# Patient Record
Sex: FEMALE | Race: BLACK OR AFRICAN AMERICAN | NOT HISPANIC OR LATINO | Employment: OTHER | URBAN - METROPOLITAN AREA
[De-identification: names, ages, dates, MRNs, and addresses within clinical notes are randomized per-mention and may not be internally consistent; named-entity substitution may affect disease eponyms.]

---

## 2017-04-10 ENCOUNTER — OFFICE VISIT (OUTPATIENT)
Dept: FAMILY MEDICINE | Facility: CLINIC | Age: 77
End: 2017-04-10
Payer: MEDICARE

## 2017-04-10 VITALS
OXYGEN SATURATION: 99 % | SYSTOLIC BLOOD PRESSURE: 124 MMHG | HEIGHT: 63 IN | DIASTOLIC BLOOD PRESSURE: 78 MMHG | BODY MASS INDEX: 25.6 KG/M2 | WEIGHT: 144.5 LBS | HEART RATE: 62 BPM | TEMPERATURE: 99 F

## 2017-04-10 DIAGNOSIS — E78.5 HYPERLIPIDEMIA LDL GOAL <100: ICD-10-CM

## 2017-04-10 DIAGNOSIS — M85.80 OSTEOPENIA, UNSPECIFIED LOCATION: ICD-10-CM

## 2017-04-10 DIAGNOSIS — I12.9 BENIGN HYPERTENSION WITH CHRONIC KIDNEY DISEASE, STAGE III: ICD-10-CM

## 2017-04-10 DIAGNOSIS — N25.81 SECONDARY HYPERPARATHYROIDISM OF RENAL ORIGIN: ICD-10-CM

## 2017-04-10 DIAGNOSIS — H61.23 IMPACTED CERUMEN, BILATERAL: ICD-10-CM

## 2017-04-10 DIAGNOSIS — E66.3 OVERWEIGHT (BMI 25.0-29.9): ICD-10-CM

## 2017-04-10 DIAGNOSIS — E11.9 CONTROLLED TYPE 2 DIABETES MELLITUS WITHOUT COMPLICATION, WITHOUT LONG-TERM CURRENT USE OF INSULIN: ICD-10-CM

## 2017-04-10 DIAGNOSIS — D56.3 ALPHA THALASSEMIA TRAIT: ICD-10-CM

## 2017-04-10 DIAGNOSIS — E55.9 VITAMIN D DEFICIENCY DISEASE: ICD-10-CM

## 2017-04-10 DIAGNOSIS — Z00.00 ROUTINE MEDICAL EXAM: Primary | ICD-10-CM

## 2017-04-10 DIAGNOSIS — I70.0 ATHEROSCLEROSIS OF ABDOMINAL AORTA: ICD-10-CM

## 2017-04-10 DIAGNOSIS — Z12.31 ENCOUNTER FOR SCREENING MAMMOGRAM FOR BREAST CANCER: ICD-10-CM

## 2017-04-10 DIAGNOSIS — N18.30 BENIGN HYPERTENSION WITH CHRONIC KIDNEY DISEASE, STAGE III: ICD-10-CM

## 2017-04-10 PROCEDURE — 99213 OFFICE O/P EST LOW 20 MIN: CPT | Mod: PBBFAC,PO | Performed by: INTERNAL MEDICINE

## 2017-04-10 PROCEDURE — 99397 PER PM REEVAL EST PAT 65+ YR: CPT | Mod: S$PBB,,, | Performed by: INTERNAL MEDICINE

## 2017-04-10 PROCEDURE — 99999 PR PBB SHADOW E&M-EST. PATIENT-LVL III: CPT | Mod: PBBFAC,,, | Performed by: INTERNAL MEDICINE

## 2017-04-10 RX ORDER — METFORMIN HYDROCHLORIDE 500 MG/1
500 TABLET ORAL 2 TIMES DAILY WITH MEALS
Qty: 180 TABLET | Refills: 1 | Status: SHIPPED | OUTPATIENT
Start: 2017-04-10 | End: 2017-10-06 | Stop reason: SDUPTHER

## 2017-04-10 RX ORDER — MONTELUKAST SODIUM 4 MG/1
2 TABLET, CHEWABLE ORAL 2 TIMES DAILY
Qty: 360 TABLET | Refills: 1 | Status: SHIPPED | OUTPATIENT
Start: 2017-04-10 | End: 2017-10-06 | Stop reason: SDUPTHER

## 2017-04-10 RX ORDER — LISINOPRIL 40 MG/1
40 TABLET ORAL DAILY
Qty: 90 TABLET | Refills: 1 | Status: SHIPPED | OUTPATIENT
Start: 2017-04-10 | End: 2017-10-06 | Stop reason: SDUPTHER

## 2017-04-10 RX ORDER — HYDROCHLOROTHIAZIDE 25 MG/1
25 TABLET ORAL DAILY
Qty: 90 TABLET | Refills: 1 | Status: SHIPPED | OUTPATIENT
Start: 2017-04-10 | End: 2017-10-06 | Stop reason: SDUPTHER

## 2017-04-10 NOTE — MR AVS SNAPSHOT
[facility]  [address] [place] [address]  Phone: [phone]  Fax: [phone]                  Lizbeth CORDOVA Foster   4/10/2017 1:40 PM   Office Visit    Description:  Female : 1940   Provider:  Nusrat Cruz MD   Department:  [facility]           Reason for Visit     Hyperlipidemia     Diabetes     Follow-up           Diagnoses this Visit        Comments    Routine medical exam    -  Primary     Controlled type 2 diabetes mellitus without complication, without long-term current use of insulin         Benign hypertension with chronic kidney disease, stage III         Hyperlipidemia LDL goal <100         Osteopenia, unspecified location         Vitamin D deficiency disease         Alpha thalassemia trait         Atherosclerosis of abdominal aorta         Secondary hyperparathyroidism of renal origin         Overweight (BMI 25.0-29.9)         Encounter for screening mammogram for breast cancer                To Do List           Future Appointments        Provider Department Dept Phone    2017 8:30 AM LAB, [place] [facility] [phone]    2017 3:00 PM [facility] MAMMO1 [facility] [phone]      Goals (5 Years of Data)              10/10/16    3/3/16    9/30/15    HEMOGLOBIN A1C < 7.0   6.1  6.0  6.1    Related Problems    Controlled type 2 diabetes mellitus without complication      Follow-Up and Disposition     Return in about 6 months (around 10/10/2017), or if symptoms worsen or fail to improve, for follow up chronic medical conditions..       These Medications        Disp Refills Start End    colestipol (COLESTID) 1 gram Tab 360 tablet 1 4/10/2017     Take 2 tablets (2 g total) by mouth 2 (two) times daily. - Oral    Pharmacy: [facility] - [place]  [address] Ph #: [phone]       hydrochlorothiazide (HYDRODIURIL) 25 MG tablet 90 tablet 1 4/10/2017     Take 1  tablet (25 mg total) by mouth once daily. - Oral    Pharmacy: [facility] # [facility] [address] [place] [id number] Ph #: [phone]       lisinopril (PRINIVIL,ZESTRIL) 40 MG tablet 90 tablet 1 4/10/2017     Take 1 tablet (40 mg total) by mouth once daily. - Oral    Pharmacy: [facility] # [facility] [address] [place] [id number] [address] Ph #: [phone]       metformin (GLUCOPHAGE) 500 MG tablet 180 tablet 1 4/10/2017     Take 1 tablet (500 mg total) by mouth 2 (two) times daily with meals. - Oral    Pharmacy: [facility] # [facility] [address] [place] [address] Ph #: [phone]         [facility]     [facility] Nurse Care Line - 24/7 Assistance  Unless otherwise directed by your provider, please contact [facility], our nurse care line that is available for 24/7 assistance.     Registered nurses in the [facility] provide: appointment scheduling, clinical advisement, health education, and other advisory services.  Call: [phone] (toll free)               Medications           Message regarding Medications     Verify the changes and/or additions to your medication regime listed below are the same as discussed with your clinician today.  If any of these changes or additions are incorrect, please notify your healthcare provider.             Verify that the below list of medications is an accurate representation of the medications you are currently taking.  If none reported, the list may be blank. If incorrect, please contact your healthcare provider. Carry this list with you in case of emergency.           Current Medications     aspirin (ECOTRIN) 81 MG EC tablet Take 1 tablet (81 mg total) by mouth once daily.    colestipol (COLESTID) 1 gram Tab Take 2 tablets (2 g total) by mouth 2 (two) times daily.    cyanocobalamin (VITAMIN B-12) 1000 MCG tablet Take 100 mcg by mouth once daily.      fluticasone (VERAMYST) 27.5 mcg/actuation nasal spray 2 sprays  "by Nasal route once daily.      garlic 1 mg Cap Take by mouth. 1 Capsule Oral Every day    hydrochlorothiazide (HYDRODIURIL) 25 MG tablet Take 1 tablet (25 mg total) by mouth once daily.    lisinopril (PRINIVIL,ZESTRIL) 40 MG tablet Take 1 tablet (40 mg total) by mouth once daily.    metformin (GLUCOPHAGE) 500 MG tablet Take 1 tablet (500 mg total) by mouth 2 (two) times daily with meals.    omega-3 fatty acids 1,000 mg Cap Take by mouth. 1 Capsule Oral Every day    vitamin D 185 MG Tab Take 185 mg by mouth once daily.             Clinical Reference Information           Your Vitals Were     BP Pulse Temp Height Weight SpO2    124/78 62 98.5 °F (36.9 °C) (Oral) 5' 3" (1.6 m) 65.5 kg (144 lb 8.2 oz) 99%    BMI                25.6 kg/m2          Blood Pressure          Most Recent Value    BP  124/78      Allergies as of 4/10/2017     Cholesterol Analogues    Clindamycin    Statins-hmg-coa Reductase Inhibitors    Welchol [Colesevelam]    Codeine    Penicillins      Immunizations Administered on Date of Encounter - 4/10/2017     None      Orders Placed During Today's Visit     Future Labs/Procedures Expected by Expires    CBC auto differential  4/10/2017 4/10/2018    Comprehensive metabolic panel  4/10/2017 4/10/2018    Hemoglobin A1c  4/10/2017 4/10/2018    Lipid panel  4/10/2017 4/10/2018    Mammo Digital Screening Bilat with CAD  4/10/2017 6/10/2018      Language Assistance Services     ATTENTION: Language assistance services are available, free of charge. Please call [phone].      ATENCIÓN: Si habla español, tiene a tovar disposición servicios gratuitos de asistencia lingüística. Llame al [phone].     [facility] Ý: N?u b?n nói Ti?ng Vi?t, có các d?ch v? h? tr? ngôn ng? mi?n phí dành cho b?n. G?i s? [phone].         [facility] complies with applicable Federal civil rights laws and does not discriminate on the basis of race, color, national origin, age, disability, or sex.        "

## 2017-04-10 NOTE — PROGRESS NOTES
HISTORY OF PRESENT ILLNESS:  Lizbeth Hutchison is a 77 y.o. female who presents to the clinic today for a routine medical physical exam. Her last physical exam was approximately 1 years(s) ago.    Contraception: no method      PAST MEDICAL HISTORY:  Past Medical History:   Diagnosis Date    Alpha thalassemia trait     Anxiety     Atherosclerosis of abdominal aorta     noted on CT scan 2016    DDD (degenerative disc disease), lumbar     chronic low back pain    Diverticulosis     History of colonic polyps     last colonoscopy  - normal    Hyperlipidemia LDL goal <100     unable to tolerate statins or Welchol    Hypertension     Osteopenia     no need for medication at this time - last BMD 2010    Overweight     Type II or unspecified type diabetes mellitus without mention of complication, not stated as uncontrolled     Vitamin D deficiency disease     resolved with supplementation       PAST SURGICAL HISTORY:  Past Surgical History:   Procedure Laterality Date    1994    bilateral    HEMORRHOID SURGERY      KELOID EXCISION  ,     abdominal wall    TOTAL ABDOMINAL HYSTERECTOMY      TRIGGER FINGER RELEASE      left hand       SOCIAL HISTORY:  Social History     Social History    Marital status:      Spouse name: N/A    Number of children: 2    Years of education: N/A     Occupational History          Social History Main Topics    Smoking status: Never Smoker    Smokeless tobacco: Never Used    Alcohol use No    Drug use: Not on file    Sexual activity: Not on file     Other Topics Concern    Not on file     Social History Narrative       FAMILY HISTORY:  Family History   Problem Relation Age of Onset    Adopted: Yes    Heart failure Mother     Alzheimer's disease Mother     Breast cancer Maternal Grandmother     Other Sister       from too much anesthesia    Congenital heart disease Brother     Arthritis  Sister      back problems    Hypertension Sister     Transient ischemic attack Sister     Stroke Brother     Coronary artery disease Brother      s/p stenting    Stroke Brother     Colon cancer Other     Diabetes Neg Hx        ALLERGIES AND MEDICATIONS: updated and reviewed.  Review of patient's allergies indicates:   Allergen Reactions    Cholesterol analogues Other (See Comments)     Muscle spasam    Clindamycin Hives    Statins-hmg-coa reductase inhibitors Other (See Comments)    Welchol [colesevelam] Other (See Comments)    Codeine Rash    Penicillins Rash     Medication List with Changes/Refills   Current Medications    ASPIRIN (ECOTRIN) 81 MG EC TABLET    Take 1 tablet (81 mg total) by mouth once daily.    CYANOCOBALAMIN (VITAMIN B-12) 1000 MCG TABLET    Take 100 mcg by mouth once daily.      FLUTICASONE (VERAMYST) 27.5 MCG/ACTUATION NASAL SPRAY    2 sprays by Nasal route once daily.      GARLIC 1 MG CAP    Take by mouth. 1 Capsule Oral Every day    OMEGA-3 FATTY ACIDS 1,000 MG CAP    Take by mouth. 1 Capsule Oral Every day    VITAMIN D 185 MG TAB    Take 185 mg by mouth once daily.     Changed and/or Refilled Medications    Modified Medication Previous Medication    COLESTIPOL (COLESTID) 1 GRAM TAB colestipol (COLESTID) 1 gram Tab       Take 2 tablets (2 g total) by mouth 2 (two) times daily.    Take 2 tablets (2 g total) by mouth 2 (two) times daily.    HYDROCHLOROTHIAZIDE (HYDRODIURIL) 25 MG TABLET hydrochlorothiazide (HYDRODIURIL) 25 MG tablet       Take 1 tablet (25 mg total) by mouth once daily.    Take 1 tablet (25 mg total) by mouth once daily.    LISINOPRIL (PRINIVIL,ZESTRIL) 40 MG TABLET lisinopril (PRINIVIL,ZESTRIL) 40 MG tablet       Take 1 tablet (40 mg total) by mouth once daily.    Take 1 tablet (40 mg total) by mouth once daily.    METFORMIN (GLUCOPHAGE) 500 MG TABLET metformin (GLUCOPHAGE) 500 MG tablet       Take 1 tablet (500 mg total) by mouth 2 (two) times daily with meals.     "Take 1 tablet (500 mg total) by mouth 2 (two) times daily with meals.             SCREENING HISTORY:  Health Maintenance       Date Due Completion Date    TETANUS VACCINE 4/3/1958 ---    Lipid Panel 3/3/2017 3/3/2016    Mammogram 3/24/2017 3/24/2016    Hemoglobin A1c 4/10/2017 10/10/2016    Foot Exam 10/10/2017 10/10/2016    Eye Exam 10/31/2017 10/31/2016    Override on 3/18/2015: Done    Override on 3/4/2014: Done    Override on 1/28/2013: Done    DEXA SCAN 3/24/2018 3/24/2016    Override on 10/11/2011: Done (osteopenia)            REVIEW OF SYSTEMS:   The patient reports fair dietary habits.  The patient does not exercise regularly.  General ROS: negative for - chills, fever or malaise; she has gained a little weight  Psychological ROS: negative for - anxiety, depression, sleep disturbances or suicidal ideation  Ophthalmic ROS: negative for - blurry vision or eye pain  ENT ROS: negative for - epistaxis, headaches, nasal congestion, oral lesions or sore throat  Allergy and Immunology ROS: negative for - hives  Hematological and Lymphatic ROS: negative for - swollen lymph nodes  Endocrine ROS: negative for - polydipsia/polyuria or temperature intolerance  Respiratory ROS: no cough, shortness of breath, or wheezing  Cardiovascular ROS: no chest pain or dyspnea on exertion  Gastrointestinal ROS: no abdominal pain, change in bowel habits, or black or bloody stools  Genito-Urinary ROS: no dysuria, trouble voiding, or hematuria  Breast ROS: negative for breast lumps  Musculoskeletal ROS: negative for - gait disturbance, joint swelling, muscle pain or muscular weakness  Neurological ROS: no TIA or stroke symptoms  Dermatological ROS: negative for mole changes and rash    Physical Examination:   Vitals:    04/10/17 1351   BP: 124/78   Pulse: 62   Temp: 98.5 °F (36.9 °C)     Weight: 65.5 kg (144 lb 8.2 oz)   Height: 5' 3" (160 cm)   Body mass index is 25.6 kg/(m^2).    General appearance - alert, well appearing, and in no " distress and overweight  Mental status - alert, oriented to person, place, and time, normal mood, behavior, speech, dress, motor activity, and thought processes  Eyes - pupils equal and reactive, extraocular eye movements intact, sclera anicteric  Ears - bilateral cerumen impaction noted  Mouth - mucous membranes moist, pharynx normal without lesions  Neck - supple, no significant adenopathy, carotids upstroke normal bilaterally, no bruits, thyroid exam: thyroid is normal in size without nodules or tenderness  Lymphatics - no palpable lymphadenopathy  Chest - clear to auscultation, no wheezes, rales or rhonchi, symmetric air entry  Heart - normal rate and regular rhythm, no gallops noted  Abdomen - soft, nontender, nondistended, no masses or organomegaly  Breasts - breasts appear normal, no suspicious masses, no skin or nipple changes or axillary nodes  Back exam - full range of motion, no tenderness, palpable spasm or pain on motion  Neurological - alert, oriented, normal speech, no focal findings or movement disorder noted, cranial nerves II through XII intact  Musculoskeletal - no joint tenderness, deformity or swelling  Extremities - peripheral pulses normal, no pedal edema, no clubbing or cyanosis  Skin - normal coloration and turgor, no rashes, no suspicious skin lesions noted      ASSESSMENT AND PLAN:  1. Routine medical exam  Counseled on age appropriate medical preventative services including age appropriate cancer screenings, age appropriate eye and dental exams, over all nutritional health, need for a consistent exercise regimen, and an over all push towards maintaining a vigorous and active lifestyle.  Counseled on age appropriate vaccines and discussed upcoming health care needs based on age/gender. Discussed good sleep hygiene and stress management.     2. Controlled type 2 diabetes mellitus without complication, without long-term current use of insulin  Diabetes currently is controlled. We discussed  diabetic diet and regular exercise. We discussed home blood sugar monitoring, if appropriate. The current medical regimen is effective;  continue present plan and medications.    - Hemoglobin A1c; Future  - metformin (GLUCOPHAGE) 500 MG tablet; Take 1 tablet (500 mg total) by mouth 2 (two) times daily with meals.  Dispense: 180 tablet; Refill: 1    3. Benign hypertension with chronic kidney disease, stage III  Discussed sodium restriction, maintaining ideal body weight and regular exercise program as physiologic means to achieve blood pressure control. The patient will strive towards this. The current medical regimen is effective;  continue present plan and medications. Recommended patient to check home readings to monitor and see me for followup as scheduled or sooner as needed. Patient was educated that both decongestant and anti-inflammatory medication may raise blood pressure. Stable kidney function. Observe. Patient counseled to avoid/minimize the use of anti-inflammatory  medication.   - hydrochlorothiazide (HYDRODIURIL) 25 MG tablet; Take 1 tablet (25 mg total) by mouth once daily.  Dispense: 90 tablet; Refill: 1  - lisinopril (PRINIVIL,ZESTRIL) 40 MG tablet; Take 1 tablet (40 mg total) by mouth once daily.  Dispense: 90 tablet; Refill: 1  - CBC auto differential; Future    4. Hyperlipidemia LDL goal <100  We discussed low fat diet and regular exercise.The current medical regimen is effective;  continue present plan and medications.    - Lipid panel; Future  - colestipol (COLESTID) 1 gram Tab; Take 2 tablets (2 g total) by mouth 2 (two) times daily.  Dispense: 360 tablet; Refill: 1  - Comprehensive metabolic panel; Future    5. Osteopenia, unspecified location/6. Vitamin D deficiency disease  We discussed adequate calcium and vitamin D supplementation. She is up to date on her BMD.     7. Alpha thalassemia trait  Stable. Asymptomatic. Observe.     8. Atherosclerosis of abdominal aorta  Patient with  Atherosclerosis of the Aorta.  Stable/asymptomatic. Currently stable on lipid lowering medication and b/p monitoring.     9. Secondary hyperparathyroidism of renal origin  Stable. Asymptomatic. Observe.     10. Overweight (BMI 25.0-29.9)  The patient is asked to make an attempt to improve diet and exercise patterns to aid in medical management of this problem.     11. Encounter for screening mammogram for breast cancer    - Mammo Digital Screening Bilat with CAD; Future  - Mammo Digital Screening Bilat with CAD    12. Impacted cerumen, bilateral  Successfully cleaned in the office.          Return in about 6 months (around 10/10/2017), or if symptoms worsen or fail to improve, for follow up chronic medical conditions.. or sooner as needed.

## 2017-04-11 ENCOUNTER — LAB VISIT (OUTPATIENT)
Dept: LAB | Facility: HOSPITAL | Age: 77
End: 2017-04-11
Attending: INTERNAL MEDICINE
Payer: MEDICARE

## 2017-04-11 DIAGNOSIS — I12.9 BENIGN HYPERTENSION WITH CHRONIC KIDNEY DISEASE, STAGE III: ICD-10-CM

## 2017-04-11 DIAGNOSIS — E78.5 HYPERLIPIDEMIA LDL GOAL <100: ICD-10-CM

## 2017-04-11 DIAGNOSIS — E11.9 CONTROLLED TYPE 2 DIABETES MELLITUS WITHOUT COMPLICATION, WITHOUT LONG-TERM CURRENT USE OF INSULIN: ICD-10-CM

## 2017-04-11 DIAGNOSIS — N18.30 BENIGN HYPERTENSION WITH CHRONIC KIDNEY DISEASE, STAGE III: ICD-10-CM

## 2017-04-11 LAB
ALBUMIN SERPL BCP-MCNC: 3.9 G/DL
ALP SERPL-CCNC: 79 U/L
ALT SERPL W/O P-5'-P-CCNC: 10 U/L
ANION GAP SERPL CALC-SCNC: 11 MMOL/L
AST SERPL-CCNC: 13 U/L
BASOPHILS # BLD AUTO: 0.03 K/UL
BASOPHILS NFR BLD: 0.5 %
BILIRUB SERPL-MCNC: 0.5 MG/DL
BUN SERPL-MCNC: 20 MG/DL
CALCIUM SERPL-MCNC: 9.5 MG/DL
CHLORIDE SERPL-SCNC: 105 MMOL/L
CHOLEST/HDLC SERPL: 5.2 {RATIO}
CO2 SERPL-SCNC: 25 MMOL/L
CREAT SERPL-MCNC: 1.3 MG/DL
DIFFERENTIAL METHOD: ABNORMAL
EOSINOPHIL # BLD AUTO: 0.1 K/UL
EOSINOPHIL NFR BLD: 2.3 %
ERYTHROCYTE [DISTWIDTH] IN BLOOD BY AUTOMATED COUNT: 15.4 %
EST. GFR  (AFRICAN AMERICAN): 45.7 ML/MIN/1.73 M^2
EST. GFR  (NON AFRICAN AMERICAN): 39.7 ML/MIN/1.73 M^2
GLUCOSE SERPL-MCNC: 89 MG/DL
HCT VFR BLD AUTO: 41.2 %
HDL/CHOLESTEROL RATIO: 19.4 %
HDLC SERPL-MCNC: 232 MG/DL
HDLC SERPL-MCNC: 45 MG/DL
HGB BLD-MCNC: 12.6 G/DL
LDLC SERPL CALC-MCNC: 153 MG/DL
LYMPHOCYTES # BLD AUTO: 1.9 K/UL
LYMPHOCYTES NFR BLD: 31.9 %
MCH RBC QN AUTO: 22.6 PG
MCHC RBC AUTO-ENTMCNC: 30.6 %
MCV RBC AUTO: 74 FL
MONOCYTES # BLD AUTO: 0.4 K/UL
MONOCYTES NFR BLD: 6.9 %
NEUTROPHILS # BLD AUTO: 3.5 K/UL
NEUTROPHILS NFR BLD: 58.4 %
NONHDLC SERPL-MCNC: 187 MG/DL
PLATELET # BLD AUTO: 227 K/UL
PMV BLD AUTO: 11.9 FL
POTASSIUM SERPL-SCNC: 4.1 MMOL/L
PROT SERPL-MCNC: 7.6 G/DL
RBC # BLD AUTO: 5.57 M/UL
SODIUM SERPL-SCNC: 141 MMOL/L
TRIGL SERPL-MCNC: 170 MG/DL
WBC # BLD AUTO: 6.05 K/UL

## 2017-04-11 PROCEDURE — 83036 HEMOGLOBIN GLYCOSYLATED A1C: CPT

## 2017-04-11 PROCEDURE — 85025 COMPLETE CBC W/AUTO DIFF WBC: CPT

## 2017-04-11 PROCEDURE — 36415 COLL VENOUS BLD VENIPUNCTURE: CPT | Mod: PO

## 2017-04-11 PROCEDURE — 80053 COMPREHEN METABOLIC PANEL: CPT

## 2017-04-11 PROCEDURE — 80061 LIPID PANEL: CPT

## 2017-04-12 LAB
ESTIMATED AVG GLUCOSE: 137 MG/DL
HBA1C MFR BLD HPLC: 6.4 %

## 2017-04-13 ENCOUNTER — HOSPITAL ENCOUNTER (OUTPATIENT)
Dept: RADIOLOGY | Facility: HOSPITAL | Age: 77
Discharge: HOME | End: 2017-04-13
Attending: INTERNAL MEDICINE
Payer: MEDICARE

## 2017-04-13 DIAGNOSIS — Z12.31 ENCOUNTER FOR SCREENING MAMMOGRAM FOR BREAST CANCER: ICD-10-CM

## 2017-04-13 PROCEDURE — 77063 BREAST TOMOSYNTHESIS BI: CPT | Mod: 26,,, | Performed by: RADIOLOGY

## 2017-04-13 PROCEDURE — 77067 SCR MAMMO BI INCL CAD: CPT | Mod: 26,,, | Performed by: RADIOLOGY

## 2017-04-13 PROCEDURE — 77067 SCR MAMMO BI INCL CAD: CPT | Mod: TC

## 2017-10-06 ENCOUNTER — OFFICE VISIT (OUTPATIENT)
Dept: FAMILY MEDICINE | Facility: CLINIC | Age: 77
End: 2017-10-06
Payer: MEDICARE

## 2017-10-06 VITALS
TEMPERATURE: 98 F | DIASTOLIC BLOOD PRESSURE: 56 MMHG | OXYGEN SATURATION: 97 % | SYSTOLIC BLOOD PRESSURE: 114 MMHG | HEART RATE: 56 BPM | BODY MASS INDEX: 24.61 KG/M2 | HEIGHT: 63 IN | WEIGHT: 138.88 LBS

## 2017-10-06 DIAGNOSIS — N18.30 BENIGN HYPERTENSION WITH CHRONIC KIDNEY DISEASE, STAGE III: ICD-10-CM

## 2017-10-06 DIAGNOSIS — I70.0 ATHEROSCLEROSIS OF ABDOMINAL AORTA: ICD-10-CM

## 2017-10-06 DIAGNOSIS — I12.9 BENIGN HYPERTENSION WITH CHRONIC KIDNEY DISEASE, STAGE III: ICD-10-CM

## 2017-10-06 DIAGNOSIS — N18.30 CHRONIC KIDNEY DISEASE, STAGE 3: ICD-10-CM

## 2017-10-06 DIAGNOSIS — E78.5 HYPERLIPIDEMIA LDL GOAL <100: ICD-10-CM

## 2017-10-06 DIAGNOSIS — E66.3 OVERWEIGHT (BMI 25.0-29.9): ICD-10-CM

## 2017-10-06 DIAGNOSIS — D56.3 ALPHA THALASSEMIA TRAIT: Primary | ICD-10-CM

## 2017-10-06 DIAGNOSIS — E55.9 VITAMIN D DEFICIENCY DISEASE: ICD-10-CM

## 2017-10-06 DIAGNOSIS — E11.9 CONTROLLED TYPE 2 DIABETES MELLITUS WITHOUT COMPLICATION, WITHOUT LONG-TERM CURRENT USE OF INSULIN: ICD-10-CM

## 2017-10-06 DIAGNOSIS — M85.80 OSTEOPENIA, UNSPECIFIED LOCATION: ICD-10-CM

## 2017-10-06 DIAGNOSIS — N25.81 SECONDARY HYPERPARATHYROIDISM OF RENAL ORIGIN: ICD-10-CM

## 2017-10-06 DIAGNOSIS — Z23 NEED FOR IMMUNIZATION AGAINST INFLUENZA: ICD-10-CM

## 2017-10-06 PROCEDURE — 99214 OFFICE O/P EST MOD 30 MIN: CPT | Mod: S$PBB,,, | Performed by: NURSE PRACTITIONER

## 2017-10-06 PROCEDURE — 99214 OFFICE O/P EST MOD 30 MIN: CPT | Mod: PBBFAC,PO | Performed by: NURSE PRACTITIONER

## 2017-10-06 PROCEDURE — 99999 PR PBB SHADOW E&M-EST. PATIENT-LVL IV: CPT | Mod: PBBFAC,,, | Performed by: NURSE PRACTITIONER

## 2017-10-06 PROCEDURE — G0008 ADMIN INFLUENZA VIRUS VAC: HCPCS | Mod: PBBFAC,PO

## 2017-10-06 RX ORDER — METFORMIN HYDROCHLORIDE 500 MG/1
500 TABLET ORAL 2 TIMES DAILY WITH MEALS
Qty: 180 TABLET | Refills: 1 | Status: SHIPPED | OUTPATIENT
Start: 2017-10-06 | End: 2017-10-11 | Stop reason: ALTCHOICE

## 2017-10-06 RX ORDER — HYDROCHLOROTHIAZIDE 25 MG/1
25 TABLET ORAL DAILY
Qty: 90 TABLET | Refills: 1 | Status: SHIPPED | OUTPATIENT
Start: 2017-10-06 | End: 2018-06-26 | Stop reason: SDUPTHER

## 2017-10-06 RX ORDER — LISINOPRIL 40 MG/1
40 TABLET ORAL DAILY
Qty: 90 TABLET | Refills: 1 | Status: SHIPPED | OUTPATIENT
Start: 2017-10-06 | End: 2018-06-26 | Stop reason: SDUPTHER

## 2017-10-06 RX ORDER — MONTELUKAST SODIUM 4 MG/1
2 TABLET, CHEWABLE ORAL 2 TIMES DAILY
Qty: 360 TABLET | Refills: 1 | Status: SHIPPED | OUTPATIENT
Start: 2017-10-06 | End: 2019-03-22 | Stop reason: SDUPTHER

## 2017-10-06 NOTE — PROGRESS NOTES
Subjective:       Patient ID: Lizbeth Hutchison is a 77 y.o. female.    Chief Complaint: Hypertension (F/U); Hyperlipidemia (F/U); and Diabetes (F/U)    77-year-old female presents to the clinic today for follow-up on hypertension, hyperlipidemia, and diabetes.  She does not check her blood sugars at home.  Her home blood pressures run in the 120s over 70-80.  She has good dietary habits.  She dances twice a week.  She is compliant with all of her medications.  She denies any cardiac chest pain, heart palpitations, shortness of breath, or swelling to lower extremities.  She denies any headaches, dizziness, or blurred vision.  She states that she had her eye exam in March or April of this year per .  I will attempt to get a copy of that results.  She would like a need to examine her breasts.  Her mammogram is up-to-date.       Hypertension   Pertinent negatives include no chest pain, headaches, neck pain or palpitations.   Hyperlipidemia   Pertinent negatives include no chest pain.   Diabetes   Pertinent negatives for hypoglycemia include no confusion or headaches. Pertinent negatives for diabetes include no chest pain, no polydipsia, no polyuria and no weakness.     Past Medical History:   Diagnosis Date    Alpha thalassemia trait     Anxiety     Atherosclerosis of abdominal aorta     noted on CT scan 4/6/2016    DDD (degenerative disc disease), lumbar     chronic low back pain    Diverticulosis     History of colonic polyps     last colonoscopy 2011 - normal    Hyperlipidemia LDL goal <100     unable to tolerate statins or Welchol    Hypertension     Osteopenia     no need for medication at this time - last BMD October 2010    Overweight     Type II or unspecified type diabetes mellitus without mention of complication, not stated as uncontrolled     Vitamin D deficiency disease     resolved with supplementation     Past Surgical History:   Procedure Laterality Date    samuel  1994     bilateral    HEMORRHOID SURGERY  1970    KELOID EXCISION  1974, 1976    abdominal wall    TOTAL ABDOMINAL HYSTERECTOMY  1972    TRIGGER FINGER RELEASE  2003    left hand      reports that she has never smoked. She has never used smokeless tobacco. She reports that she does not drink alcohol.  Review of Systems   Constitutional: Negative for activity change and unexpected weight change.   HENT: Negative for hearing loss, rhinorrhea and trouble swallowing.    Eyes: Negative for discharge and visual disturbance.   Respiratory: Negative for cough, chest tightness and wheezing.    Cardiovascular: Negative for chest pain and palpitations.   Gastrointestinal: Negative for blood in stool, constipation, diarrhea and vomiting.   Endocrine: Negative for polydipsia and polyuria.   Genitourinary: Negative for difficulty urinating, dysuria, hematuria and menstrual problem.   Musculoskeletal: Negative for arthralgias, joint swelling and neck pain.   Neurological: Negative for weakness and headaches.   Psychiatric/Behavioral: Negative for confusion and dysphoric mood.       Objective:      Physical Exam   Constitutional: She is oriented to person, place, and time. She appears well-developed and well-nourished. No distress.   Eyes: Conjunctivae and EOM are normal. Pupils are equal, round, and reactive to light. Right eye exhibits no discharge. Left eye exhibits no discharge. No scleral icterus.   Neck: Normal range of motion. Neck supple. No JVD present.   Cardiovascular: Normal rate, regular rhythm and normal heart sounds.  Exam reveals no gallop and no friction rub.    No murmur heard.  Pulmonary/Chest: Effort normal. No respiratory distress. She has no wheezes. She has no rales.   Abdominal: Soft. Bowel sounds are normal. There is no tenderness.   Genitourinary:   Genitourinary Comments: No palpable masses, tenderness or nipple discharge noted to either breast no axilla adenopathy    Musculoskeletal: Normal range of motion.  She exhibits no edema or deformity.   Protective Sensation (w/ 10 gram monofilament):  Right: Intact  Left: Intact    Visual Inspection:  Normal -  Bilateral    Pedal Pulses:   Right: Present  Left: Present    Posterior tibialis:   Right:Present  Left: Present     Neurological: She is alert and oriented to person, place, and time.   Skin: Skin is warm and dry. She is not diaphoretic.   Psychiatric: She has a normal mood and affect.       Assessment:       1. Alpha thalassemia trait    2. Controlled type 2 diabetes mellitus without complication, without long-term current use of insulin    3. Benign hypertension with chronic kidney disease, stage III    4. Hyperlipidemia LDL goal <100    5. Atherosclerosis of abdominal aorta    6. Chronic kidney disease, stage 3    7. Osteopenia, unspecified location    8. Overweight (BMI 25.0-29.9)    9. Secondary hyperparathyroidism of renal origin    10. Vitamin D deficiency disease    11. Need for immunization against influenza        Plan:         Alpha thalassemia trait  - stable observe asymptomatic     Controlled type 2 diabetes mellitus without complication, without long-term current use of insulin  -     metformin (GLUCOPHAGE) 500 MG tablet; Take 1 tablet (500 mg total) by mouth 2 (two) times daily with meals.  Dispense: 180 tablet; Refill: 1  -     Comprehensive metabolic panel; Future; Expected date: 10/06/2017  -     Hemoglobin A1c; Future; Expected date: 10/06/2017  - await lab results pending any medication adjustments    Benign hypertension with chronic kidney disease, stage III  -     lisinopril (PRINIVIL,ZESTRIL) 40 MG tablet; Take 1 tablet (40 mg total) by mouth once daily.  Dispense: 90 tablet; Refill: 1  -     hydrochlorothiazide (HYDRODIURIL) 25 MG tablet; Take 1 tablet (25 mg total) by mouth once daily.  Dispense: 90 tablet; Refill: 1  -     CBC auto differential; Future; Expected date: 10/06/2017  - The current medical regimen is effective;  continue present  plan and medications.    Hyperlipidemia LDL goal <100  -     colestipol (COLESTID) 1 gram Tab; Take 2 tablets (2 g total) by mouth 2 (two) times daily.  Dispense: 360 tablet; Refill: 1  -     Comprehensive metabolic panel; Future; Expected date: 10/06/2017  -     Lipid panel; Future; Expected date: 10/06/2017  - The current medical regimen is effective;  continue present plan and medications.    Atherosclerosis of abdominal aorta  - Stable / Asymptomatic is on blood pressure and cholesterol lowering medications    Chronic kidney disease, stage 3  - avoid all anti-inflammatories     Osteopenia, unspecified location  - start Calcium 600 mg po daily   - continue Vitamin D    Overweight (BMI 25.0-29.9)  - The patient is asked to make an attempt to improve diet and exercise patterns to aid in medical management of this problem.    Secondary hyperparathyroidism of renal origin  - stable observe asymptomatic     Vitamin D deficiency disease  - The current medical regimen is effective;  continue present plan and medications.    Need for immunization against influenza  -     Influenza - High Dose (65+) (PF) (IM)

## 2017-10-07 ENCOUNTER — LAB VISIT (OUTPATIENT)
Dept: LAB | Facility: HOSPITAL | Age: 77
End: 2017-10-07
Payer: MEDICARE

## 2017-10-07 DIAGNOSIS — I12.9 BENIGN HYPERTENSION WITH CHRONIC KIDNEY DISEASE, STAGE III: ICD-10-CM

## 2017-10-07 DIAGNOSIS — N18.30 BENIGN HYPERTENSION WITH CHRONIC KIDNEY DISEASE, STAGE III: ICD-10-CM

## 2017-10-07 DIAGNOSIS — E11.9 CONTROLLED TYPE 2 DIABETES MELLITUS WITHOUT COMPLICATION, WITHOUT LONG-TERM CURRENT USE OF INSULIN: ICD-10-CM

## 2017-10-07 DIAGNOSIS — E78.5 HYPERLIPIDEMIA LDL GOAL <100: ICD-10-CM

## 2017-10-07 LAB
ALBUMIN SERPL BCP-MCNC: 3.9 G/DL
ALP SERPL-CCNC: 87 U/L
ALT SERPL W/O P-5'-P-CCNC: 9 U/L
ANION GAP SERPL CALC-SCNC: 8 MMOL/L
AST SERPL-CCNC: 11 U/L
BASOPHILS # BLD AUTO: 0.03 K/UL
BASOPHILS NFR BLD: 0.5 %
BILIRUB SERPL-MCNC: 0.6 MG/DL
BUN SERPL-MCNC: 20 MG/DL
CALCIUM SERPL-MCNC: 9.7 MG/DL
CHLORIDE SERPL-SCNC: 104 MMOL/L
CHOLEST SERPL-MCNC: 237 MG/DL
CHOLEST/HDLC SERPL: 5.2 {RATIO}
CO2 SERPL-SCNC: 30 MMOL/L
CREAT SERPL-MCNC: 1.6 MG/DL
DIFFERENTIAL METHOD: ABNORMAL
EOSINOPHIL # BLD AUTO: 0.1 K/UL
EOSINOPHIL NFR BLD: 2.3 %
ERYTHROCYTE [DISTWIDTH] IN BLOOD BY AUTOMATED COUNT: 15.5 %
EST. GFR  (AFRICAN AMERICAN): 35.6 ML/MIN/1.73 M^2
EST. GFR  (NON AFRICAN AMERICAN): 30.9 ML/MIN/1.73 M^2
ESTIMATED AVG GLUCOSE: 123 MG/DL
GLUCOSE SERPL-MCNC: 95 MG/DL
HBA1C MFR BLD HPLC: 5.9 %
HCT VFR BLD AUTO: 42.7 %
HDLC SERPL-MCNC: 46 MG/DL
HDLC SERPL: 19.4 %
HGB BLD-MCNC: 12.7 G/DL
LDLC SERPL CALC-MCNC: 151.8 MG/DL
LYMPHOCYTES # BLD AUTO: 1.6 K/UL
LYMPHOCYTES NFR BLD: 26.5 %
MCH RBC QN AUTO: 22.4 PG
MCHC RBC AUTO-ENTMCNC: 29.7 G/DL
MCV RBC AUTO: 75 FL
MONOCYTES # BLD AUTO: 0.4 K/UL
MONOCYTES NFR BLD: 6.5 %
NEUTROPHILS # BLD AUTO: 3.8 K/UL
NEUTROPHILS NFR BLD: 64.2 %
NONHDLC SERPL-MCNC: 191 MG/DL
PLATELET # BLD AUTO: 239 K/UL
PMV BLD AUTO: ABNORMAL FL
POTASSIUM SERPL-SCNC: 4.2 MMOL/L
PROT SERPL-MCNC: 7.6 G/DL
RBC # BLD AUTO: 5.68 M/UL
SODIUM SERPL-SCNC: 142 MMOL/L
TRIGL SERPL-MCNC: 196 MG/DL
WBC # BLD AUTO: 5.99 K/UL

## 2017-10-07 PROCEDURE — 80053 COMPREHEN METABOLIC PANEL: CPT

## 2017-10-07 PROCEDURE — 36415 COLL VENOUS BLD VENIPUNCTURE: CPT | Mod: PO

## 2017-10-07 PROCEDURE — 85025 COMPLETE CBC W/AUTO DIFF WBC: CPT

## 2017-10-07 PROCEDURE — 83036 HEMOGLOBIN GLYCOSYLATED A1C: CPT

## 2017-10-07 PROCEDURE — 80061 LIPID PANEL: CPT

## 2017-10-10 ENCOUNTER — TELEPHONE (OUTPATIENT)
Dept: FAMILY MEDICINE | Facility: CLINIC | Age: 77
End: 2017-10-10

## 2017-10-10 NOTE — TELEPHONE ENCOUNTER
I spoke with the patient and explained that her kidney function has slightly worsened and she needed to stay well hydrated and avoid all anti-inflammatories. She does not take any of these. I explained that her triglycerides were slightly elevated so she needed to watch the fats closely in her diet. The rest of her labs were acceptable. I told that we needed to recheck her kidney function in 3 months. She will call back and schedule a appointment. Patient verbalized understanding of above.

## 2017-10-11 ENCOUNTER — TELEPHONE (OUTPATIENT)
Dept: FAMILY MEDICINE | Facility: CLINIC | Age: 77
End: 2017-10-11

## 2017-10-11 NOTE — TELEPHONE ENCOUNTER
I spoke with the patient and told her that I wanted her to stop her Metformin due to her CKD worsening and will recheck labs in 3 months. Patient will call and schedule a appointment. Patient verbalized understanding of above.

## 2017-10-14 ENCOUNTER — OFFICE VISIT (OUTPATIENT)
Dept: URGENT CARE | Facility: CLINIC | Age: 77
End: 2017-10-14
Payer: MEDICARE

## 2017-10-14 VITALS
TEMPERATURE: 98 F | HEART RATE: 59 BPM | SYSTOLIC BLOOD PRESSURE: 126 MMHG | WEIGHT: 138.88 LBS | DIASTOLIC BLOOD PRESSURE: 64 MMHG | OXYGEN SATURATION: 100 % | BODY MASS INDEX: 24.61 KG/M2 | HEIGHT: 63 IN

## 2017-10-14 DIAGNOSIS — M10.9 ACUTE GOUT OF RIGHT FOOT, UNSPECIFIED CAUSE: Primary | ICD-10-CM

## 2017-10-14 PROCEDURE — 99214 OFFICE O/P EST MOD 30 MIN: CPT | Mod: 25,S$GLB,, | Performed by: EMERGENCY MEDICINE

## 2017-10-14 PROCEDURE — 96372 THER/PROPH/DIAG INJ SC/IM: CPT | Mod: S$GLB,,, | Performed by: EMERGENCY MEDICINE

## 2017-10-14 RX ORDER — DEXAMETHASONE SODIUM PHOSPHATE 100 MG/10ML
4 INJECTION INTRAMUSCULAR; INTRAVENOUS
Status: COMPLETED | OUTPATIENT
Start: 2017-10-14 | End: 2017-10-14

## 2017-10-14 RX ORDER — TRAMADOL HYDROCHLORIDE 50 MG/1
50 TABLET ORAL
Qty: 3 TABLET | Refills: 0 | Status: SHIPPED | OUTPATIENT
Start: 2017-10-14 | End: 2017-10-17

## 2017-10-14 RX ADMIN — DEXAMETHASONE SODIUM PHOSPHATE 4 MG: 100 INJECTION INTRAMUSCULAR; INTRAVENOUS at 05:10

## 2017-10-14 NOTE — PATIENT INSTRUCTIONS
Gout    Gout is an inflammation of a joint due to a build-up of gout crystals in the joint fluid. This occurs when there is an excess of uric acid (a normal waste product) in the body. Uric acid builds up in the body when the kidneys are unable to filter enough of it from the blood. This may occur with age. It is also associated with kidney disease. Gout occurs more often in people with obesity, diabetes, high blood pressure, or high levels of fats in the blood. It may run in families. Gout tends to come and go. A flare up of gout is called an attack. Drinking alcohol or eating certain foods (such as shellfish or foods with additives such as high-fructose corn syrup) may increase uric acid levels in the blood and cause a gout attack.  During a gout attack, the affected joint may become a hot, red, swollen and painful. If you have had one attack of gout, you are likely to have another. An attack of gout can be treated with medicine. If these attacks become frequent, a daily medicine may be prescribed to help the kidneys remove uric acid from the body.  Home care  During a gout attack:  · Rest painful joints. If gout affects the joints of your foot or leg, you may want to use crutches for the first few days to keep from bearing weight on the affected joint.  · When sitting or lying down, raise the painful joint to a level higher than your heart.  · Apply an ice pack (ice cubes in a plastic bag wrapped in a thin towel) over the injured area for 20 minutes every 1 to 2 hours the first day for pain relief. Continue this 3 to 4 times a day for swelling and pain.  · Avoid alcohol and foods listed below (see Preventing attacks) during a gout attack. Drink extra fluid to help flush the uric acid through your kidneys.  · If you were prescribed a medicine to treat gout, take it as your healthcare provider has instructed. Don't skip doses.  · Take anti-inflammatory medicine as directed.   · If pain medicines have been  prescribed, take them exactly as directed.    Preventing attacks  · Minimize or avoid alcohol use. Excess alcohol intake can cause a gout attack.  · Limit these foods and beverages:  ¨ Organ meats, such as kidneys and liver  ¨ Certain seafoods (anchovies, sardines, shrimp, scallops, herring, mackerel)  ¨ Wild game, meat extracts and meat gravies  ¨ Foods and beverages sweetened with high-fructose corn syrup, such as sodas  · Eat a healthy diet including low-fat and nonfat dairy, whole grains, and vegetables.  · If you are overweight, talk to your healthcare provider about a weight reduction plan. Avoid fasting or extreme low calorie diets (less than 900 calories per day). This will increase uric acid levels in the body.  · If you have diabetes or high blood pressure, work with your doctor to manage these conditions.  · Protect the joint from injury. Trauma can trigger a gout attack.  Follow-up care  Follow up with your healthcare provider, or as advised.   When to seek medical advice  Call your healthcare provider if you have any of the following:  · Fever over 100.4°F (38.ºC) with worsening joint pain  · Increasing redness around the joint  · Pain developing in another joint  · Repeated vomiting, abdominal pain, or blood in the vomit or stool (black or red color)  Date Last Reviewed: 3/1/2017  © 6029-6900 [employer]. [address] [place] [id number]. All rights reserved. This information is not intended as a substitute for professional medical care. Always follow your healthcare professional's instructions.        Gout Diet  Gout is a painful condition caused by an excess of uric acid, a waste product made by the body. Uric acid forms crystals that collect in the joints. The immune response to these crystals brings on symptoms of joint pain and swelling. This is called a gout attack. Often, medications and diet changes are combined to manage gout. Below are some guidelines for changing your  diet to help you manage gout and prevent attacks. Your health care provider will help you determine the best eating plan for you.     Eating to manage gout  Weight loss for those who are overweight may help reduce gout attacks.  Eat less of these foods  Eating too many foods containing purines may raise the levels of uric acid in your body. This raises your risk for a gout attack. Try to limit these foods and drinks:  · Alcohol, such as beer and red wine. You may be told to avoid alcohol completely.  · Soft drinks that contain sugar or high fructose corn syrup  · Certain fish, including anchovies, sardines, fish eggs, and herring  · Shellfish  · Certain meats, such as red meat, hot dogs, luncheon meats, and turkey  · Organ meats, such as liver, kidneys, and sweetbreads  · Legumes, such as dried beans and peas  · Other high fat foods such as gravy, whole milk, and high fat cheeses  · Vegetables such as asparagus, cauliflower, spinach, and mushrooms used to be thought to contribute to an increased risk for a gout attack, but recent studies show that high purine vegetables don't increase the risk for a gout attack.  Eat more of these foods  Other foods may be helpful for people with gout. Add some of these foods to your diet:  · Cherries contain chemicals that may lower uric acid.  · Omega fatty acids. These are found in some fatty fish such as salmon, certain oils (flax, olive, or nut), and nuts themselves. Omega fatty acids may help prevent inflammation due to gout.  · Dairy products that are low-fat or fat-free, such as cheese and yogurt  · Complex carbohydrate foods, including whole grains, brown rice, oats, and beans  · Coffee, in moderation  · Water, approximately 64 ounces per day  Follow-up care  Follow up with your healthcare provider as advised.  When to seek medical advice  Call your healthcare provider right away if any of these occur:  · Return of gout symptoms, usually at night:  · Severe pain, swelling,  and heat in a joint, especially the base of the big toe  · Affected joint is hard to move  · Skin of the affected joint is purple or red  · Fever of 100.4°F (38°C) or higher  · Pain that doesn't get better even with prescribed medicine   Date Last Reviewed: 1/12/2016  © 5245-4447 [employer]. [address] [place] [address]. All rights reserved. This information is not intended as a substitute for professional medical care. Always follow your healthcare professional's instructions.      Gary Ayoub MD  Go to the Emergency Department for any problems

## 2017-10-14 NOTE — PROGRESS NOTES
"Subjective:       Patient ID: Lizbeth Hutchison is a 77 y.o. female.    Vitals:  height is 5' 3" (1.6 m) and weight is 63 kg (138 lb 14.2 oz). Her oral temperature is 98 °F (36.7 °C). Her blood pressure is 126/64 and her pulse is 59 (abnormal). Her oxygen saturation is 100%.     Chief Complaint: Gout    Pt can not take any antiinflammatories due to her kidneys. C/o waking up yesterday with pain/redness/swelling to right big toe, no recall of trauma, hx of gout to same area about 6 years ago, NOC.      Toe Pain    There was no injury mechanism. The pain is present in the right toes. The quality of the pain is described as burning. The pain is at a severity of 6/10. The pain is moderate. The pain has been constant since onset.     Review of Systems   Constitution: Negative for chills and fever.   HENT: Negative for sore throat.    Eyes: Negative for blurred vision.   Cardiovascular: Negative for chest pain.   Respiratory: Negative for shortness of breath.    Skin: Negative for rash.   Musculoskeletal: Positive for gout and joint pain. Negative for back pain.   Gastrointestinal: Negative for abdominal pain, diarrhea, nausea and vomiting.   Neurological: Negative for headaches.   Psychiatric/Behavioral: The patient is not nervous/anxious.        Objective:      Physical Exam   Constitutional: She appears well-developed.   HENT:   Head: Normocephalic and atraumatic.   Cardiovascular: Normal rate, regular rhythm, normal heart sounds and intact distal pulses.    Pulmonary/Chest: Breath sounds normal.   Musculoskeletal:        Feet:    Right 1st MTP joint area erythema/edema/warmth/tenderness, no drainage/lesions seen, decrease ROM, remainder right foot non tender   Skin: Skin is warm and dry.   Psychiatric: She has a normal mood and affect. Her behavior is normal.       Assessment:       1. Acute gout of right foot, unspecified cause        Plan:         Acute gout of right foot, unspecified cause  -     dexamethasone " injection 4 mg; Inject 0.4 mLs (4 mg total) into the muscle one time.  -     tramadol (ULTRAM) 50 mg tablet; Take 1 tablet (50 mg total) by mouth every 24 hours as needed for Pain.  Dispense: 3 tablet; Refill: 0      Gary Ayoub MD  Go to the Emergency Department for any problems

## 2018-03-27 ENCOUNTER — OFFICE VISIT (OUTPATIENT)
Dept: FAMILY MEDICINE | Facility: CLINIC | Age: 78
End: 2018-03-27
Payer: MEDICARE

## 2018-03-27 VITALS
WEIGHT: 137.81 LBS | DIASTOLIC BLOOD PRESSURE: 48 MMHG | HEIGHT: 63 IN | SYSTOLIC BLOOD PRESSURE: 96 MMHG | TEMPERATURE: 98 F | HEART RATE: 56 BPM | BODY MASS INDEX: 24.42 KG/M2 | OXYGEN SATURATION: 97 %

## 2018-03-27 DIAGNOSIS — M85.80 OSTEOPENIA, UNSPECIFIED LOCATION: ICD-10-CM

## 2018-03-27 DIAGNOSIS — N18.30 BENIGN HYPERTENSION WITH CHRONIC KIDNEY DISEASE, STAGE III: ICD-10-CM

## 2018-03-27 DIAGNOSIS — M89.9 DISORDER OF BONE AND CARTILAGE: ICD-10-CM

## 2018-03-27 DIAGNOSIS — I70.0 ATHEROSCLEROSIS OF ABDOMINAL AORTA: ICD-10-CM

## 2018-03-27 DIAGNOSIS — E55.9 VITAMIN D DEFICIENCY DISEASE: ICD-10-CM

## 2018-03-27 DIAGNOSIS — J06.9 VIRAL URI WITH COUGH: ICD-10-CM

## 2018-03-27 DIAGNOSIS — N25.81 SECONDARY HYPERPARATHYROIDISM OF RENAL ORIGIN: ICD-10-CM

## 2018-03-27 DIAGNOSIS — M94.9 DISORDER OF BONE AND CARTILAGE: ICD-10-CM

## 2018-03-27 DIAGNOSIS — D56.3 ALPHA THALASSEMIA TRAIT: Primary | ICD-10-CM

## 2018-03-27 DIAGNOSIS — I12.9 BENIGN HYPERTENSION WITH CHRONIC KIDNEY DISEASE, STAGE III: ICD-10-CM

## 2018-03-27 DIAGNOSIS — E78.5 HYPERLIPIDEMIA LDL GOAL <100: ICD-10-CM

## 2018-03-27 DIAGNOSIS — F32.1 CURRENT MODERATE EPISODE OF MAJOR DEPRESSIVE DISORDER WITHOUT PRIOR EPISODE: ICD-10-CM

## 2018-03-27 DIAGNOSIS — Z12.39 SCREENING FOR BREAST CANCER: ICD-10-CM

## 2018-03-27 DIAGNOSIS — E11.9 CONTROLLED TYPE 2 DIABETES MELLITUS WITHOUT COMPLICATION, WITHOUT LONG-TERM CURRENT USE OF INSULIN: ICD-10-CM

## 2018-03-27 DIAGNOSIS — H61.23 BILATERAL IMPACTED CERUMEN: ICD-10-CM

## 2018-03-27 DIAGNOSIS — E66.3 OVERWEIGHT (BMI 25.0-29.9): ICD-10-CM

## 2018-03-27 PROCEDURE — 99215 OFFICE O/P EST HI 40 MIN: CPT | Mod: PBBFAC,PO | Performed by: NURSE PRACTITIONER

## 2018-03-27 PROCEDURE — 99999 PR PBB SHADOW E&M-EST. PATIENT-LVL V: CPT | Mod: PBBFAC,,, | Performed by: NURSE PRACTITIONER

## 2018-03-27 PROCEDURE — 99214 OFFICE O/P EST MOD 30 MIN: CPT | Mod: S$PBB,,, | Performed by: NURSE PRACTITIONER

## 2018-03-27 RX ORDER — CITALOPRAM 10 MG/1
10 TABLET ORAL DAILY
Qty: 90 TABLET | Refills: 1 | Status: SHIPPED | OUTPATIENT
Start: 2018-03-27 | End: 2019-04-17

## 2018-03-27 NOTE — PROGRESS NOTES
Subjective:       Patient ID: Lizbeth Hutchison is a 77 y.o. female.    Chief Complaint: Annual Exam    77-year-old female presents to the clinic today for an annual exam.  She states she lost her son November 21, 2017 after he had bilateral lung transplants from sepsis.  She states presently she is taking care of her  at home with hospice due to him having dementia.  She does have a sitter 7 days a week during the day and 3 nights a week.  She's having a lot of trouble with depression.  She denies any anxiety, suicidal ideations, homicidal ideations, or hallucinations.  She says she has good dietary habits.  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  She is compliant with all of her medications.  She takes vitamin D 2000 IUs daily.  She is scheduled to see her eye doctor Dr. Barksdale in April 2018.  She denies any headaches, dizziness, or blurred vision. She denies any cardiac chest pain, heart palpitations, shortness breath, or swelling to lower extremities.  She is due for DEXA scan and also a mammogram next month.  She complains of her both of her ears being stopped up.  She also has a mild cough.      Past Medical History:   Diagnosis Date    Alpha thalassemia trait     Anxiety     Atherosclerosis of abdominal aorta     noted on CT scan 4/6/2016    DDD (degenerative disc disease), lumbar     chronic low back pain    Diverticulosis     History of colonic polyps     last colonoscopy 2011 - normal    Hyperlipidemia LDL goal <100     unable to tolerate statins or Welchol    Hypertension     Osteopenia     no need for medication at this time - last BMD October 2010    Overweight     Type II or unspecified type diabetes mellitus without mention of complication, not stated as uncontrolled     Vitamin D deficiency disease     resolved with supplementation     Past Surgical History:   Procedure Laterality Date    samuel  1994    bilateral    HEMORRHOID SURGERY  1970    KELOID  EXCISION  1974, 1976    abdominal wall    TOTAL ABDOMINAL HYSTERECTOMY  1972    TRIGGER FINGER RELEASE  2003    left hand      reports that she has never smoked. She has never used smokeless tobacco. She reports that she does not drink alcohol.  Review of Systems   Constitutional: Negative for chills and fever.   HENT: Negative for congestion, ear discharge, ear pain, postnasal drip, rhinorrhea, sinus pressure, sneezing and sore throat.         Ears stopped up    Eyes: Negative for pain, discharge and itching.   Respiratory: Positive for cough. Negative for shortness of breath and wheezing.    Cardiovascular: Negative for chest pain, palpitations and leg swelling.   Gastrointestinal: Negative for abdominal pain, diarrhea, nausea and vomiting.   Musculoskeletal: Negative for back pain, neck pain and neck stiffness.   Skin: Negative for rash.   Neurological: Negative for dizziness, light-headedness and headaches.   Hematological: Negative for adenopathy.   Psychiatric/Behavioral: Negative for agitation, behavioral problems, confusion, hallucinations, self-injury, sleep disturbance and suicidal ideas. The patient is not nervous/anxious.         Depression        Objective:      Physical Exam   Constitutional: She is oriented to person, place, and time. She appears well-developed and well-nourished. No distress.   HENT:   Head: Normocephalic and atraumatic.   Nose: Nose normal.   Mouth/Throat: Oropharynx is clear and moist.   Both ear canals large amount of wax    Eyes: Conjunctivae and EOM are normal. Pupils are equal, round, and reactive to light. Right eye exhibits no discharge. Left eye exhibits no discharge. No scleral icterus.   Neck: Normal range of motion. Neck supple. No JVD present. No thyromegaly present.   Cardiovascular: Normal rate and regular rhythm.  Exam reveals no friction rub.    No murmur heard.  Pulmonary/Chest: Effort normal and breath sounds normal. No respiratory distress. She has no wheezes.  She has no rales.   Abdominal: Soft. Bowel sounds are normal. There is no tenderness. There is no rebound and no guarding.   Musculoskeletal: Normal range of motion. She exhibits no edema.   Lymphadenopathy:     She has no cervical adenopathy.   Neurological: She is alert and oriented to person, place, and time. She displays normal reflexes.   Skin: Skin is warm and dry. No rash noted. She is not diaphoretic.   Psychiatric: She has a normal mood and affect. Her behavior is normal. Judgment and thought content normal.       Assessment:       1. Alpha thalassemia trait    2. Atherosclerosis of abdominal aorta    3. Benign hypertension with chronic kidney disease, stage III    4. Controlled type 2 diabetes mellitus without complication, without long-term current use of insulin    5. Hyperlipidemia LDL goal <100    6. Osteopenia, unspecified location    7. Overweight (BMI 25.0-29.9)    8. Secondary hyperparathyroidism of renal origin    9. Vitamin D deficiency disease    10. Screening for breast cancer    11. Disorder of bone and cartilage    12. Bilateral impacted cerumen    13. Current moderate episode of major depressive disorder without prior episode    14. Viral URI with cough        Plan:         Alpha thalassemia trait  - stable observe asymptomatic     Atherosclerosis of abdominal aorta  - Stable / Asymptomatic is on blood pressure and cholesterol lowering medications    Benign hypertension with chronic kidney disease, stage III  -     CBC auto differential; Future; Expected date: 03/27/2018  -     Comprehensive metabolic panel; Future; Expected date: 03/27/2018  - The current medical regimen is effective;  continue present plan and medications.  - avoid all anti-inflammatories  - stay well hydrated    Controlled type 2 diabetes mellitus without complication, without long-term current use of insulin  -     Comprehensive metabolic panel; Future; Expected date: 03/27/2018  -     Lipid panel; Future; Expected date:  03/27/2018  -     Hemoglobin A1c; Future; Expected date: 03/27/2018  - diet controlled    Hyperlipidemia LDL goal <100  - The current medical regimen is effective;  continue present plan and medications.    Osteopenia, unspecified location  - continue calcium and vitamin D    Overweight (BMI 25.0-29.9)  - The patient is asked to make an attempt to improve diet and exercise patterns to aid in medical management of this problem.    Secondary hyperparathyroidism of renal origin  - stable observe asymptomatic     Vitamin D deficiency disease  -     Vitamin D; Future; Expected date: 03/27/2018  - continue Vitamin D 2,000 IU daily    Screening for breast cancer  -     Mammo Digital Screening Bilat with CAD; Future; Expected date: 03/27/2018    Disorder of bone and cartilage  -     DXA Bone Density Spine And Hip; Future; Expected date: 03/27/2018    Bilateral impacted cerumen  - ear wash  - after ear wash able to visualize both TM without any difficulty   - states feels much better now     Current moderate episode of major depressive disorder without prior episode  -     citalopram (CELEXA) 10 MG tablet; Take 1 tablet (10 mg total) by mouth once daily.  Dispense: 90 tablet; Refill: 1  - Start Celexa 10 mg po daily    Viral URI with cough  - Mucinex DM as directed

## 2018-03-27 NOTE — PATIENT INSTRUCTIONS
Continue all current medications   Watch diet closely  Continue exercising   Dexa and mammogram scheduled  Fasting labs scheduled   Eye exam due with Dr. Barksdale 4/2018  Mucinex DM as directed for cough

## 2018-03-28 ENCOUNTER — LAB VISIT (OUTPATIENT)
Dept: LAB | Facility: HOSPITAL | Age: 78
End: 2018-03-28
Attending: NURSE PRACTITIONER
Payer: MEDICARE

## 2018-03-28 DIAGNOSIS — E11.9 CONTROLLED TYPE 2 DIABETES MELLITUS WITHOUT COMPLICATION, WITHOUT LONG-TERM CURRENT USE OF INSULIN: ICD-10-CM

## 2018-03-28 DIAGNOSIS — N18.30 BENIGN HYPERTENSION WITH CHRONIC KIDNEY DISEASE, STAGE III: ICD-10-CM

## 2018-03-28 DIAGNOSIS — E55.9 VITAMIN D DEFICIENCY DISEASE: ICD-10-CM

## 2018-03-28 DIAGNOSIS — I12.9 BENIGN HYPERTENSION WITH CHRONIC KIDNEY DISEASE, STAGE III: ICD-10-CM

## 2018-03-28 LAB
25(OH)D3+25(OH)D2 SERPL-MCNC: 44 NG/ML
ALBUMIN SERPL BCP-MCNC: 4.2 G/DL
ALP SERPL-CCNC: 77 U/L
ALT SERPL W/O P-5'-P-CCNC: 13 U/L
ANION GAP SERPL CALC-SCNC: 11 MMOL/L
AST SERPL-CCNC: 14 U/L
BASOPHILS # BLD AUTO: 0.04 K/UL
BASOPHILS NFR BLD: 0.7 %
BILIRUB SERPL-MCNC: 0.6 MG/DL
BUN SERPL-MCNC: 25 MG/DL
CALCIUM SERPL-MCNC: 9.5 MG/DL
CHLORIDE SERPL-SCNC: 106 MMOL/L
CHOLEST SERPL-MCNC: 239 MG/DL
CHOLEST/HDLC SERPL: 5.2 {RATIO}
CO2 SERPL-SCNC: 27 MMOL/L
CREAT SERPL-MCNC: 1.5 MG/DL
DIFFERENTIAL METHOD: ABNORMAL
EOSINOPHIL # BLD AUTO: 0.2 K/UL
EOSINOPHIL NFR BLD: 3.3 %
ERYTHROCYTE [DISTWIDTH] IN BLOOD BY AUTOMATED COUNT: 16.8 %
EST. GFR  (AFRICAN AMERICAN): 38.5 ML/MIN/1.73 M^2
EST. GFR  (NON AFRICAN AMERICAN): 33.4 ML/MIN/1.73 M^2
ESTIMATED AVG GLUCOSE: 120 MG/DL
GLUCOSE SERPL-MCNC: 94 MG/DL
HBA1C MFR BLD HPLC: 5.8 %
HCT VFR BLD AUTO: 44.2 %
HDLC SERPL-MCNC: 46 MG/DL
HDLC SERPL: 19.2 %
HGB BLD-MCNC: 13 G/DL
IMM GRANULOCYTES # BLD AUTO: 0.02 K/UL
IMM GRANULOCYTES NFR BLD AUTO: 0.3 %
LDLC SERPL CALC-MCNC: 157.4 MG/DL
LYMPHOCYTES # BLD AUTO: 1.5 K/UL
LYMPHOCYTES NFR BLD: 26.1 %
MCH RBC QN AUTO: 22.3 PG
MCHC RBC AUTO-ENTMCNC: 29.4 G/DL
MCV RBC AUTO: 76 FL
MONOCYTES # BLD AUTO: 0.5 K/UL
MONOCYTES NFR BLD: 8.7 %
NEUTROPHILS # BLD AUTO: 3.5 K/UL
NEUTROPHILS NFR BLD: 60.9 %
NONHDLC SERPL-MCNC: 193 MG/DL
NRBC BLD-RTO: 0 /100 WBC
PLATELET # BLD AUTO: 188 K/UL
PMV BLD AUTO: 13 FL
POTASSIUM SERPL-SCNC: 4.2 MMOL/L
PROT SERPL-MCNC: 7.7 G/DL
RBC # BLD AUTO: 5.83 M/UL
SODIUM SERPL-SCNC: 144 MMOL/L
TRIGL SERPL-MCNC: 178 MG/DL
WBC # BLD AUTO: 5.78 K/UL

## 2018-03-28 PROCEDURE — 80053 COMPREHEN METABOLIC PANEL: CPT

## 2018-03-28 PROCEDURE — 85025 COMPLETE CBC W/AUTO DIFF WBC: CPT

## 2018-03-28 PROCEDURE — 36415 COLL VENOUS BLD VENIPUNCTURE: CPT | Mod: PO

## 2018-03-28 PROCEDURE — 80061 LIPID PANEL: CPT

## 2018-03-28 PROCEDURE — 82306 VITAMIN D 25 HYDROXY: CPT

## 2018-03-28 PROCEDURE — 83036 HEMOGLOBIN GLYCOSYLATED A1C: CPT

## 2018-04-03 ENCOUNTER — TELEPHONE (OUTPATIENT)
Dept: FAMILY MEDICINE | Facility: CLINIC | Age: 78
End: 2018-04-03

## 2018-04-03 ENCOUNTER — PATIENT MESSAGE (OUTPATIENT)
Dept: FAMILY MEDICINE | Facility: CLINIC | Age: 78
End: 2018-04-03

## 2018-04-03 NOTE — TELEPHONE ENCOUNTER
I discussed her lab results with her that I had sent over [employer] and she verbalized understanding of above.

## 2018-04-19 ENCOUNTER — TELEPHONE (OUTPATIENT)
Dept: FAMILY MEDICINE | Facility: CLINIC | Age: 78
End: 2018-04-19

## 2018-04-19 ENCOUNTER — HOSPITAL ENCOUNTER (OUTPATIENT)
Dept: RADIOLOGY | Facility: CLINIC | Age: 78
Discharge: HOME | End: 2018-04-19
Attending: NURSE PRACTITIONER
Payer: MEDICARE

## 2018-04-19 ENCOUNTER — HOSPITAL ENCOUNTER (OUTPATIENT)
Dept: RADIOLOGY | Facility: HOSPITAL | Age: 78
Discharge: HOME | End: 2018-04-19
Attending: NURSE PRACTITIONER
Payer: MEDICARE

## 2018-04-19 DIAGNOSIS — Z12.39 SCREENING FOR BREAST CANCER: ICD-10-CM

## 2018-04-19 DIAGNOSIS — M94.9 DISORDER OF BONE AND CARTILAGE: ICD-10-CM

## 2018-04-19 DIAGNOSIS — M89.9 DISORDER OF BONE AND CARTILAGE: ICD-10-CM

## 2018-04-19 PROCEDURE — 77067 SCR MAMMO BI INCL CAD: CPT | Mod: TC,PO

## 2018-04-19 PROCEDURE — 77080 DXA BONE DENSITY AXIAL: CPT | Mod: 26,,, | Performed by: INTERNAL MEDICINE

## 2018-04-19 PROCEDURE — 77063 BREAST TOMOSYNTHESIS BI: CPT | Mod: 26,,, | Performed by: RADIOLOGY

## 2018-04-19 PROCEDURE — 77067 SCR MAMMO BI INCL CAD: CPT | Mod: 26,,, | Performed by: RADIOLOGY

## 2018-04-19 PROCEDURE — 77080 DXA BONE DENSITY AXIAL: CPT | Mod: TC,PO

## 2018-04-19 NOTE — TELEPHONE ENCOUNTER
Please let the patient know her mammogram showed no evidence for malignancy.    Thanks!  JHONY JeanC

## 2018-04-24 LAB
LEFT EYE DM RETINOPATHY: NEGATIVE
RIGHT EYE DM RETINOPATHY: NEGATIVE

## 2018-04-25 ENCOUNTER — TELEPHONE (OUTPATIENT)
Dept: FAMILY MEDICINE | Facility: CLINIC | Age: 78
End: 2018-04-25

## 2018-04-25 NOTE — TELEPHONE ENCOUNTER
I spoke with the patient and explained that her bone dexa scan showed osteopenia. I want her to continue taking her calcium and vitamin d and exercising. I also explained to her that her mammogram was normal. She needs to repeat her dexa in 2 years and mammogram in one year. Patient verbalized understanding understanding of above.

## 2018-06-19 PROBLEM — T46.6X5A STATIN MYOPATHY: Status: ACTIVE | Noted: 2018-06-19

## 2018-06-19 PROBLEM — G72.0 STATIN MYOPATHY: Status: ACTIVE | Noted: 2018-06-19

## 2018-06-26 DIAGNOSIS — I12.9 BENIGN HYPERTENSION WITH CHRONIC KIDNEY DISEASE, STAGE III: ICD-10-CM

## 2018-06-26 DIAGNOSIS — N18.30 BENIGN HYPERTENSION WITH CHRONIC KIDNEY DISEASE, STAGE III: ICD-10-CM

## 2018-06-27 RX ORDER — LISINOPRIL 40 MG/1
40 TABLET ORAL DAILY
Qty: 90 TABLET | Refills: 0 | Status: SHIPPED | OUTPATIENT
Start: 2018-06-27 | End: 2018-09-24 | Stop reason: SDUPTHER

## 2018-06-27 RX ORDER — HYDROCHLOROTHIAZIDE 25 MG/1
25 TABLET ORAL DAILY
Qty: 90 TABLET | Refills: 0 | Status: SHIPPED | OUTPATIENT
Start: 2018-06-27 | End: 2018-09-24 | Stop reason: SDUPTHER

## 2018-09-24 DIAGNOSIS — I12.9 BENIGN HYPERTENSION WITH CHRONIC KIDNEY DISEASE, STAGE III: ICD-10-CM

## 2018-09-24 DIAGNOSIS — E11.9 CONTROLLED TYPE 2 DIABETES MELLITUS WITHOUT COMPLICATION, WITHOUT LONG-TERM CURRENT USE OF INSULIN: Primary | ICD-10-CM

## 2018-09-24 DIAGNOSIS — E78.5 HYPERLIPIDEMIA LDL GOAL <100: ICD-10-CM

## 2018-09-24 DIAGNOSIS — N18.30 BENIGN HYPERTENSION WITH CHRONIC KIDNEY DISEASE, STAGE III: ICD-10-CM

## 2018-09-24 RX ORDER — HYDROCHLOROTHIAZIDE 25 MG/1
TABLET ORAL
Qty: 90 TABLET | Refills: 0 | Status: SHIPPED | OUTPATIENT
Start: 2018-09-24 | End: 2018-12-17 | Stop reason: SDUPTHER

## 2018-09-24 RX ORDER — LISINOPRIL 40 MG/1
TABLET ORAL
Qty: 90 TABLET | Refills: 0 | Status: SHIPPED | OUTPATIENT
Start: 2018-09-24 | End: 2018-12-17 | Stop reason: SDUPTHER

## 2018-09-28 ENCOUNTER — LAB VISIT (OUTPATIENT)
Dept: LAB | Facility: HOSPITAL | Age: 78
End: 2018-09-28
Attending: INTERNAL MEDICINE
Payer: MEDICARE

## 2018-09-28 DIAGNOSIS — E11.9 CONTROLLED TYPE 2 DIABETES MELLITUS WITHOUT COMPLICATION, WITHOUT LONG-TERM CURRENT USE OF INSULIN: ICD-10-CM

## 2018-09-28 LAB
ESTIMATED AVG GLUCOSE: 123 MG/DL
HBA1C MFR BLD HPLC: 5.9 %

## 2018-09-28 PROCEDURE — 36415 COLL VENOUS BLD VENIPUNCTURE: CPT | Mod: PO

## 2018-09-28 PROCEDURE — 83036 HEMOGLOBIN GLYCOSYLATED A1C: CPT

## 2018-10-03 ENCOUNTER — LAB VISIT (OUTPATIENT)
Dept: LAB | Facility: HOSPITAL | Age: 78
End: 2018-10-03
Payer: MEDICARE

## 2018-10-03 ENCOUNTER — OFFICE VISIT (OUTPATIENT)
Dept: FAMILY MEDICINE | Facility: CLINIC | Age: 78
End: 2018-10-03
Payer: MEDICARE

## 2018-10-03 VITALS
SYSTOLIC BLOOD PRESSURE: 126 MMHG | DIASTOLIC BLOOD PRESSURE: 60 MMHG | WEIGHT: 140.13 LBS | OXYGEN SATURATION: 97 % | HEIGHT: 63 IN | BODY MASS INDEX: 24.83 KG/M2 | HEART RATE: 55 BPM | TEMPERATURE: 99 F

## 2018-10-03 DIAGNOSIS — N18.30 BENIGN HYPERTENSION WITH CHRONIC KIDNEY DISEASE, STAGE III: ICD-10-CM

## 2018-10-03 DIAGNOSIS — N25.81 SECONDARY HYPERPARATHYROIDISM OF RENAL ORIGIN: ICD-10-CM

## 2018-10-03 DIAGNOSIS — I12.9 BENIGN HYPERTENSION WITH CHRONIC KIDNEY DISEASE, STAGE III: ICD-10-CM

## 2018-10-03 DIAGNOSIS — E55.9 VITAMIN D DEFICIENCY DISEASE: ICD-10-CM

## 2018-10-03 DIAGNOSIS — Z23 NEED FOR IMMUNIZATION AGAINST INFLUENZA: ICD-10-CM

## 2018-10-03 DIAGNOSIS — M85.80 OSTEOPENIA, UNSPECIFIED LOCATION: ICD-10-CM

## 2018-10-03 DIAGNOSIS — E78.5 HYPERLIPIDEMIA LDL GOAL <100: ICD-10-CM

## 2018-10-03 DIAGNOSIS — D56.3 ALPHA THALASSEMIA TRAIT: Primary | ICD-10-CM

## 2018-10-03 DIAGNOSIS — I70.0 ATHEROSCLEROSIS OF ABDOMINAL AORTA: ICD-10-CM

## 2018-10-03 DIAGNOSIS — E11.9 CONTROLLED TYPE 2 DIABETES MELLITUS WITHOUT COMPLICATION, WITHOUT LONG-TERM CURRENT USE OF INSULIN: ICD-10-CM

## 2018-10-03 DIAGNOSIS — T46.6X5A STATIN MYOPATHY: ICD-10-CM

## 2018-10-03 DIAGNOSIS — G72.0 STATIN MYOPATHY: ICD-10-CM

## 2018-10-03 DIAGNOSIS — Z23 NEEDS FLU SHOT: ICD-10-CM

## 2018-10-03 PROBLEM — E66.3 OVERWEIGHT (BMI 25.0-29.9): Status: RESOLVED | Noted: 2017-04-10 | Resolved: 2018-10-03

## 2018-10-03 LAB
ANION GAP SERPL CALC-SCNC: 9 MMOL/L
BUN SERPL-MCNC: 21 MG/DL
CALCIUM SERPL-MCNC: 9.5 MG/DL
CHLORIDE SERPL-SCNC: 104 MMOL/L
CO2 SERPL-SCNC: 27 MMOL/L
CREAT SERPL-MCNC: 1.4 MG/DL
EST. GFR  (AFRICAN AMERICAN): 41.5 ML/MIN/1.73 M^2
EST. GFR  (NON AFRICAN AMERICAN): 36 ML/MIN/1.73 M^2
GLUCOSE SERPL-MCNC: 79 MG/DL
POTASSIUM SERPL-SCNC: 4 MMOL/L
PTH-INTACT SERPL-MCNC: 116 PG/ML
SODIUM SERPL-SCNC: 140 MMOL/L

## 2018-10-03 PROCEDURE — 90662 IIV NO PRSV INCREASED AG IM: CPT | Mod: PBBFAC,PO

## 2018-10-03 PROCEDURE — 80048 BASIC METABOLIC PNL TOTAL CA: CPT

## 2018-10-03 PROCEDURE — 99214 OFFICE O/P EST MOD 30 MIN: CPT | Mod: PBBFAC,PO | Performed by: NURSE PRACTITIONER

## 2018-10-03 PROCEDURE — 99999 PR PBB SHADOW E&M-EST. PATIENT-LVL IV: CPT | Mod: PBBFAC,,, | Performed by: NURSE PRACTITIONER

## 2018-10-03 PROCEDURE — 99214 OFFICE O/P EST MOD 30 MIN: CPT | Mod: S$PBB,,, | Performed by: NURSE PRACTITIONER

## 2018-10-03 PROCEDURE — 36415 COLL VENOUS BLD VENIPUNCTURE: CPT | Mod: PO

## 2018-10-03 PROCEDURE — 83970 ASSAY OF PARATHORMONE: CPT

## 2018-10-03 NOTE — PROGRESS NOTES
Subjective:       Patient ID: Lizbeth Hutchison is a 78 y.o. female.    Chief Complaint: Annual Exam    78-year-old female presents to the clinic today for routine annual checkup.  She has poor dietary habits.  She dates is 3 days a week.  She is compliant with all of her medications.  She denies any headaches, dizziness, or blurred vision.  She denies any cardiac chest pain, heart palpitations, shortness breath, or swelling to lower extremities.  I did explain to her that her hemoglobin A1c was 5.9 which meant that her diabetes was well controlled.  She is unable to tolerate statins due to muscle pain and cramping.  She would like a breast exam done today.  She is also due for flu vaccine the foot exam.      Past Medical History:   Diagnosis Date    Alpha thalassemia trait     Anxiety     Atherosclerosis of abdominal aorta     noted on CT scan 4/6/2016    DDD (degenerative disc disease), lumbar     chronic low back pain    Diverticulosis     History of colonic polyps     last colonoscopy 2011 - normal    Hyperlipidemia LDL goal <100     unable to tolerate statins or Welchol    Hypertension     Osteopenia     no need for medication at this time - last BMD October 2010    Overweight     Type II or unspecified type diabetes mellitus without mention of complication, not stated as uncontrolled     Vitamin D deficiency disease     resolved with supplementation     Past Surgical History:   Procedure Laterality Date    hammalexe 1994    bilateral    HEMORRHOID SURGERY  1970    KELOID EXCISION  1974, 1976    abdominal wall    TOTAL ABDOMINAL HYSTERECTOMY  1972    TRIGGER FINGER RELEASE  2003    left hand      reports that  has never smoked. she has never used smokeless tobacco. She reports that she does not drink alcohol.  Review of Systems   Constitutional: Negative for chills and fever.   HENT: Negative for congestion, ear discharge, ear pain, postnasal drip, rhinorrhea, sinus pressure and sore  throat.    Eyes: Negative for pain, discharge, redness and itching.   Respiratory: Negative for cough, shortness of breath and wheezing.    Cardiovascular: Negative for chest pain, palpitations and leg swelling.   Gastrointestinal: Negative for abdominal pain, constipation, diarrhea, nausea and vomiting.   Genitourinary: Negative for dysuria and frequency.   Musculoskeletal: Negative for gait problem, neck pain and neck stiffness.   Skin: Negative for rash.   Neurological: Negative for dizziness, light-headedness and headaches.       Objective:      Physical Exam   Constitutional: She is oriented to person, place, and time. She appears well-developed and well-nourished. No distress.   Eyes: Conjunctivae and EOM are normal. Pupils are equal, round, and reactive to light. Right eye exhibits no discharge. Left eye exhibits no discharge. No scleral icterus.   Neck: Normal range of motion. Neck supple. No JVD present.   Cardiovascular: Normal rate, regular rhythm and normal heart sounds.   No murmur heard.  Pulmonary/Chest: Effort normal and breath sounds normal. No respiratory distress. She has no wheezes. She has no rales.   Abdominal: Soft. Bowel sounds are normal. There is no tenderness.   Genitourinary:   Genitourinary Comments: Breast symmetrical no palpable masses, tenderness or pain no nipple discharge no axilla adenopathy    Musculoskeletal: Normal range of motion. She exhibits no edema.   Protective Sensation (w/ 10 gram monofilament):  Right: Intact  Left: Intact    Visual Inspection:  normal    Pedal Pulses:   Right: Present  Left: Present    Posterior tibialis:   Right:Present  Left: Present     Neurological: She is alert and oriented to person, place, and time.   Skin: Skin is warm and dry. She is not diaphoretic.   Psychiatric: She has a normal mood and affect.       Assessment:       1. Alpha thalassemia trait    2. Atherosclerosis of abdominal aorta    3. Benign hypertension with chronic kidney disease,  stage III    4. Controlled type 2 diabetes mellitus without complication, without long-term current use of insulin    5. Hyperlipidemia LDL goal <100    6. Osteopenia, unspecified location    7. Secondary hyperparathyroidism of renal origin    8. Statin myopathy    9. Vitamin D deficiency disease    10. Need for immunization against influenza    11. Needs flu shot        Plan:         Alpha thalassemia trait  - stable observe asymptomatic     Atherosclerosis of abdominal aorta  - Stable / Asymptomatic is on blood pressure lowering medications and cholesterol monitoring unable to tolerate statins on omega 3 fatty acids     Benign hypertension with chronic kidney disease, stage III  -     Basic metabolic panel; Future; Expected date: 10/03/2018  -     Microalbumin/creatinine urine ratio; Future; Expected date: 10/03/2018  - avoid all anti-inflammatories and stay well hydrated     Controlled type 2 diabetes mellitus without complication, without long-term current use of insulin  -     MICROALBUMIN / CREATININE RATIO URINE  - diet controlled     Hyperlipidemia LDL goal <100  - unable to tolerate statins on omega 3 fatty acids     Osteopenia, unspecified location  - no treatment needed at this time last BMD 3/2016    Secondary hyperparathyroidism of renal origin  -     PTH, intact; Future; Expected date: 10/03/2018  - stable observe asymptomatic     Statin myopathy  - unable to tolerate statins or Welchol    Vitamin D deficiency disease  - The current medical regimen is effective;  continue present plan and medications.    Need for immunization against influenza  -     Influenza - High Dose (65+) (PF) (IM)    Needs flu shot    Does not know her pass word to [facility]

## 2018-10-03 NOTE — PATIENT INSTRUCTIONS
Watch diet closely  Continue exercising   Continue all current medications  Avoid all anti-inflammatories and stay well hydrated   Flu shot today  Check PTH and BMP  Urine Microabuminuria

## 2018-10-04 ENCOUNTER — TELEPHONE (OUTPATIENT)
Dept: FAMILY MEDICINE | Facility: CLINIC | Age: 78
End: 2018-10-04

## 2018-10-04 NOTE — TELEPHONE ENCOUNTER
I spoke with the patient and explained that she did not have any protein in her urine. Her BUN and Creat was normal now. Her GFR was slightly improved but she still needed to avoid all anti-inflammatories and stay well hydrated. I explained that her PTH has increased but she was asymptomatic so we will continue to monitor every year. Patient verbalized understanding of above.

## 2018-12-17 DIAGNOSIS — I12.9 BENIGN HYPERTENSION WITH CHRONIC KIDNEY DISEASE, STAGE III: ICD-10-CM

## 2018-12-17 DIAGNOSIS — N18.30 BENIGN HYPERTENSION WITH CHRONIC KIDNEY DISEASE, STAGE III: ICD-10-CM

## 2018-12-17 RX ORDER — LISINOPRIL 40 MG/1
TABLET ORAL
Qty: 90 TABLET | Refills: 0 | Status: SHIPPED | OUTPATIENT
Start: 2018-12-17 | End: 2019-04-17 | Stop reason: SDUPTHER

## 2018-12-17 RX ORDER — HYDROCHLOROTHIAZIDE 25 MG/1
TABLET ORAL
Qty: 90 TABLET | Refills: 0 | Status: SHIPPED | OUTPATIENT
Start: 2018-12-17 | End: 2019-04-17 | Stop reason: SDUPTHER

## 2019-02-18 ENCOUNTER — PES CALL (OUTPATIENT)
Dept: ADMINISTRATIVE | Facility: CLINIC | Age: 79
End: 2019-02-18

## 2019-03-22 DIAGNOSIS — E78.5 HYPERLIPIDEMIA LDL GOAL <100: ICD-10-CM

## 2019-03-22 RX ORDER — MONTELUKAST SODIUM 4 MG/1
TABLET, CHEWABLE ORAL
Qty: 360 TABLET | Refills: 0 | Status: SHIPPED | OUTPATIENT
Start: 2019-03-22 | End: 2019-04-17 | Stop reason: SDUPTHER

## 2019-04-03 ENCOUNTER — PATIENT OUTREACH (OUTPATIENT)
Dept: ADMINISTRATIVE | Facility: HOSPITAL | Age: 79
End: 2019-04-03

## 2019-04-03 DIAGNOSIS — E11.9 DIABETES MELLITUS WITHOUT COMPLICATION: Primary | ICD-10-CM

## 2019-04-03 DIAGNOSIS — Z12.31 ENCOUNTER FOR SCREENING MAMMOGRAM FOR MALIGNANT NEOPLASM OF BREAST: ICD-10-CM

## 2019-04-03 DIAGNOSIS — Z23 VACCINE FOR VZV (VARICELLA-ZOSTER VIRUS): ICD-10-CM

## 2019-04-03 DIAGNOSIS — E78.5 HYPERLIPIDEMIA, UNSPECIFIED HYPERLIPIDEMIA TYPE: ICD-10-CM

## 2019-04-09 ENCOUNTER — LAB VISIT (OUTPATIENT)
Dept: LAB | Facility: HOSPITAL | Age: 79
End: 2019-04-09
Attending: INTERNAL MEDICINE
Payer: MEDICARE

## 2019-04-09 DIAGNOSIS — E11.9 DIABETES MELLITUS WITHOUT COMPLICATION: ICD-10-CM

## 2019-04-09 DIAGNOSIS — E78.5 HYPERLIPIDEMIA, UNSPECIFIED HYPERLIPIDEMIA TYPE: ICD-10-CM

## 2019-04-09 LAB
ALBUMIN SERPL BCP-MCNC: 3.9 G/DL (ref 3.5–5.2)
ALP SERPL-CCNC: 75 U/L (ref 55–135)
ALT SERPL W/O P-5'-P-CCNC: 9 U/L (ref 10–44)
ANION GAP SERPL CALC-SCNC: 10 MMOL/L (ref 8–16)
AST SERPL-CCNC: 13 U/L (ref 10–40)
BILIRUB SERPL-MCNC: 0.5 MG/DL (ref 0.1–1)
BUN SERPL-MCNC: 34 MG/DL (ref 8–23)
CALCIUM SERPL-MCNC: 9.8 MG/DL (ref 8.7–10.5)
CHLORIDE SERPL-SCNC: 106 MMOL/L (ref 95–110)
CHOLEST SERPL-MCNC: 233 MG/DL (ref 120–199)
CHOLEST/HDLC SERPL: 5.8 {RATIO} (ref 2–5)
CO2 SERPL-SCNC: 26 MMOL/L (ref 23–29)
CREAT SERPL-MCNC: 1.9 MG/DL (ref 0.5–1.4)
EST. GFR  (AFRICAN AMERICAN): 28.5 ML/MIN/1.73 M^2
EST. GFR  (NON AFRICAN AMERICAN): 24.7 ML/MIN/1.73 M^2
ESTIMATED AVG GLUCOSE: 126 MG/DL (ref 68–131)
GLUCOSE SERPL-MCNC: 86 MG/DL (ref 70–110)
HBA1C MFR BLD HPLC: 6 % (ref 4–5.6)
HDLC SERPL-MCNC: 40 MG/DL (ref 40–75)
HDLC SERPL: 17.2 % (ref 20–50)
LDLC SERPL CALC-MCNC: 146.8 MG/DL (ref 63–159)
NONHDLC SERPL-MCNC: 193 MG/DL
POTASSIUM SERPL-SCNC: 4.1 MMOL/L (ref 3.5–5.1)
PROT SERPL-MCNC: 7.4 G/DL (ref 6–8.4)
SODIUM SERPL-SCNC: 142 MMOL/L (ref 136–145)
TRIGL SERPL-MCNC: 231 MG/DL (ref 30–150)

## 2019-04-09 PROCEDURE — 80053 COMPREHEN METABOLIC PANEL: CPT

## 2019-04-09 PROCEDURE — 80061 LIPID PANEL: CPT

## 2019-04-09 PROCEDURE — 83036 HEMOGLOBIN GLYCOSYLATED A1C: CPT

## 2019-04-09 PROCEDURE — 36415 COLL VENOUS BLD VENIPUNCTURE: CPT | Mod: PO

## 2019-04-16 ENCOUNTER — PATIENT MESSAGE (OUTPATIENT)
Dept: ADMINISTRATIVE | Facility: OTHER | Age: 79
End: 2019-04-16

## 2019-04-17 ENCOUNTER — OFFICE VISIT (OUTPATIENT)
Dept: FAMILY MEDICINE | Facility: CLINIC | Age: 79
End: 2019-04-17
Payer: MEDICARE

## 2019-04-17 VITALS
HEIGHT: 63 IN | OXYGEN SATURATION: 96 % | BODY MASS INDEX: 25.98 KG/M2 | HEART RATE: 63 BPM | TEMPERATURE: 98 F | DIASTOLIC BLOOD PRESSURE: 66 MMHG | WEIGHT: 146.63 LBS | SYSTOLIC BLOOD PRESSURE: 106 MMHG

## 2019-04-17 DIAGNOSIS — Z71.89 ADVANCED DIRECTIVES, COUNSELING/DISCUSSION: ICD-10-CM

## 2019-04-17 DIAGNOSIS — D56.3 ALPHA THALASSEMIA TRAIT: ICD-10-CM

## 2019-04-17 DIAGNOSIS — I70.0 ATHEROSCLEROSIS OF ABDOMINAL AORTA: ICD-10-CM

## 2019-04-17 DIAGNOSIS — T46.6X5A STATIN MYOPATHY: ICD-10-CM

## 2019-04-17 DIAGNOSIS — G72.0 STATIN MYOPATHY: ICD-10-CM

## 2019-04-17 DIAGNOSIS — E78.5 HYPERLIPIDEMIA LDL GOAL <100: ICD-10-CM

## 2019-04-17 DIAGNOSIS — E11.9 CONTROLLED TYPE 2 DIABETES MELLITUS WITHOUT COMPLICATION, WITHOUT LONG-TERM CURRENT USE OF INSULIN: ICD-10-CM

## 2019-04-17 DIAGNOSIS — Z00.00 ROUTINE MEDICAL EXAM: Primary | ICD-10-CM

## 2019-04-17 DIAGNOSIS — E55.9 VITAMIN D DEFICIENCY DISEASE: ICD-10-CM

## 2019-04-17 DIAGNOSIS — Z23 NEED FOR TDAP VACCINATION: ICD-10-CM

## 2019-04-17 DIAGNOSIS — Z23 NEED FOR SHINGLES VACCINE: ICD-10-CM

## 2019-04-17 DIAGNOSIS — E11.9 TYPE 2 DIABETES MELLITUS: ICD-10-CM

## 2019-04-17 DIAGNOSIS — I12.9 BENIGN HYPERTENSION WITH CHRONIC KIDNEY DISEASE, STAGE III: ICD-10-CM

## 2019-04-17 DIAGNOSIS — N18.30 BENIGN HYPERTENSION WITH CHRONIC KIDNEY DISEASE, STAGE III: ICD-10-CM

## 2019-04-17 DIAGNOSIS — N25.81 SECONDARY HYPERPARATHYROIDISM OF RENAL ORIGIN: ICD-10-CM

## 2019-04-17 DIAGNOSIS — M85.80 OSTEOPENIA, UNSPECIFIED LOCATION: ICD-10-CM

## 2019-04-17 PROBLEM — M10.9 ACUTE GOUT OF RIGHT FOOT: Status: RESOLVED | Noted: 2017-10-14 | Resolved: 2019-04-17

## 2019-04-17 PROCEDURE — 99213 OFFICE O/P EST LOW 20 MIN: CPT | Mod: PBBFAC,PO | Performed by: INTERNAL MEDICINE

## 2019-04-17 PROCEDURE — 99213 PR OFFICE/OUTPT VISIT, EST, LEVL III, 20-29 MIN: ICD-10-PCS | Mod: S$PBB,,, | Performed by: INTERNAL MEDICINE

## 2019-04-17 PROCEDURE — 99999 PR PBB SHADOW E&M-EST. PATIENT-LVL III: ICD-10-PCS | Mod: PBBFAC,,, | Performed by: INTERNAL MEDICINE

## 2019-04-17 PROCEDURE — 99999 PR PBB SHADOW E&M-EST. PATIENT-LVL III: CPT | Mod: PBBFAC,,, | Performed by: INTERNAL MEDICINE

## 2019-04-17 PROCEDURE — 99213 OFFICE O/P EST LOW 20 MIN: CPT | Mod: S$PBB,,, | Performed by: INTERNAL MEDICINE

## 2019-04-17 RX ORDER — HYDROCHLOROTHIAZIDE 25 MG/1
25 TABLET ORAL DAILY
Qty: 90 TABLET | Refills: 1 | Status: SHIPPED | OUTPATIENT
Start: 2019-04-17 | End: 2019-08-12 | Stop reason: SDUPTHER

## 2019-04-17 RX ORDER — MONTELUKAST SODIUM 4 MG/1
2 TABLET, CHEWABLE ORAL 2 TIMES DAILY
Qty: 360 TABLET | Refills: 1 | Status: SHIPPED | OUTPATIENT
Start: 2019-04-17 | End: 2019-10-28 | Stop reason: SDUPTHER

## 2019-04-17 RX ORDER — LISINOPRIL 40 MG/1
40 TABLET ORAL DAILY
Qty: 90 TABLET | Refills: 1 | Status: SHIPPED | OUTPATIENT
Start: 2019-04-17 | End: 2019-08-12 | Stop reason: SDUPTHER

## 2019-04-17 NOTE — PROGRESS NOTES
HISTORY OF PRESENT ILLNESS:  Lizbeth Hutchison is a 79 y.o. female who presents to the clinic today for a routine medical physical exam. Her last physical exam was approximately 1 years(s) ago.        PAST MEDICAL HISTORY:  Past Medical History:   Diagnosis Date    Alpha thalassemia trait     Anxiety     Atherosclerosis of abdominal aorta     noted on CT scan 4/6/2016    DDD (degenerative disc disease), lumbar     chronic low back pain    Diverticulosis     History of colonic polyps     last colonoscopy 2011 - normal    Hyperlipidemia LDL goal <100     unable to tolerate statins or Welchol    Hypertension     Osteopenia     no need for medication at this time - last BMD October 2010    Overweight     Type II or unspecified type diabetes mellitus without mention of complication, not stated as uncontrolled     Vitamin D deficiency disease     resolved with supplementation       PAST SURGICAL HISTORY:  Past Surgical History:   Procedure Laterality Date    hammertoe 1994    bilateral    HEMORRHOID SURGERY  1970    KELOID EXCISION  1974, 1976    abdominal wall    TOTAL ABDOMINAL HYSTERECTOMY  1972    TRIGGER FINGER RELEASE  2003    left hand       SOCIAL HISTORY:  Social History     Socioeconomic History    Marital status:      Spouse name: Not on file    Number of children: 2    Years of education: Not on file    Highest education level: Not on file   Occupational History    Occupation:    Social Needs    Financial resource strain: Not on file    Food insecurity:     Worry: Not on file     Inability: Not on file    Transportation needs:     Medical: Not on file     Non-medical: Not on file   Tobacco Use    Smoking status: Never Smoker    Smokeless tobacco: Never Used   Substance and Sexual Activity    Alcohol use: No    Drug use: Not on file    Sexual activity: Not on file   Lifestyle    Physical activity:     Days per week: Not on file     Minutes per  session: Not on file    Stress: Not on file   Relationships    Social connections:     Talks on phone: Not on file     Gets together: Not on file     Attends Jain service: Not on file     Active member of club or organization: Not on file     Attends meetings of clubs or organizations: Not on file     Relationship status: Not on file   Other Topics Concern    Not on file   Social History Narrative    Not on file       FAMILY HISTORY:  Family History   Adopted: Yes   Problem Relation Age of Onset    Heart failure Mother     Alzheimer's disease Mother     Breast cancer Maternal Grandmother     Other Sister          from too much anesthesia    Congenital heart disease Brother     Arthritis Sister         back problems    Hypertension Sister     Transient ischemic attack Sister     Stroke Brother     Coronary artery disease Brother         s/p stenting    Stroke Brother     Colon cancer Other     Other Son         had eosinophilic granulomatosis -  shortly after lung transplant    Diabetes Neg Hx        ALLERGIES AND MEDICATIONS: updated and reviewed.  Review of patient's allergies indicates:   Allergen Reactions    Cholesterol analogues Other (See Comments)     Muscle spasam    Clindamycin Hives    Latex, natural rubber     Statins-hmg-coa reductase inhibitors Other (See Comments)    Sulfa (sulfonamide antibiotics)     Tramadol Nausea And Vomiting    Welchol [colesevelam] Other (See Comments)    Codeine Rash    Penicillins Rash     Medication List with Changes/Refills   Current Medications    ASPIRIN (ECOTRIN) 81 MG EC TABLET    Take 1 tablet (81 mg total) by mouth once daily.    CALCIUM ORAL    Take 1 tablet by mouth once daily.    CYANOCOBALAMIN (VITAMIN B-12) 1000 MCG TABLET    Take 100 mcg by mouth once daily.      FLUTICASONE (VERAMYST) 27.5 MCG/ACTUATION NASAL SPRAY    2 sprays by Nasal route once daily.      MULTIVITAMIN/IRON/FOLIC ACID (CENTRUM COMPLETE ORAL)    Take 1  tablet by mouth once daily.    OMEGA-3 FATTY ACIDS 1,000 MG CAP    Take by mouth. 1 Capsule Oral Every day    VITAMIN D 185 MG TAB    Take 185 mg by mouth once daily.     Changed and/or Refilled Medications    Modified Medication Previous Medication    COLESTIPOL (COLESTID) 1 GRAM TAB colestipol (COLESTID) 1 gram Tab       Take 2 tablets (2 g total) by mouth 2 (two) times daily.    TAKE TWO TABLETS BY MOUTH TWICE DAILY    HYDROCHLOROTHIAZIDE (HYDRODIURIL) 25 MG TABLET hydroCHLOROthiazide (HYDRODIURIL) 25 MG tablet       Take 1 tablet (25 mg total) by mouth once daily.    TAKE ONE TABLET BY MOUTH EVERY DAY    LISINOPRIL (PRINIVIL,ZESTRIL) 40 MG TABLET lisinopril (PRINIVIL,ZESTRIL) 40 MG tablet       Take 1 tablet (40 mg total) by mouth once daily.    TAKE ONE TABLET BY MOUTH EVERY DAY   Discontinued Medications    CITALOPRAM (CELEXA) 10 MG TABLET    Take 1 tablet (10 mg total) by mouth once daily.         CARE TEAM:  Patient Care Team:  Nusrat Cruz MD as PCP - General (Internal Medicine)  Cindy Oscar LPN as Licensed Practical Nurse  Shira Barksdale OD (Ophthalmology)           SCREENING HISTORY:  Health Maintenance       Date Due Completion Date    TETANUS VACCINE 04/03/1958 ---    Zoster Vaccine 04/03/2000 ---    Mammogram 04/19/2019 4/19/2018    Eye Exam 04/24/2019 4/24/2018    Override on 3/18/2015: Done    Override on 3/4/2014: Done    Override on 1/28/2013: Done    Foot Exam 10/03/2019 10/3/2018    Urine Microalbumin 10/03/2019 10/3/2018    Hemoglobin A1c 10/09/2019 4/9/2019    Lipid Panel 04/09/2020 4/9/2019    DEXA SCAN 04/19/2020 4/19/2018    Override on 10/11/2011: Done (osteopenia)            REVIEW OF SYSTEMS:   The patient reports: good dietary habits.  The patient reports : that they do not exercise, but stay active.  Review of Systems   Constitutional: Positive for unexpected weight change (- she admits to being stressed and overeating which has resulted in some weight gain). Negative  "for activity change, chills, fatigue and fever.   HENT: Negative for congestion, hearing loss, postnasal drip, rhinorrhea and trouble swallowing.    Eyes: Negative for pain, discharge and visual disturbance.        She thinks her left eye cataract is getting worse and she has an appointment with the eye doctor in the near future for further evaluation.   Respiratory: Negative for cough, chest tightness, shortness of breath and wheezing.    Cardiovascular: Negative for chest pain, palpitations and leg swelling.   Gastrointestinal: Negative for abdominal pain, blood in stool, constipation, diarrhea, nausea and vomiting.   Endocrine: Negative for polydipsia and polyuria.   Genitourinary: Negative for difficulty urinating, dysuria, hematuria and menstrual problem.   Musculoskeletal: Negative for arthralgias, back pain, joint swelling and neck pain.   Skin: Negative for rash.   Neurological: Negative for weakness and headaches.   Psychiatric/Behavioral: Negative for confusion, dysphoric mood and sleep disturbance. The patient is not nervous/anxious.      Breast ROS: negative for breast lumps             Physical Examination:   Vitals:    04/17/19 1501   BP: 106/66   Pulse: 63   Temp: 97.8 °F (36.6 °C)     Weight: 66.5 kg (146 lb 9.7 oz)   Height: 5' 3" (160 cm)   Body mass index is 25.97 kg/m².      Patient did not require to have a chaperone present during the exam today.  General appearance - alert, well appearing, and in no distress and overweight  Mental status - alert, oriented to person, place, and time, normal mood, behavior, speech, dress, motor activity, and thought processes  Eyes - pupils equal and reactive, extraocular eye movements intact, sclera anicteric  Mouth - mucous membranes moist, pharynx normal without lesions  Neck - supple, no significant adenopathy, carotids upstroke normal bilaterally, no bruits, thyroid exam: thyroid is normal in size without nodules or tenderness  Lymphatics - no palpable " lymphadenopathy  Chest - clear to auscultation, no wheezes, rales or rhonchi, symmetric air entry  Heart - normal rate and regular rhythm, no gallops noted  Abdomen - soft, nontender, nondistended, no masses or organomegaly  Breasts - breasts appear normal, no suspicious masses, no skin or nipple changes or axillary nodes  Back exam - full range of motion, no tenderness, palpable spasm or pain on motion  Neurological - alert, oriented, normal speech, no focal findings or movement disorder noted, cranial nerves II through XII intact  Musculoskeletal - no joint tenderness, deformity or swelling, no muscular tenderness noted  Extremities - peripheral pulses normal, no pedal edema, no clubbing or cyanosis  Skin - normal coloration and turgor, no rashes, no suspicious skin lesions noted      Lab Results   Component Value Date    HGBA1C 6.0 (H) 04/09/2019    HGBA1C 5.9 (H) 09/28/2018    HGBA1C 5.8 (H) 03/28/2018      Lab Results   Component Value Date    CHOL 233 (H) 04/09/2019    CHOL 239 (H) 03/28/2018    CHOL 237 (H) 10/07/2017     Lab Results   Component Value Date    LDLCALC 146.8 04/09/2019    LDLCALC 157.4 03/28/2018    LDLCALC 151.8 10/07/2017          ASSESSMENT AND PLAN:  1. Routine medical exam  Counseled on age appropriate medical preventative services including age appropriate cancer screenings, age appropriate eye and dental exams, over all nutritional health, need for a consistent exercise regimen, and an over all push towards maintaining a vigorous and active lifestyle.  Counseled on age appropriate vaccines and discussed upcoming health care needs based on age/gender. Discussed good sleep hygiene and stress management.     2. Controlled type 2 diabetes mellitus without complication, without long-term current use of insulin  Diabetes currently is controlled for age and comorbid conditions. We discussed diabetic diet and regular exercise. We discussed home blood sugar monitoring, if appropriate. Continue  current medication regimen. and Recheck A1c in 6 months.  Diabetic complications addressed: Not applicable.  Patient was counseled on the need for yearly diabetic retinopathy exam and yearly diabetic foot exam.     3. Benign hypertension with chronic kidney disease, stage III  Discussed sodium restriction, maintaining ideal body weight and regular exercise program as physiologic means to achieve blood pressure control. The patient will strive towards this. The current medical regimen is effective;  continue present plan and medications. Recommended patient to check home readings to monitor and see me for followup as scheduled or sooner as needed. Patient was educated that both decongestant and anti-inflammatory medication may raise blood pressure.   She has been on lisinopril more than 5 years.  We briefly discussed switching to an ARB.  She will take this under consideration.  Her kidney function did decrease some recently.  I discussed with her to drink more water.  I will recheck in 1 month.  Consider referral to Nephrology if kidney function continues to decline.  - Basic metabolic panel; Future  - lisinopril (PRINIVIL,ZESTRIL) 40 MG tablet; Take 1 tablet (40 mg total) by mouth once daily.  Dispense: 90 tablet; Refill: 1  - hydroCHLOROthiazide (HYDRODIURIL) 25 MG tablet; Take 1 tablet (25 mg total) by mouth once daily.  Dispense: 90 tablet; Refill: 1    4. Hyperlipidemia LDL goal <100/5. Statin myopathy  We discussed low fat diet and regular exercise. Patient is statin intolerant.   - colestipol (COLESTID) 1 gram Tab; Take 2 tablets (2 g total) by mouth 2 (two) times daily.  Dispense: 360 tablet; Refill: 1    6. Osteopenia, unspecified location/7. Vitamin D deficiency disease  We discussed adequate calcium and vitamin D supplementation. We discussed fall precautions. She is up to date on her BMD. No need for prescription medication at this time     8. Alpha thalassemia trait  Stable. Asymptomatic. Observe.      9. Atherosclerosis of abdominal aorta  Patient with Atherosclerosis of the Aorta.  Stable/asymptomatic. Currently stable on lipid lowering medication and b/p monitoring.     10. Secondary hyperparathyroidism of renal origin  Stable. Asymptomatic. Observe.     11. Need for Tdap vaccination  Patient was advised to get immunization at the pharmacy.     12. Need for shingles vaccine  Patient was advised to get immunization at the pharmacy.     13. Advanced directives, counseling/discussion  Patient reports they have previously completed advance directives and they will bring paperwork from home at their earliest convenience so they can be scanned into their chart.           Follow up in about 6 months (around 10/17/2019), or if symptoms worsen or fail to improve, for follow up chronic medical conditions.. or sooner as needed.      6/7/2019 - recent message from patient that she is scheduled for cataract surgery on 06/25.  The patient has well-controlled diabetes and hypertension.  The patient is here by medically cleared for her cataract surgery.  Decision on whether or not she should hold her aspirin perioperatively deferred to eye doctor.

## 2019-04-22 ENCOUNTER — HOSPITAL ENCOUNTER (OUTPATIENT)
Dept: RADIOLOGY | Facility: HOSPITAL | Age: 79
Discharge: HOME | End: 2019-04-22
Attending: INTERNAL MEDICINE
Payer: MEDICARE

## 2019-04-22 VITALS — HEIGHT: 63 IN | WEIGHT: 146 LBS | BODY MASS INDEX: 25.87 KG/M2

## 2019-04-22 DIAGNOSIS — Z12.31 ENCOUNTER FOR SCREENING MAMMOGRAM FOR MALIGNANT NEOPLASM OF BREAST: ICD-10-CM

## 2019-04-22 PROCEDURE — 77067 SCR MAMMO BI INCL CAD: CPT | Mod: 26,,, | Performed by: RADIOLOGY

## 2019-04-22 PROCEDURE — 77063 MAMMO DIGITAL SCREENING BILAT WITH TOMOSYNTHESIS_CAD: ICD-10-PCS | Mod: 26,,, | Performed by: RADIOLOGY

## 2019-04-22 PROCEDURE — 77067 MAMMO DIGITAL SCREENING BILAT WITH TOMOSYNTHESIS_CAD: ICD-10-PCS | Mod: 26,,, | Performed by: RADIOLOGY

## 2019-04-22 PROCEDURE — 77067 SCR MAMMO BI INCL CAD: CPT | Mod: TC,PO

## 2019-04-22 PROCEDURE — 77063 BREAST TOMOSYNTHESIS BI: CPT | Mod: 26,,, | Performed by: RADIOLOGY

## 2019-04-24 LAB
LEFT EYE DM RETINOPATHY: NEGATIVE
RIGHT EYE DM RETINOPATHY: NEGATIVE

## 2019-05-13 ENCOUNTER — LAB VISIT (OUTPATIENT)
Dept: LAB | Facility: HOSPITAL | Age: 79
End: 2019-05-13
Attending: INTERNAL MEDICINE
Payer: MEDICARE

## 2019-05-13 DIAGNOSIS — N18.30 BENIGN HYPERTENSION WITH CHRONIC KIDNEY DISEASE, STAGE III: ICD-10-CM

## 2019-05-13 DIAGNOSIS — I12.9 BENIGN HYPERTENSION WITH CHRONIC KIDNEY DISEASE, STAGE III: ICD-10-CM

## 2019-05-13 LAB
ANION GAP SERPL CALC-SCNC: 9 MMOL/L (ref 8–16)
BUN SERPL-MCNC: 26 MG/DL (ref 8–23)
CALCIUM SERPL-MCNC: 9.6 MG/DL (ref 8.7–10.5)
CHLORIDE SERPL-SCNC: 104 MMOL/L (ref 95–110)
CO2 SERPL-SCNC: 26 MMOL/L (ref 23–29)
CREAT SERPL-MCNC: 1.6 MG/DL (ref 0.5–1.4)
EST. GFR  (AFRICAN AMERICAN): 35.1 ML/MIN/1.73 M^2
EST. GFR  (NON AFRICAN AMERICAN): 30.4 ML/MIN/1.73 M^2
GLUCOSE SERPL-MCNC: 159 MG/DL (ref 70–110)
POTASSIUM SERPL-SCNC: 4 MMOL/L (ref 3.5–5.1)
SODIUM SERPL-SCNC: 139 MMOL/L (ref 136–145)

## 2019-05-13 PROCEDURE — 80048 BASIC METABOLIC PNL TOTAL CA: CPT

## 2019-05-13 PROCEDURE — 36415 COLL VENOUS BLD VENIPUNCTURE: CPT | Mod: PO

## 2019-05-22 ENCOUNTER — TELEPHONE (OUTPATIENT)
Dept: ADMINISTRATIVE | Facility: HOSPITAL | Age: 79
End: 2019-05-22

## 2019-06-04 ENCOUNTER — TELEPHONE (OUTPATIENT)
Dept: FAMILY MEDICINE | Facility: CLINIC | Age: 79
End: 2019-06-04

## 2019-06-04 ENCOUNTER — PATIENT MESSAGE (OUTPATIENT)
Dept: FAMILY MEDICINE | Facility: CLINIC | Age: 79
End: 2019-06-04

## 2019-06-04 NOTE — TELEPHONE ENCOUNTER
Patient brought in paperwork for cataract surgery to be filled. Paperwork placed on Dr. Cruz's desk.

## 2019-06-05 ENCOUNTER — PATIENT MESSAGE (OUTPATIENT)
Dept: FAMILY MEDICINE | Facility: CLINIC | Age: 79
End: 2019-06-05

## 2019-07-09 ENCOUNTER — OFFICE VISIT (OUTPATIENT)
Dept: FAMILY MEDICINE | Facility: CLINIC | Age: 79
End: 2019-07-09
Payer: MEDICARE

## 2019-07-09 VITALS
OXYGEN SATURATION: 96 % | WEIGHT: 146.5 LBS | DIASTOLIC BLOOD PRESSURE: 60 MMHG | HEIGHT: 63 IN | TEMPERATURE: 99 F | HEART RATE: 52 BPM | SYSTOLIC BLOOD PRESSURE: 128 MMHG | BODY MASS INDEX: 25.96 KG/M2

## 2019-07-09 DIAGNOSIS — I70.0 ATHEROSCLEROSIS OF ABDOMINAL AORTA: ICD-10-CM

## 2019-07-09 DIAGNOSIS — G72.0 STATIN MYOPATHY: ICD-10-CM

## 2019-07-09 DIAGNOSIS — E11.9 CONTROLLED TYPE 2 DIABETES MELLITUS WITHOUT COMPLICATION, WITHOUT LONG-TERM CURRENT USE OF INSULIN: ICD-10-CM

## 2019-07-09 DIAGNOSIS — E55.9 VITAMIN D DEFICIENCY DISEASE: ICD-10-CM

## 2019-07-09 DIAGNOSIS — N25.81 SECONDARY HYPERPARATHYROIDISM OF RENAL ORIGIN: ICD-10-CM

## 2019-07-09 DIAGNOSIS — N18.30 BENIGN HYPERTENSION WITH CHRONIC KIDNEY DISEASE, STAGE III: ICD-10-CM

## 2019-07-09 DIAGNOSIS — I12.9 BENIGN HYPERTENSION WITH CHRONIC KIDNEY DISEASE, STAGE III: ICD-10-CM

## 2019-07-09 DIAGNOSIS — E78.5 HYPERLIPIDEMIA LDL GOAL <100: ICD-10-CM

## 2019-07-09 DIAGNOSIS — M85.80 OSTEOPENIA, UNSPECIFIED LOCATION: ICD-10-CM

## 2019-07-09 DIAGNOSIS — T46.6X5A STATIN MYOPATHY: ICD-10-CM

## 2019-07-09 DIAGNOSIS — D56.3 ALPHA THALASSEMIA TRAIT: Primary | ICD-10-CM

## 2019-07-09 PROCEDURE — 99999 PR PBB SHADOW E&M-EST. PATIENT-LVL IV: ICD-10-PCS | Mod: PBBFAC,,, | Performed by: NURSE PRACTITIONER

## 2019-07-09 PROCEDURE — 99214 OFFICE O/P EST MOD 30 MIN: CPT | Mod: S$PBB,,, | Performed by: NURSE PRACTITIONER

## 2019-07-09 PROCEDURE — 99999 PR PBB SHADOW E&M-EST. PATIENT-LVL IV: CPT | Mod: PBBFAC,,, | Performed by: NURSE PRACTITIONER

## 2019-07-09 PROCEDURE — 99214 PR OFFICE/OUTPT VISIT, EST, LEVL IV, 30-39 MIN: ICD-10-PCS | Mod: S$PBB,,, | Performed by: NURSE PRACTITIONER

## 2019-07-09 PROCEDURE — 99214 OFFICE O/P EST MOD 30 MIN: CPT | Mod: PBBFAC,PO | Performed by: NURSE PRACTITIONER

## 2019-07-09 NOTE — PROGRESS NOTES
Subjective:       Patient ID: Lizbeth Hutchison is a 79 y.o. female.    Chief Complaint: Pre-op Exam (Catract surgery left eye on 07/23/19)    79-year-old female presents to the clinic today for preop clearance for cataract surgery.  She denies any complaints today.  She denies any cardiac chest pain, heart palpitations, shortness breath, or swelling to lower extremities.  She denies any headaches or dizziness.  She does have chronic blurred vision in her left eye secondary to cataract.    Past Medical History:   Diagnosis Date    Alpha thalassemia trait     Anxiety     Atherosclerosis of abdominal aorta     noted on CT scan 4/6/2016    DDD (degenerative disc disease), lumbar     chronic low back pain    Diverticulosis     History of colonic polyps     last colonoscopy 2011 - normal    Hyperlipidemia LDL goal <100     unable to tolerate statins or Welchol    Hypertension     Osteopenia     no need for medication at this time - last BMD October 2010    Overweight     Type II or unspecified type diabetes mellitus without mention of complication, not stated as uncontrolled     Vitamin D deficiency disease     resolved with supplementation     Past Surgical History:   Procedure Laterality Date    [facility]    bilateral    HEMORRHOID SURGERY  1970    KELOID EXCISION  1974, 1976    abdominal wall    TOTAL ABDOMINAL HYSTERECTOMY  1972    TRIGGER FINGER RELEASE  2003    left hand      reports that she has never smoked. She has never used smokeless tobacco. She reports that she does not drink alcohol.  Review of Systems   Constitutional: Negative for chills and fever.   HENT: Negative for congestion, ear discharge, ear pain, postnasal drip, rhinorrhea, sinus pressure and sore throat.    Eyes: Negative for pain, discharge, redness and itching.   Respiratory: Negative for cough, shortness of breath and wheezing.    Cardiovascular: Negative for chest pain, palpitations and leg swelling.    Gastrointestinal: Negative for abdominal pain, constipation, diarrhea, nausea and vomiting.   Genitourinary: Negative for dysuria and frequency.   Musculoskeletal: Negative for gait problem, neck pain and neck stiffness.   Skin: Negative for rash.   Neurological: Negative for dizziness, light-headedness and headaches.       Objective:      Physical Exam   Constitutional: She is oriented to person, place, and time. She appears well-developed and well-nourished. No distress.   HENT:   Head: Normocephalic and atraumatic.   Right Ear: External ear normal.   Left Ear: External ear normal.   Nose: Nose normal.   Mouth/Throat: Oropharynx is clear and moist.   Eyes: Pupils are equal, round, and reactive to light. Conjunctivae and EOM are normal. Right eye exhibits no discharge. Left eye exhibits no discharge. No scleral icterus.   Neck: Normal range of motion. Neck supple. No JVD present. No thyromegaly present.   Cardiovascular: Normal rate and regular rhythm. Exam reveals no friction rub.   No murmur heard.  Pulmonary/Chest: Effort normal and breath sounds normal. No respiratory distress. She has no wheezes. She has no rales.   Abdominal: Soft. Bowel sounds are normal. There is no tenderness. There is no rebound and no guarding.   Musculoskeletal: Normal range of motion. She exhibits no edema.   Lymphadenopathy:     She has no cervical adenopathy.   Neurological: She is alert and oriented to person, place, and time. She displays normal reflexes.   Skin: Skin is warm and dry. No rash noted. She is not diaphoretic.   Psychiatric: She has a normal mood and affect. Her behavior is normal. Judgment and thought content normal.       Assessment:       1. Alpha thalassemia trait    2. Atherosclerosis of abdominal aorta    3. Benign hypertension with chronic kidney disease, stage III    4. Controlled type 2 diabetes mellitus without complication, without long-term current use of insulin    5. Hyperlipidemia LDL goal <100    6.  Osteopenia, unspecified location    7. Secondary hyperparathyroidism of renal origin    8. Statin myopathy    9. Vitamin D deficiency disease        Plan:         Alpha thalassemia trait  - stable observe asymptomatic    Atherosclerosis of abdominal aorta  - stable asymptomatic on blood pressure medication and cholesterol monitoring unable to tolerate statins    Benign hypertension with chronic kidney disease, stage III  - The current medical regimen is effective;  continue present plan and medications.  The the  Controlled type 2 diabetes mellitus without complication, without long-term current use of insulin  - diet controlled     Hyperlipidemia LDL goal <100  - unable to tolerate statins    Osteopenia, unspecified location  - on calcium and vitamin d    Secondary hyperparathyroidism of renal origin  - stable observe asymptomatic     Statin myopathy  - unable to tolerate statins    Vitamin D deficiency disease  - continue vitamin d

## 2019-07-26 ENCOUNTER — PES CALL (OUTPATIENT)
Dept: ADMINISTRATIVE | Facility: CLINIC | Age: 79
End: 2019-07-26

## 2019-08-12 DIAGNOSIS — I12.9 BENIGN HYPERTENSION WITH CHRONIC KIDNEY DISEASE, STAGE III: ICD-10-CM

## 2019-08-12 DIAGNOSIS — N18.30 BENIGN HYPERTENSION WITH CHRONIC KIDNEY DISEASE, STAGE III: ICD-10-CM

## 2019-08-12 RX ORDER — HYDROCHLOROTHIAZIDE 25 MG/1
TABLET ORAL
Qty: 90 TABLET | Refills: 0 | Status: SHIPPED | OUTPATIENT
Start: 2019-08-12 | End: 2019-10-28 | Stop reason: SDUPTHER

## 2019-08-12 RX ORDER — LISINOPRIL 40 MG/1
TABLET ORAL
Qty: 90 TABLET | Refills: 0 | Status: SHIPPED | OUTPATIENT
Start: 2019-08-12 | End: 2019-10-28 | Stop reason: SDUPTHER

## 2019-10-14 ENCOUNTER — PATIENT OUTREACH (OUTPATIENT)
Dept: ADMINISTRATIVE | Facility: HOSPITAL | Age: 79
End: 2019-10-14

## 2019-10-14 DIAGNOSIS — E11.9 CONTROLLED TYPE 2 DIABETES MELLITUS WITHOUT COMPLICATION, WITHOUT LONG-TERM CURRENT USE OF INSULIN: Primary | ICD-10-CM

## 2019-10-22 ENCOUNTER — LAB VISIT (OUTPATIENT)
Dept: LAB | Facility: HOSPITAL | Age: 79
End: 2019-10-22
Attending: INTERNAL MEDICINE
Payer: MEDICARE

## 2019-10-22 DIAGNOSIS — E11.9 CONTROLLED TYPE 2 DIABETES MELLITUS WITHOUT COMPLICATION, WITHOUT LONG-TERM CURRENT USE OF INSULIN: ICD-10-CM

## 2019-10-22 LAB
ESTIMATED AVG GLUCOSE: 134 MG/DL (ref 68–131)
HBA1C MFR BLD HPLC: 6.3 % (ref 4–5.6)

## 2019-10-22 PROCEDURE — 36415 COLL VENOUS BLD VENIPUNCTURE: CPT | Mod: PO

## 2019-10-22 PROCEDURE — 83036 HEMOGLOBIN GLYCOSYLATED A1C: CPT

## 2019-10-28 ENCOUNTER — OFFICE VISIT (OUTPATIENT)
Dept: FAMILY MEDICINE | Facility: CLINIC | Age: 79
End: 2019-10-28
Payer: MEDICARE

## 2019-10-28 VITALS
HEIGHT: 63 IN | WEIGHT: 149.13 LBS | DIASTOLIC BLOOD PRESSURE: 56 MMHG | SYSTOLIC BLOOD PRESSURE: 136 MMHG | HEART RATE: 57 BPM | TEMPERATURE: 98 F | BODY MASS INDEX: 26.42 KG/M2 | OXYGEN SATURATION: 99 %

## 2019-10-28 DIAGNOSIS — E78.5 HYPERLIPIDEMIA LDL GOAL <100: ICD-10-CM

## 2019-10-28 DIAGNOSIS — I70.0 ATHEROSCLEROSIS OF ABDOMINAL AORTA: ICD-10-CM

## 2019-10-28 DIAGNOSIS — N18.30 BENIGN HYPERTENSION WITH CHRONIC KIDNEY DISEASE, STAGE III: ICD-10-CM

## 2019-10-28 DIAGNOSIS — Z71.89 ADVANCED DIRECTIVES, COUNSELING/DISCUSSION: ICD-10-CM

## 2019-10-28 DIAGNOSIS — Z23 NEED FOR TDAP VACCINATION: ICD-10-CM

## 2019-10-28 DIAGNOSIS — D56.3 ALPHA THALASSEMIA TRAIT: ICD-10-CM

## 2019-10-28 DIAGNOSIS — M67.40 GANGLION CYST: ICD-10-CM

## 2019-10-28 DIAGNOSIS — N25.81 SECONDARY HYPERPARATHYROIDISM OF RENAL ORIGIN: ICD-10-CM

## 2019-10-28 DIAGNOSIS — E66.3 OVERWEIGHT (BMI 25.0-29.9): ICD-10-CM

## 2019-10-28 DIAGNOSIS — R15.9 FECAL SOILING DUE TO FECAL INCONTINENCE: ICD-10-CM

## 2019-10-28 DIAGNOSIS — T46.6X5A STATIN MYOPATHY: ICD-10-CM

## 2019-10-28 DIAGNOSIS — G72.0 STATIN MYOPATHY: ICD-10-CM

## 2019-10-28 DIAGNOSIS — Z23 NEED FOR SHINGLES VACCINE: ICD-10-CM

## 2019-10-28 DIAGNOSIS — E11.9 CONTROLLED TYPE 2 DIABETES MELLITUS WITHOUT COMPLICATION, WITHOUT LONG-TERM CURRENT USE OF INSULIN: Primary | ICD-10-CM

## 2019-10-28 DIAGNOSIS — M79.622 LEFT UPPER ARM PAIN: ICD-10-CM

## 2019-10-28 DIAGNOSIS — I12.9 BENIGN HYPERTENSION WITH CHRONIC KIDNEY DISEASE, STAGE III: ICD-10-CM

## 2019-10-28 PROCEDURE — 99213 OFFICE O/P EST LOW 20 MIN: CPT | Mod: PBBFAC,PO | Performed by: INTERNAL MEDICINE

## 2019-10-28 PROCEDURE — 99999 PR PBB SHADOW E&M-EST. PATIENT-LVL III: ICD-10-PCS | Mod: PBBFAC,,, | Performed by: INTERNAL MEDICINE

## 2019-10-28 PROCEDURE — 99214 OFFICE O/P EST MOD 30 MIN: CPT | Mod: S$PBB,,, | Performed by: INTERNAL MEDICINE

## 2019-10-28 PROCEDURE — 99214 PR OFFICE/OUTPT VISIT, EST, LEVL IV, 30-39 MIN: ICD-10-PCS | Mod: S$PBB,,, | Performed by: INTERNAL MEDICINE

## 2019-10-28 PROCEDURE — 99999 PR PBB SHADOW E&M-EST. PATIENT-LVL III: CPT | Mod: PBBFAC,,, | Performed by: INTERNAL MEDICINE

## 2019-10-28 RX ORDER — HYDROCHLOROTHIAZIDE 25 MG/1
25 TABLET ORAL DAILY
Qty: 90 TABLET | Refills: 1 | Status: SHIPPED | OUTPATIENT
Start: 2019-10-28 | End: 2020-05-01 | Stop reason: SDUPTHER

## 2019-10-28 RX ORDER — MONTELUKAST SODIUM 4 MG/1
2 TABLET, CHEWABLE ORAL 2 TIMES DAILY
Qty: 360 TABLET | Refills: 1 | Status: SHIPPED | OUTPATIENT
Start: 2019-10-28 | End: 2020-05-01 | Stop reason: SDUPTHER

## 2019-10-28 RX ORDER — LISINOPRIL 40 MG/1
40 TABLET ORAL DAILY
Qty: 90 TABLET | Refills: 1 | Status: SHIPPED | OUTPATIENT
Start: 2019-10-28 | End: 2020-05-01 | Stop reason: SDUPTHER

## 2019-10-28 NOTE — PROGRESS NOTES
HISTORY OF PRESENT ILLNESS:  Lizbeth Hutchison is a 79 y.o. female who presents to the clinic today for Hypertension; Hyperlipidemia; and Diabetes  .   The patient presents to clinic today for follow-up of her type 2 diabetes, hypertension, and hyperlipidemia.  She states blood pressures are well controlled at home.  She does not check her blood sugars. The patient denies any problems with cardiac chest pain, dizziness, palpitations, orthopnea, or lower extremity edema. The patient reports compliance with current medication- patient denies missing any  medication doses.  She states she had her flu shot given in her left arm on 09/10.  She states that it hurt when she got the shot.  She states since then she has had shooting pains down her arm.  She also developed a small round spot on her elbow which was initially warm, red, and swollen.  The area looks a little bit better.  She is concerned about possible infection.  She wants to know what to do about treatment.        PAST MEDICAL HISTORY:  Past Medical History:   Diagnosis Date    Alpha thalassemia trait     Anxiety     Atherosclerosis of abdominal aorta     noted on CT scan 4/6/2016    DDD (degenerative disc disease), lumbar     chronic low back pain    Diverticulosis     History of colonic polyps     last colonoscopy 2011 - normal    Hyperlipidemia LDL goal <100     unable to tolerate statins or Welchol    Hypertension     Osteopenia     no need for medication at this time - last BMD October 2010    Overweight     Type II or unspecified type diabetes mellitus without mention of complication, not stated as uncontrolled     Vitamin D deficiency disease     resolved with supplementation       PAST SURGICAL HISTORY:  Past Surgical History:   Procedure Laterality Date    CATARACT EXTRACTION Bilateral 6/25/19 8/23/19    samuel  1994    bilateral    HEMORRHOID SURGERY  1970    KELOID EXCISION  1974, 1976    abdominal wall    TOTAL ABDOMINAL  HYSTERECTOMY  1972    TRIGGER FINGER RELEASE  2003    left hand       SOCIAL HISTORY:  Social History     Socioeconomic History    Marital status:      Spouse name: Not on file    Number of children: 2    Years of education: Not on file    Highest education level: Not on file   Occupational History    Occupation:    Social Needs    Financial resource strain: Not on file    Food insecurity:     Worry: Not on file     Inability: Not on file    Transportation needs:     Medical: Not on file     Non-medical: Not on file   Tobacco Use    Smoking status: Never Smoker    Smokeless tobacco: Never Used   Substance and Sexual Activity    Alcohol use: No    Drug use: Not on file    Sexual activity: Not on file   Lifestyle    Physical activity:     Days per week: Not on file     Minutes per session: Not on file    Stress: Not on file   Relationships    Social connections:     Talks on phone: Not on file     Gets together: Not on file     Attends Caodaism service: Not on file     Active member of club or organization: Not on file     Attends meetings of clubs or organizations: Not on file     Relationship status: Not on file   Other Topics Concern    Not on file   Social History Narrative    Not on file       FAMILY HISTORY:  Family History   Adopted: Yes   Problem Relation Age of Onset    Heart failure Mother     Alzheimer's disease Mother     Breast cancer Maternal Grandmother     Other Sister          from too much anesthesia    Congenital heart disease Brother     Arthritis Sister         back problems    Hypertension Sister     Transient ischemic attack Sister     Stroke Brother     Coronary artery disease Brother         s/p stenting    Stroke Brother     Colon cancer Other     Other Son         had eosinophilic granulomatosis -  shortly after lung transplant    Diabetes Neg Hx        ALLERGIES AND MEDICATIONS: updated and reviewed.  Review of patient's  allergies indicates:   Allergen Reactions    Cholesterol analogues Other (See Comments)     Muscle spasam    Clindamycin Hives    Statins-hmg-coa reductase inhibitors Other (See Comments)    Sulfa (sulfonamide antibiotics)     Tramadol Nausea And Vomiting    Welchol [colesevelam] Other (See Comments)    Codeine Rash    Penicillins Rash     Medication List with Changes/Refills   Current Medications    ASPIRIN (ECOTRIN) 81 MG EC TABLET    Take 1 tablet (81 mg total) by mouth once daily.    CALCIUM ORAL    Take 1 tablet by mouth once daily.    CYANOCOBALAMIN (VITAMIN B-12) 1000 MCG TABLET    Take 100 mcg by mouth once daily.      FLUTICASONE (VERAMYST) 27.5 MCG/ACTUATION NASAL SPRAY    2 sprays by Nasal route once daily.      LIDOCAINE HCL 2% 2 % JELP WITH CYCLOPENTOLATE 2 % DROP 5 DROP, TROPICAMIDE 1% 1 % DROP 5 DROP, PHENYLEPHRINE HCL 10% 10 % DROP 5 DROP    AS DIRECTED    MULTIVITAMIN/IRON/FOLIC ACID (CENTRUM COMPLETE ORAL)    Take 1 tablet by mouth once daily.    OMEGA-3 FATTY ACIDS 1,000 MG CAP    Take by mouth. 1 Capsule Oral Every day    VITAMIN D 185 MG TAB    Take 185 mg by mouth once daily.     Changed and/or Refilled Medications    Modified Medication Previous Medication    COLESTIPOL (COLESTID) 1 GRAM TAB colestipol (COLESTID) 1 gram Tab       Take 2 tablets (2 g total) by mouth 2 (two) times daily.    Take 2 tablets (2 g total) by mouth 2 (two) times daily.    HYDROCHLOROTHIAZIDE (HYDRODIURIL) 25 MG TABLET hydroCHLOROthiazide (HYDRODIURIL) 25 MG tablet       Take 1 tablet (25 mg total) by mouth once daily.    TAKE ONE TABLET BY MOUTH once DAILY    LISINOPRIL (PRINIVIL,ZESTRIL) 40 MG TABLET lisinopril (PRINIVIL,ZESTRIL) 40 MG tablet       Take 1 tablet (40 mg total) by mouth once daily.    TAKE ONE TABLET BY MOUTH once DAILY   Discontinued Medications    FLUZONE HIGH-DOSE 2019-20, PF, 180 MCG/0.5 ML SYRG    ADM 0.5ML IM UTD          CARE TEAM:  Patient Care Team:  Nusrat Cruz MD as PCP -  "General (Internal Medicine)  Cindy Oscar LPN as Licensed Practical Nurse  Shira Barksdale OD (Ophthalmology)         REVIEW OF SYSTEMS:  Review of Systems   Constitutional: Negative for chills, fatigue, fever and unexpected weight change.   HENT: Negative for congestion and postnasal drip.    Eyes: Negative for pain and visual disturbance.   Respiratory: Negative for cough, shortness of breath and wheezing.    Cardiovascular: Negative for chest pain, palpitations and leg swelling.   Gastrointestinal: Negative for abdominal pain, constipation, diarrhea, nausea and vomiting.        She reports that since she had her last colonoscopy she has occasional very minimal fecal incontinence.   Genitourinary: Negative for dysuria.   Musculoskeletal: Positive for arthralgias (- see HPI). Negative for back pain.   Skin: Negative for rash.   Neurological: Negative for weakness and headaches.   Psychiatric/Behavioral: Negative for dysphoric mood and sleep disturbance. The patient is not nervous/anxious.          PHYSICAL EXAM:   Vitals:    10/28/19 0849   BP: (!) 136/56   Pulse: (!) 57   Temp: 97.8 °F (36.6 °C)     Weight: 67.7 kg (149 lb 2.3 oz)   Height: 5' 3" (160 cm)   Body mass index is 26.42 kg/m².      General appearance - alert, well appearing, and in no distress, overweight  Psychiatric - alert, oriented to person, place, and time, normal mood, behavior, speech, dress, motor activity, and thought processes  Eyes - pupils equal and reactive, extraocular eye movements intact, sclera anicteric  Ears - bilateral TM's and external ear canals normal  Mouth - mucous membranes moist, pharynx normal without lesions  Neck - supple, no significant adenopathy, carotids upstroke normal bilaterally, no bruits, thyroid exam: thyroid is normal in size without nodules or tenderness  Lymphatics - no palpable cervical lymphadenopathy  Chest - clear to auscultation, no wheezes, rales or rhonchi, symmetric air entry  Heart - normal " rate and regular rhythm, no gallops noted  Neurological - alert, normal speech, no focal findings or movement disorder noted, cranial nerves II through XII intact  Musculoskeletal - patient noted to have Mild-Moderate osteoarthritic changes to both knee joints. No joint effusions noted., no muscular tenderness noted, normal exam of left UE except small mobile mildly tender non erythematous nodule at base of left elbow  Rectal - deferred  Extremities - peripheral pulses normal, no pedal edema, no clubbing or cyanosis  Skin - normal coloration and turgor, no rashes, no suspicious skin lesions noted  Diabetic Foot Exam -   Protective Sensation (w/ 10 gram monofilament):  Right: Intact  Left: Intact    Visual Inspection:  Normal -  Bilateral    Pedal Pulses:   Right: Present  Left: Present    Posterior tibialis:   Right:Present  Left: Present         Labs:  Lab Results   Component Value Date    HGBA1C 6.3 (H) 10/22/2019    HGBA1C 6.0 (H) 04/09/2019    HGBA1C 5.9 (H) 09/28/2018      Lab Results   Component Value Date    CHOL 233 (H) 04/09/2019    CHOL 239 (H) 03/28/2018    CHOL 237 (H) 10/07/2017     Lab Results   Component Value Date    LDLCALC 146.8 04/09/2019    LDLCALC 157.4 03/28/2018    LDLCALC 151.8 10/07/2017           ASSESSMENT AND PLAN:  1. Controlled type 2 diabetes mellitus without complication, without long-term current use of insulin  Diabetes is under good control control at this time for age and comorbid conditions. We discussed diabetic diet and regular exercise. We discussed home blood sugar monitoring, if appropriate - the patient does not need to test daily but can test only as needed. We discussed low sugar/low carbohydrate diet and regular exercise to prevent progression. No need for prescription medication at this time. and Recheck A1c in 6 months.  Diabetic complications addressed: Not applicable.  Patient was counseled on the need for yearly eye exam to screen for/monitor diabetic retinopathy  and yearly diabetic foot exam.     2. Benign hypertension with chronic kidney disease, stage III  Discussed sodium restriction, maintaining ideal body weight and regular exercise program as physiologic means to achieve blood pressure control. The patient will strive towards this. The current medical regimen is effective;  continue present plan and medications. Recommended patient to check home readings to monitor and see me for followup as scheduled or sooner as needed. Patient was educated that both decongestant and anti-inflammatory medication may raise blood pressure.   - lisinopril (PRINIVIL,ZESTRIL) 40 MG tablet; Take 1 tablet (40 mg total) by mouth once daily.  Dispense: 90 tablet; Refill: 1  - hydroCHLOROthiazide (HYDRODIURIL) 25 MG tablet; Take 1 tablet (25 mg total) by mouth once daily.  Dispense: 90 tablet; Refill: 1    3. Hyperlipidemia LDL goal <100/4. Statin myopathy  We discussed low fat diet and regular exercise. Patient is statin intolerant.   - colestipol (COLESTID) 1 gram Tab; Take 2 tablets (2 g total) by mouth 2 (two) times daily.  Dispense: 360 tablet; Refill: 1    5. Secondary hyperparathyroidism of renal origin  Stable. Asymptomatic. Observe.     6. Alpha thalassemia trait  Stable. Asymptomatic. Observe.     7. Atherosclerosis of abdominal aorta  Patient with Atherosclerosis of the Aorta.  Stable/asymptomatic. Currently stable on lipid and b/p monitoring. Statin intolerant.    8. Overweight (BMI 25.0-29.9)  The patient is asked to make an attempt to improve diet and exercise patterns to aid in medical management of this problem.     9. Need for shingles vaccine  Patient was advised to get immunization at the pharmacy.     10. Need for Tdap vaccination  Patient was advised to get immunization at the pharmacy.     11. Left upper arm pain  Patient reassured with normal examination.  We discussed icing/heat and Tylenol as needed for pain.  Consider orthopedic consultation if symptoms worsen or  persist.    12. Ganglion cyst  Patient reassured.  Handout given with further information.  Consider orthopedic consultation if symptoms worsen or persist.  Observe for now.    13. Fecal soiling due to fecal incontinence  She will start with adding a fiber supplement daily.  If this does not improve her symptoms she will see a colon/rectal provider for further evaluation.  I told her that there are many possible etiologies for her symptoms.    14. Advanced directives, counseling/discussion  Advance Care Planning   I initiated the process and explained the importance of advance care planning today with the patient.  Advanced directives were discussed due to patient's age and/or chronic medical conditions. Prognosis based on current condition: good.   She will bring paperwork from home that was previously completed regarding living will (at this point in time, the patient does have full decision-making capacity.  We discussed different extreme health states that she could experience, and reviewed what kind of medical care she would want in those situations) and medical POA (The patient received detailed information about the importance of designating a Health Care Power of . She was also instructed to communicate with this person about their wishes for future healthcare, should she become sick and lose decision-making capacity).   [place] was not discussed.   Approximately 3 minute(s) were spent on counseling/discussion regarding end of life decision making.                   Follow up in about 6 months (around 4/28/2020) for follow up chronic medical conditions.. or sooner as needed.

## 2020-04-09 ENCOUNTER — PATIENT MESSAGE (OUTPATIENT)
Dept: ADMINISTRATIVE | Facility: HOSPITAL | Age: 80
End: 2020-04-09

## 2020-04-09 ENCOUNTER — PATIENT MESSAGE (OUTPATIENT)
Dept: FAMILY MEDICINE | Facility: CLINIC | Age: 80
End: 2020-04-09

## 2020-04-09 DIAGNOSIS — Z12.31 ENCOUNTER FOR SCREENING MAMMOGRAM FOR BREAST CANCER: ICD-10-CM

## 2020-04-09 DIAGNOSIS — E78.5 HYPERLIPIDEMIA LDL GOAL <100: Primary | ICD-10-CM

## 2020-04-09 DIAGNOSIS — E11.9 CONTROLLED TYPE 2 DIABETES MELLITUS WITHOUT COMPLICATION, WITHOUT LONG-TERM CURRENT USE OF INSULIN: ICD-10-CM

## 2020-04-09 DIAGNOSIS — N18.30 BENIGN HYPERTENSION WITH CHRONIC KIDNEY DISEASE, STAGE III: ICD-10-CM

## 2020-04-09 DIAGNOSIS — I12.9 BENIGN HYPERTENSION WITH CHRONIC KIDNEY DISEASE, STAGE III: ICD-10-CM

## 2020-04-13 NOTE — TELEPHONE ENCOUNTER
Order signed.  Please schedule.  Okay to do now.      I have placed an order for her mammogram.  This will be available for her to schedule from once the books open up again.  She can start trying in May.

## 2020-04-13 NOTE — TELEPHONE ENCOUNTER
Spoke with pt she wants to know if she can have her labs done this month. Pt also received letter stating she's due for mammogram states she called to schedule and was informed that no mammograms are being scheduled until May.Please add or delete any labs other labs.Please advise if it's ok to schedule.

## 2020-04-28 ENCOUNTER — LAB VISIT (OUTPATIENT)
Dept: LAB | Facility: HOSPITAL | Age: 80
End: 2020-04-28
Attending: INTERNAL MEDICINE
Payer: MEDICARE

## 2020-04-28 DIAGNOSIS — E78.5 HYPERLIPIDEMIA LDL GOAL <100: ICD-10-CM

## 2020-04-28 DIAGNOSIS — N18.30 BENIGN HYPERTENSION WITH CHRONIC KIDNEY DISEASE, STAGE III: ICD-10-CM

## 2020-04-28 DIAGNOSIS — I12.9 BENIGN HYPERTENSION WITH CHRONIC KIDNEY DISEASE, STAGE III: ICD-10-CM

## 2020-04-28 DIAGNOSIS — E11.9 CONTROLLED TYPE 2 DIABETES MELLITUS WITHOUT COMPLICATION, WITHOUT LONG-TERM CURRENT USE OF INSULIN: ICD-10-CM

## 2020-04-28 LAB
ALBUMIN SERPL BCP-MCNC: 3.9 G/DL (ref 3.5–5.2)
ALP SERPL-CCNC: 71 U/L (ref 55–135)
ALT SERPL W/O P-5'-P-CCNC: 13 U/L (ref 10–44)
ANION GAP SERPL CALC-SCNC: 9 MMOL/L (ref 8–16)
AST SERPL-CCNC: 14 U/L (ref 10–40)
BASOPHILS # BLD AUTO: 0.07 K/UL (ref 0–0.2)
BASOPHILS NFR BLD: 1.1 % (ref 0–1.9)
BILIRUB SERPL-MCNC: 0.6 MG/DL (ref 0.1–1)
BUN SERPL-MCNC: 17 MG/DL (ref 8–23)
CALCIUM SERPL-MCNC: 9.7 MG/DL (ref 8.7–10.5)
CHLORIDE SERPL-SCNC: 103 MMOL/L (ref 95–110)
CHOLEST SERPL-MCNC: 228 MG/DL (ref 120–199)
CHOLEST/HDLC SERPL: 6.3 {RATIO} (ref 2–5)
CO2 SERPL-SCNC: 30 MMOL/L (ref 23–29)
CREAT SERPL-MCNC: 1.5 MG/DL (ref 0.5–1.4)
DIFFERENTIAL METHOD: ABNORMAL
EOSINOPHIL # BLD AUTO: 0.2 K/UL (ref 0–0.5)
EOSINOPHIL NFR BLD: 2.4 % (ref 0–8)
ERYTHROCYTE [DISTWIDTH] IN BLOOD BY AUTOMATED COUNT: 16.5 % (ref 11.5–14.5)
EST. GFR  (AFRICAN AMERICAN): 37.7 ML/MIN/1.73 M^2
EST. GFR  (NON AFRICAN AMERICAN): 32.7 ML/MIN/1.73 M^2
ESTIMATED AVG GLUCOSE: 134 MG/DL (ref 68–131)
GLUCOSE SERPL-MCNC: 90 MG/DL (ref 70–110)
HBA1C MFR BLD HPLC: 6.3 % (ref 4–5.6)
HCT VFR BLD AUTO: 48 % (ref 37–48.5)
HDLC SERPL-MCNC: 36 MG/DL (ref 40–75)
HDLC SERPL: 15.8 % (ref 20–50)
HGB BLD-MCNC: 13.4 G/DL (ref 12–16)
IMM GRANULOCYTES # BLD AUTO: 0.03 K/UL (ref 0–0.04)
IMM GRANULOCYTES NFR BLD AUTO: 0.5 % (ref 0–0.5)
LDLC SERPL CALC-MCNC: 140.2 MG/DL (ref 63–159)
LYMPHOCYTES # BLD AUTO: 2.6 K/UL (ref 1–4.8)
LYMPHOCYTES NFR BLD: 38.7 % (ref 18–48)
MCH RBC QN AUTO: 22.1 PG (ref 27–31)
MCHC RBC AUTO-ENTMCNC: 27.9 G/DL (ref 32–36)
MCV RBC AUTO: 79 FL (ref 82–98)
MONOCYTES # BLD AUTO: 0.5 K/UL (ref 0.3–1)
MONOCYTES NFR BLD: 8.2 % (ref 4–15)
NEUTROPHILS # BLD AUTO: 3.3 K/UL (ref 1.8–7.7)
NEUTROPHILS NFR BLD: 49.1 % (ref 38–73)
NONHDLC SERPL-MCNC: 192 MG/DL
NRBC BLD-RTO: 0 /100 WBC
PLATELET # BLD AUTO: 185 K/UL (ref 150–350)
PMV BLD AUTO: 13.5 FL (ref 9.2–12.9)
POTASSIUM SERPL-SCNC: 4.3 MMOL/L (ref 3.5–5.1)
PROT SERPL-MCNC: 7.7 G/DL (ref 6–8.4)
RBC # BLD AUTO: 6.05 M/UL (ref 4–5.4)
SODIUM SERPL-SCNC: 142 MMOL/L (ref 136–145)
TRIGL SERPL-MCNC: 259 MG/DL (ref 30–150)
WBC # BLD AUTO: 6.62 K/UL (ref 3.9–12.7)

## 2020-04-28 PROCEDURE — 83036 HEMOGLOBIN GLYCOSYLATED A1C: CPT

## 2020-04-28 PROCEDURE — 80061 LIPID PANEL: CPT

## 2020-04-28 PROCEDURE — 36415 COLL VENOUS BLD VENIPUNCTURE: CPT | Mod: PO

## 2020-04-28 PROCEDURE — 80053 COMPREHEN METABOLIC PANEL: CPT

## 2020-04-28 PROCEDURE — 85025 COMPLETE CBC W/AUTO DIFF WBC: CPT

## 2020-05-01 ENCOUNTER — OFFICE VISIT (OUTPATIENT)
Dept: FAMILY MEDICINE | Facility: CLINIC | Age: 80
End: 2020-05-01
Payer: MEDICARE

## 2020-05-01 DIAGNOSIS — E66.3 OVERWEIGHT (BMI 25.0-29.9): ICD-10-CM

## 2020-05-01 DIAGNOSIS — I70.0 ATHEROSCLEROSIS OF ABDOMINAL AORTA: ICD-10-CM

## 2020-05-01 DIAGNOSIS — M85.80 OSTEOPENIA AFTER MENOPAUSE: ICD-10-CM

## 2020-05-01 DIAGNOSIS — E11.9 CONTROLLED TYPE 2 DIABETES MELLITUS WITHOUT COMPLICATION, WITHOUT LONG-TERM CURRENT USE OF INSULIN: Primary | ICD-10-CM

## 2020-05-01 DIAGNOSIS — Z23 NEED FOR TDAP VACCINATION: ICD-10-CM

## 2020-05-01 DIAGNOSIS — E78.5 HYPERLIPIDEMIA LDL GOAL <100: ICD-10-CM

## 2020-05-01 DIAGNOSIS — T46.6X5A STATIN MYOPATHY: ICD-10-CM

## 2020-05-01 DIAGNOSIS — G72.0 STATIN MYOPATHY: ICD-10-CM

## 2020-05-01 DIAGNOSIS — I12.9 BENIGN HYPERTENSION WITH CHRONIC KIDNEY DISEASE, STAGE III: ICD-10-CM

## 2020-05-01 DIAGNOSIS — D56.3 ALPHA THALASSEMIA TRAIT: ICD-10-CM

## 2020-05-01 DIAGNOSIS — N18.30 BENIGN HYPERTENSION WITH CHRONIC KIDNEY DISEASE, STAGE III: ICD-10-CM

## 2020-05-01 DIAGNOSIS — Z23 NEED FOR SHINGLES VACCINE: ICD-10-CM

## 2020-05-01 DIAGNOSIS — N25.81 SECONDARY HYPERPARATHYROIDISM OF RENAL ORIGIN: ICD-10-CM

## 2020-05-01 DIAGNOSIS — Z78.0 OSTEOPENIA AFTER MENOPAUSE: ICD-10-CM

## 2020-05-01 PROCEDURE — 99214 PR OFFICE/OUTPT VISIT, EST, LEVL IV, 30-39 MIN: ICD-10-PCS | Mod: 95,,, | Performed by: INTERNAL MEDICINE

## 2020-05-01 PROCEDURE — 99214 OFFICE O/P EST MOD 30 MIN: CPT | Mod: 95,,, | Performed by: INTERNAL MEDICINE

## 2020-05-01 RX ORDER — HYDROCHLOROTHIAZIDE 25 MG/1
25 TABLET ORAL DAILY
Qty: 90 TABLET | Refills: 1 | Status: SHIPPED | OUTPATIENT
Start: 2020-05-01 | End: 2020-12-02 | Stop reason: SDUPTHER

## 2020-05-01 RX ORDER — MONTELUKAST SODIUM 4 MG/1
2 TABLET, CHEWABLE ORAL 2 TIMES DAILY
Qty: 360 TABLET | Refills: 1 | Status: SHIPPED | OUTPATIENT
Start: 2020-05-01 | End: 2020-12-02 | Stop reason: SDUPTHER

## 2020-05-01 RX ORDER — LISINOPRIL 40 MG/1
40 TABLET ORAL DAILY
Qty: 90 TABLET | Refills: 1 | Status: SHIPPED | OUTPATIENT
Start: 2020-05-01 | End: 2020-12-02 | Stop reason: SDUPTHER

## 2020-05-01 NOTE — PROGRESS NOTES
The patient location is: Home.  The chief complaint leading to consultation is: Follow-up (diabetes, HTN, HPL)     Visit type: Virtual visit with synchronous audio and video  Total time spent with patient: 8 minutes.  Each patient to whom he or she provides medical services by telemedicine is:  (1) informed of the relationship between the physician and patient and the respective role of any other health care provider with respect to management of the patient; and (2) notified that he or she may decline to receive medical services by telemedicine and may withdraw from such care at any time.      HISTORY OF PRESENT ILLNESS:  Lizbeth Hutchison is a 80 y.o. female who presents to the clinic today for Follow-up (diabetes, HTN, HPL)  .   The patient was seen today for follow-up of her type 2 diabetes mellitus, hypertension complicated by chronic kidney disease stage 3, and hyperlipidemia.  She currently does not have a blood pressure machine to check her blood pressures at home.  She also does not check blood sugars at home. The patient denies any problems with cardiac chest pain, dizziness, palpitations, orthopnea, or lower extremity edema. The patient reports compliance with current medication- patient denies missing any  medication doses.  She recently did blood work and would like to discuss results.  She states that she did routine COVID-19 testing at the [facility] yesterday.  She is asymptomatic.        PAST MEDICAL HISTORY:  Past Medical History:   Diagnosis Date    Alpha thalassemia trait     Anxiety     Atherosclerosis of abdominal aorta     noted on CT scan 4/6/2016    DDD (degenerative disc disease), lumbar     chronic low back pain    Diverticulosis     History of colonic polyps     last colonoscopy 2011 - normal    Hyperlipidemia LDL goal <100     unable to tolerate statins or Welchol    Hypertension     Osteopenia     no need for medication at this time - last BMD October 2010     Overweight     Type II or unspecified type diabetes mellitus without mention of complication, not stated as uncontrolled     Vitamin D deficiency disease     resolved with supplementation       PAST SURGICAL HISTORY:  Past Surgical History:   Procedure Laterality Date    CATARACT EXTRACTION Bilateral 19    samuel      bilateral    HEMORRHOID SURGERY  1970    KELOID EXCISION  ,     abdominal wall    TOTAL ABDOMINAL HYSTERECTOMY      TRIGGER FINGER RELEASE      left hand       SOCIAL HISTORY:  Social History     Socioeconomic History    Marital status:      Spouse name: Not on file    Number of children: 2    Years of education: Not on file    Highest education level: Not on file   Occupational History    Occupation:    Social Needs    Financial resource strain: Not on file    Food insecurity:     Worry: Not on file     Inability: Not on file    Transportation needs:     Medical: Not on file     Non-medical: Not on file   Tobacco Use    Smoking status: Never Smoker    Smokeless tobacco: Never Used   Substance and Sexual Activity    Alcohol use: No    Drug use: Not on file    Sexual activity: Not on file   Lifestyle    Physical activity:     Days per week: Not on file     Minutes per session: Not on file    Stress: Not on file   Relationships    Social connections:     Talks on phone: Not on file     Gets together: Not on file     Attends Nondenominational service: Not on file     Active member of club or organization: Not on file     Attends meetings of clubs or organizations: Not on file     Relationship status: Not on file   Other Topics Concern    Not on file   Social History Narrative    Not on file       FAMILY HISTORY:  Family History   Adopted: Yes   Problem Relation Age of Onset    Heart failure Mother     Alzheimer's disease Mother     Breast cancer Maternal Grandmother     Other Sister          from too much anesthesia     Congenital heart disease Brother     Arthritis Sister         back problems    Hypertension Sister     Transient ischemic attack Sister     Stroke Brother     Coronary artery disease Brother         s/p stenting    Stroke Brother     Colon cancer Other     Other Son         had eosinophilic granulomatosis -  shortly after lung transplant    Diabetes Neg Hx        ALLERGIES AND MEDICATIONS: updated and reviewed.  Review of patient's allergies indicates:   Allergen Reactions    Cholesterol analogues Other (See Comments)     Muscle spasam    Clindamycin Hives    Statins-hmg-coa reductase inhibitors Other (See Comments)    Sulfa (sulfonamide antibiotics)     Tramadol Nausea And Vomiting    Welchol [colesevelam] Other (See Comments)    Codeine Rash    Penicillins Rash     Medication List with Changes/Refills   Current Medications    ASPIRIN (ECOTRIN) 81 MG EC TABLET    Take 1 tablet (81 mg total) by mouth once daily.    CALCIUM ORAL    Take 1 tablet by mouth once daily.    CYANOCOBALAMIN (VITAMIN B-12) 1000 MCG TABLET    Take 100 mcg by mouth once daily.      FLUTICASONE (VERAMYST) 27.5 MCG/ACTUATION NASAL SPRAY    2 sprays by Nasal route once daily.      LIDOCAINE HCL 2% 2 % JELP WITH CYCLOPENTOLATE 2 % DROP 5 DROP, TROPICAMIDE 1% 1 % DROP 5 DROP, PHENYLEPHRINE HCL 10% 10 % DROP 5 DROP    AS DIRECTED    MULTIVITAMIN/IRON/FOLIC ACID (CENTRUM COMPLETE ORAL)    Take 1 tablet by mouth once daily.    OMEGA-3 FATTY ACIDS 1,000 MG CAP    Take by mouth. 1 Capsule Oral Every day    VITAMIN D 185 MG TAB    Take 185 mg by mouth once daily.     Changed and/or Refilled Medications    Modified Medication Previous Medication    COLESTIPOL (COLESTID) 1 GRAM TAB colestipol (COLESTID) 1 gram Tab       Take 2 tablets (2 g total) by mouth 2 (two) times daily.    Take 2 tablets (2 g total) by mouth 2 (two) times daily.    HYDROCHLOROTHIAZIDE (HYDRODIURIL) 25 MG TABLET hydroCHLOROthiazide (HYDRODIURIL) 25 MG tablet        Take 1 tablet (25 mg total) by mouth once daily.    Take 1 tablet (25 mg total) by mouth once daily.    LISINOPRIL (PRINIVIL,ZESTRIL) 40 MG TABLET lisinopril (PRINIVIL,ZESTRIL) 40 MG tablet       Take 1 tablet (40 mg total) by mouth once daily.    Take 1 tablet (40 mg total) by mouth once daily.         CARE TEAM:  Patient Care Team:  Nusrat Cruz MD as PCP - General (Internal Medicine)  [facility]us[facility] MD [facility]  Cindy Oscar LPN as Licensed Practical Nurse  Shira Barksdale OD (Ophthalmology)         REVIEW OF SYSTEMS:  Review of Systems   Constitutional: Negative for chills, fatigue, fever and unexpected weight change.   HENT: Negative for congestion and postnasal drip.    Eyes: Negative for pain and visual disturbance.   Respiratory: Negative for cough, shortness of breath and wheezing.    Cardiovascular: Negative for chest pain, palpitations and leg swelling.   Gastrointestinal: Negative for abdominal pain, constipation, diarrhea, nausea and vomiting.   Genitourinary: Negative for dysuria.   Musculoskeletal: Negative for arthralgias and back pain.   Skin: Negative for rash.   Neurological: Negative for weakness and headaches.   Psychiatric/Behavioral: Negative for dysphoric mood and sleep disturbance. The patient is not nervous/anxious.          PHYSICAL EXAM:   There were no vitals filed for this visit.          There is no height or weight on file to calculate BMI.      General appearance - alert, well appearing, and in no distress  Psychiatric - alert, oriented to person, place, and time, normal mood, behavior, speech, dress, motor activity, and thought processes  Unable to perform remainder of PE as this was a video visit      Labs:  Lab Results   Component Value Date    HGBA1C 6.3 (H) 04/28/2020    HGBA1C 6.3 (H) 10/22/2019    HGBA1C 6.0 (H) 04/09/2019      Lab Results   Component Value Date    CHOL 228 (H) 04/28/2020    CHOL 233 (H) 04/09/2019    CHOL 239 (H)  03/28/2018     Lab Results   Component Value Date    LDLCALC 140.2 04/28/2020    LDLCALC 146.8 04/09/2019    LDLCALC 157.4 03/28/2018         ASSESSMENT AND PLAN:  1. Controlled type 2 diabetes mellitus without complication, without long-term current use of insulin  Diabetes is under good control control at this time for age and comorbid conditions. We discussed diabetic diet and regular exercise. We discussed home blood sugar monitoring, if appropriate - the patient does not need to test daily but can test only as needed. We discussed low sugar/low carbohydrate diet and regular exercise to prevent progression. No need for prescription medication at this time. Recheck A1c in 6 months.  Diabetic complications addressed: Not applicable.  Patient was counseled on the need for yearly eye exam to screen for/monitor diabetic retinopathy and yearly diabetic foot exam.    2. Benign hypertension with chronic kidney disease, stage III  Discussed sodium restriction, maintaining ideal body weight and regular exercise program as physiologic means to achieve blood pressure control. The patient will strive towards this.   The current medical regimen is effective;  continue present plan and medications. Recommended patient to check home readings to monitor and see me for followup as scheduled or sooner as needed.   Patient was educated that both decongestant and anti-inflammatory medication may raise blood pressure.  The patient is not interested in the digital hypertension program.  - lisinopriL (PRINIVIL,ZESTRIL) 40 MG tablet; Take 1 tablet (40 mg total) by mouth once daily.  Dispense: 90 tablet; Refill: 1  - hydroCHLOROthiazide (HYDRODIURIL) 25 MG tablet; Take 1 tablet (25 mg total) by mouth once daily.  Dispense: 90 tablet; Refill: 1    3. Hyperlipidemia LDL goal <100/4.  Statin myopathy  We discussed low fat diet and regular exercise. Patient is statin intolerant.  - colestipoL (COLESTID) 1 gram Tab; Take 2 tablets (2 g total)  by mouth 2 (two) times daily.  Dispense: 360 tablet; Refill: 1    5. Atherosclerosis of abdominal aorta  Patient with Atherosclerosis of the Aorta.  Stable/asymptomatic. Currently stable on lipid lowering medication and b/p monitoring.    6. Alpha thalassemia trait  Stable. Asymptomatic. Observe.    7. Secondary hyperparathyroidism of renal origin  Stable. Asymptomatic. Observe.    8. Osteopenia after menopause  We discussed adequate calcium and vitamin D supplementation. We discussed fall precautions. She is up to date on her BMD. No need for prescription medication at this time    9. Overweight (BMI 25.0-29.9)  The patient is asked to make an attempt to improve diet and exercise patterns to aid in medical management of this problem.    10. Need for shingles vaccine  Patient was advised to get immunization at the pharmacy.    11. Need for Tdap vaccination  Patient was advised to get immunization at the pharmacy.         No orders of the defined types were placed in this encounter.     Follow up in about 6 months (around 11/1/2020), or if symptoms worsen or fail to improve, for annual exam. or sooner as needed.

## 2020-06-16 ENCOUNTER — HOSPITAL ENCOUNTER (OUTPATIENT)
Dept: RADIOLOGY | Facility: HOSPITAL | Age: 80
Discharge: HOME | End: 2020-06-16
Attending: INTERNAL MEDICINE
Payer: MEDICARE

## 2020-06-16 DIAGNOSIS — Z12.31 ENCOUNTER FOR SCREENING MAMMOGRAM FOR BREAST CANCER: ICD-10-CM

## 2020-06-16 PROCEDURE — 77067 MAMMO DIGITAL SCREENING BILAT WITH CAD: ICD-10-PCS | Mod: 26,,, | Performed by: RADIOLOGY

## 2020-06-16 PROCEDURE — 77067 SCR MAMMO BI INCL CAD: CPT | Mod: TC,PO

## 2020-06-16 PROCEDURE — 77067 SCR MAMMO BI INCL CAD: CPT | Mod: 26,,, | Performed by: RADIOLOGY

## 2020-08-03 ENCOUNTER — PATIENT MESSAGE (OUTPATIENT)
Dept: FAMILY MEDICINE | Facility: CLINIC | Age: 80
End: 2020-08-03

## 2020-08-03 NOTE — TELEPHONE ENCOUNTER
Spoke with patient advised form was faxed. Patient states she will come in the morning to pick the form up. Informed patient letter will be at the . Patient verbalized understanding.

## 2020-10-01 ENCOUNTER — PATIENT MESSAGE (OUTPATIENT)
Dept: OTHER | Facility: OTHER | Age: 80
End: 2020-10-01

## 2020-11-27 ENCOUNTER — PATIENT MESSAGE (OUTPATIENT)
Dept: FAMILY MEDICINE | Facility: CLINIC | Age: 80
End: 2020-11-27

## 2020-11-27 ENCOUNTER — TELEPHONE (OUTPATIENT)
Dept: FAMILY MEDICINE | Facility: CLINIC | Age: 80
End: 2020-11-27

## 2020-11-27 DIAGNOSIS — E11.9 CONTROLLED TYPE 2 DIABETES MELLITUS WITHOUT COMPLICATION, WITHOUT LONG-TERM CURRENT USE OF INSULIN: Primary | ICD-10-CM

## 2020-11-27 DIAGNOSIS — N18.30 BENIGN HYPERTENSION WITH CHRONIC KIDNEY DISEASE, STAGE III: ICD-10-CM

## 2020-11-27 DIAGNOSIS — I12.9 BENIGN HYPERTENSION WITH CHRONIC KIDNEY DISEASE, STAGE III: ICD-10-CM

## 2020-11-27 NOTE — TELEPHONE ENCOUNTER
----- Message from Cony Meyers sent at 11/27/2020 11:46 AM CST -----  Regarding: Orders  Contact: PATIENT  Type: Patient Call Back    Who called: Patient    What is the request in detail: She is requesting lab orders. Please advise.    Can the clinic reply by [facility]? No    Would the patient rather a call back or a response via [facility]? Call    Best call back number: [phone] ([place])  or [phone]    Additional Information:    Thanks

## 2020-11-28 ENCOUNTER — LAB VISIT (OUTPATIENT)
Dept: LAB | Facility: HOSPITAL | Age: 80
End: 2020-11-28
Attending: NURSE PRACTITIONER
Payer: MEDICARE

## 2020-11-28 DIAGNOSIS — N18.30 BENIGN HYPERTENSION WITH CHRONIC KIDNEY DISEASE, STAGE III: ICD-10-CM

## 2020-11-28 DIAGNOSIS — E11.9 CONTROLLED TYPE 2 DIABETES MELLITUS WITHOUT COMPLICATION, WITHOUT LONG-TERM CURRENT USE OF INSULIN: ICD-10-CM

## 2020-11-28 DIAGNOSIS — I12.9 BENIGN HYPERTENSION WITH CHRONIC KIDNEY DISEASE, STAGE III: ICD-10-CM

## 2020-11-28 LAB
ALBUMIN SERPL BCP-MCNC: 4 G/DL (ref 3.5–5.2)
ALP SERPL-CCNC: 67 U/L (ref 55–135)
ALT SERPL W/O P-5'-P-CCNC: 11 U/L (ref 10–44)
ANION GAP SERPL CALC-SCNC: 9 MMOL/L (ref 8–16)
AST SERPL-CCNC: 16 U/L (ref 10–40)
BASOPHILS # BLD AUTO: 0.06 K/UL (ref 0–0.2)
BASOPHILS NFR BLD: 0.9 % (ref 0–1.9)
BILIRUB SERPL-MCNC: 0.7 MG/DL (ref 0.1–1)
BUN SERPL-MCNC: 26 MG/DL (ref 8–23)
CALCIUM SERPL-MCNC: 9.3 MG/DL (ref 8.7–10.5)
CHLORIDE SERPL-SCNC: 105 MMOL/L (ref 95–110)
CHOLEST SERPL-MCNC: 242 MG/DL (ref 120–199)
CHOLEST/HDLC SERPL: 5.9 {RATIO} (ref 2–5)
CO2 SERPL-SCNC: 28 MMOL/L (ref 23–29)
CREAT SERPL-MCNC: 1.7 MG/DL (ref 0.5–1.4)
DIFFERENTIAL METHOD: ABNORMAL
EOSINOPHIL # BLD AUTO: 0.2 K/UL (ref 0–0.5)
EOSINOPHIL NFR BLD: 2.7 % (ref 0–8)
ERYTHROCYTE [DISTWIDTH] IN BLOOD BY AUTOMATED COUNT: 15.9 % (ref 11.5–14.5)
EST. GFR  (AFRICAN AMERICAN): 32.4 ML/MIN/1.73 M^2
EST. GFR  (NON AFRICAN AMERICAN): 28.1 ML/MIN/1.73 M^2
ESTIMATED AVG GLUCOSE: 126 MG/DL (ref 68–131)
GLUCOSE SERPL-MCNC: 77 MG/DL (ref 70–110)
HBA1C MFR BLD HPLC: 6 % (ref 4–5.6)
HCT VFR BLD AUTO: 44.1 % (ref 37–48.5)
HDLC SERPL-MCNC: 41 MG/DL (ref 40–75)
HDLC SERPL: 16.9 % (ref 20–50)
HGB BLD-MCNC: 12.4 G/DL (ref 12–16)
IMM GRANULOCYTES # BLD AUTO: 0.02 K/UL (ref 0–0.04)
IMM GRANULOCYTES NFR BLD AUTO: 0.3 % (ref 0–0.5)
LDLC SERPL CALC-MCNC: 162.6 MG/DL (ref 63–159)
LYMPHOCYTES # BLD AUTO: 2.7 K/UL (ref 1–4.8)
LYMPHOCYTES NFR BLD: 40.3 % (ref 18–48)
MCH RBC QN AUTO: 22.3 PG (ref 27–31)
MCHC RBC AUTO-ENTMCNC: 28.1 G/DL (ref 32–36)
MCV RBC AUTO: 79 FL (ref 82–98)
MONOCYTES # BLD AUTO: 0.5 K/UL (ref 0.3–1)
MONOCYTES NFR BLD: 8.2 % (ref 4–15)
NEUTROPHILS # BLD AUTO: 3.1 K/UL (ref 1.8–7.7)
NEUTROPHILS NFR BLD: 47.6 % (ref 38–73)
NONHDLC SERPL-MCNC: 201 MG/DL
NRBC BLD-RTO: 0 /100 WBC
PLATELET # BLD AUTO: 197 K/UL (ref 150–350)
PMV BLD AUTO: 14 FL (ref 9.2–12.9)
POTASSIUM SERPL-SCNC: 3.9 MMOL/L (ref 3.5–5.1)
PROT SERPL-MCNC: 7.5 G/DL (ref 6–8.4)
RBC # BLD AUTO: 5.57 M/UL (ref 4–5.4)
SODIUM SERPL-SCNC: 142 MMOL/L (ref 136–145)
TRIGL SERPL-MCNC: 192 MG/DL (ref 30–150)
WBC # BLD AUTO: 6.57 K/UL (ref 3.9–12.7)

## 2020-11-28 PROCEDURE — 80053 COMPREHEN METABOLIC PANEL: CPT

## 2020-11-28 PROCEDURE — 85025 COMPLETE CBC W/AUTO DIFF WBC: CPT

## 2020-11-28 PROCEDURE — 83036 HEMOGLOBIN GLYCOSYLATED A1C: CPT

## 2020-11-28 PROCEDURE — 36415 COLL VENOUS BLD VENIPUNCTURE: CPT | Mod: PO

## 2020-11-28 PROCEDURE — 80061 LIPID PANEL: CPT

## 2020-12-02 ENCOUNTER — OFFICE VISIT (OUTPATIENT)
Dept: FAMILY MEDICINE | Facility: CLINIC | Age: 80
End: 2020-12-02
Payer: MEDICARE

## 2020-12-02 VITALS
HEIGHT: 63 IN | WEIGHT: 141 LBS | DIASTOLIC BLOOD PRESSURE: 54 MMHG | BODY MASS INDEX: 24.98 KG/M2 | TEMPERATURE: 98 F | OXYGEN SATURATION: 97 % | SYSTOLIC BLOOD PRESSURE: 106 MMHG | HEART RATE: 52 BPM

## 2020-12-02 DIAGNOSIS — D56.3 ALPHA THALASSEMIA TRAIT: ICD-10-CM

## 2020-12-02 DIAGNOSIS — M85.80 OSTEOPENIA, UNSPECIFIED LOCATION: ICD-10-CM

## 2020-12-02 DIAGNOSIS — E55.9 VITAMIN D DEFICIENCY DISEASE: ICD-10-CM

## 2020-12-02 DIAGNOSIS — N18.30 BENIGN HYPERTENSION WITH CHRONIC KIDNEY DISEASE, STAGE III: ICD-10-CM

## 2020-12-02 DIAGNOSIS — Z23 NEED FOR IMMUNIZATION AGAINST INFLUENZA: ICD-10-CM

## 2020-12-02 DIAGNOSIS — E11.9 CONTROLLED TYPE 2 DIABETES MELLITUS WITHOUT COMPLICATION, WITHOUT LONG-TERM CURRENT USE OF INSULIN: Primary | ICD-10-CM

## 2020-12-02 DIAGNOSIS — N25.81 SECONDARY HYPERPARATHYROIDISM OF RENAL ORIGIN: ICD-10-CM

## 2020-12-02 DIAGNOSIS — T46.6X5A STATIN MYOPATHY: ICD-10-CM

## 2020-12-02 DIAGNOSIS — E28.39 MENOPAUSE OVARIAN FAILURE: ICD-10-CM

## 2020-12-02 DIAGNOSIS — E66.3 OVERWEIGHT (BMI 25.0-29.9): ICD-10-CM

## 2020-12-02 DIAGNOSIS — I12.9 BENIGN HYPERTENSION WITH CHRONIC KIDNEY DISEASE, STAGE III: ICD-10-CM

## 2020-12-02 DIAGNOSIS — G72.0 STATIN MYOPATHY: ICD-10-CM

## 2020-12-02 DIAGNOSIS — I70.0 ATHEROSCLEROSIS OF ABDOMINAL AORTA: ICD-10-CM

## 2020-12-02 DIAGNOSIS — E78.5 HYPERLIPIDEMIA LDL GOAL <100: ICD-10-CM

## 2020-12-02 PROCEDURE — 99999 PR PBB SHADOW E&M-EST. PATIENT-LVL V: ICD-10-PCS | Mod: PBBFAC,,, | Performed by: NURSE PRACTITIONER

## 2020-12-02 PROCEDURE — 99214 PR OFFICE/OUTPT VISIT, EST, LEVL IV, 30-39 MIN: ICD-10-PCS | Mod: S$PBB,,, | Performed by: NURSE PRACTITIONER

## 2020-12-02 PROCEDURE — G0008 ADMIN INFLUENZA VIRUS VAC: HCPCS | Mod: PBBFAC,PO

## 2020-12-02 PROCEDURE — 90694 VACC AIIV4 NO PRSRV 0.5ML IM: CPT | Mod: PBBFAC,PO

## 2020-12-02 PROCEDURE — 99215 OFFICE O/P EST HI 40 MIN: CPT | Mod: PBBFAC,PO | Performed by: NURSE PRACTITIONER

## 2020-12-02 PROCEDURE — 82043 UR ALBUMIN QUANTITATIVE: CPT

## 2020-12-02 PROCEDURE — 99214 OFFICE O/P EST MOD 30 MIN: CPT | Mod: S$PBB,,, | Performed by: NURSE PRACTITIONER

## 2020-12-02 PROCEDURE — 99999 PR PBB SHADOW E&M-EST. PATIENT-LVL V: CPT | Mod: PBBFAC,,, | Performed by: NURSE PRACTITIONER

## 2020-12-02 RX ORDER — LISINOPRIL 40 MG/1
40 TABLET ORAL DAILY
Qty: 90 TABLET | Refills: 1 | Status: SHIPPED | OUTPATIENT
Start: 2020-12-02 | End: 2021-05-24

## 2020-12-02 RX ORDER — HYDROCHLOROTHIAZIDE 25 MG/1
25 TABLET ORAL DAILY
Qty: 90 TABLET | Refills: 1 | Status: SHIPPED | OUTPATIENT
Start: 2020-12-02 | End: 2021-05-24

## 2020-12-02 RX ORDER — MONTELUKAST SODIUM 4 MG/1
2 TABLET, CHEWABLE ORAL 2 TIMES DAILY
Qty: 360 TABLET | Refills: 1 | Status: SHIPPED | OUTPATIENT
Start: 2020-12-02 | End: 2021-07-09 | Stop reason: SDUPTHER

## 2020-12-02 NOTE — PATIENT INSTRUCTIONS
Check urine microalbuminuria   Scheduled dexa bone scan  Flu shot today  Refuses shingles shot   Schedule tetanus shot on nursing scheduled   Follow up with Dr. Cruz 5/28/2021

## 2020-12-02 NOTE — PROGRESS NOTES
Subjective:       Patient ID: Lizbeth Hutchison is a 80 y.o. female.    Chief Complaint: Diabetes and Flu Vaccine    80-year-old female presents to the clinic today for follow-up on diabetes.  She has poor dietary habits. She walks around the house but does not get any other exercise.  She is compliant with all of her medications.  She denies any headaches, dizziness, or blurred vision.  She denies any cardiac chest pain, heart palpitations, shortness breath, or swelling to lower extremities.  I did discuss her lab results with her.  I explained to her that her CBC was stable.  I explained to her that her diabetes was well controlled with a hemoglobin A1c of 6.0.  I explained to her that her cholesterol was elevated but she was unable to tolerate any statins.  I explained to her that she had chronic kidney disease and she needed to avoid all anti-inflammatories and stay well hydrated.  She states she takes no anti-inflammatories.  She would like a flu shot today.  She would like to schedule a tetanus shot with the nurse.  She refuses shingles vaccine at this time.  I will have the nurse schedule DEXA scan.  She also needs a urine microalbumin today.    Past Medical History:   Diagnosis Date    Alpha thalassemia trait     Anxiety     Atherosclerosis of abdominal aorta     noted on CT scan 4/6/2016    DDD (degenerative disc disease), lumbar     chronic low back pain    Diverticulosis     History of colonic polyps     last colonoscopy 2011 - normal    Hyperlipidemia LDL goal <100     unable to tolerate statins or Welchol    Hypertension     Osteopenia     no need for medication at this time - last BMD October 2010    Overweight     Type II or unspecified type diabetes mellitus without mention of complication, not stated as uncontrolled     Vitamin D deficiency disease     resolved with supplementation     Past Surgical History:   Procedure Laterality Date    CATARACT EXTRACTION Bilateral 6/25/19  8/23/19    samuel  1994    bilateral    HEMORRHOID SURGERY  1970    KELOID EXCISION  1974, 1976    abdominal wall    TOTAL ABDOMINAL HYSTERECTOMY  1972    TRIGGER FINGER RELEASE  2003    left hand      reports that she has never smoked. She has never used smokeless tobacco. She reports that she does not drink alcohol.  Review of Systems   Respiratory: Negative for cough, shortness of breath and wheezing.    Cardiovascular: Negative for chest pain, palpitations and leg swelling.   Gastrointestinal: Negative for abdominal pain, diarrhea, nausea and vomiting.   Musculoskeletal: Negative for gait problem.   Neurological: Negative for dizziness, light-headedness and headaches.       Objective:      Physical Exam  Constitutional:       General: She is not in acute distress.     Appearance: Normal appearance. She is normal weight. She is not ill-appearing, toxic-appearing or diaphoretic.   Neck:      Musculoskeletal: Normal range of motion and neck supple.      Vascular: No carotid bruit.   Cardiovascular:      Rate and Rhythm: Regular rhythm.      Heart sounds: Normal heart sounds. No murmur.   Pulmonary:      Effort: Pulmonary effort is normal.      Breath sounds: Normal breath sounds. No wheezing or rhonchi.   Abdominal:      General: Bowel sounds are normal.      Palpations: Abdomen is soft.      Tenderness: There is no abdominal tenderness.   Musculoskeletal: Normal range of motion.         General: No swelling.      Comments: Protective Sensation (w/ 10 gram monofilament):  Right: Intact  Left: Intact    Visual Inspection:  Normal bilaterally     Pedal Pulses:   Right: Present  Left: Present    Posterior tibialis:   Right:Present  Left: Present     Skin:     General: Skin is dry.   Neurological:      Mental Status: She is alert and oriented to person, place, and time.   Psychiatric:         Mood and Affect: Mood normal.         Behavior: Behavior normal.         Thought Content: Thought content normal.          Judgment: Judgment normal.         Assessment:       1. Controlled type 2 diabetes mellitus without complication, without long-term current use of insulin    2. Benign hypertension with chronic kidney disease, stage III    3. Hyperlipidemia LDL goal <100    4. Statin myopathy    5. Atherosclerosis of abdominal aorta    6. Alpha thalassemia trait    7. Secondary hyperparathyroidism of renal origin    8. Overweight (BMI 25.0-29.9)    9. Vitamin D deficiency disease    10. Osteopenia, unspecified location    11. Menopause ovarian failure    12. Need for immunization against influenza        Plan:         Controlled type 2 diabetes mellitus without complication, without long-term current use of insulin  -     Microalbumin/Creatinine Ratio, Urine  - diet controlled     Benign hypertension with chronic kidney disease, stage III  -     hydroCHLOROthiazide (HYDRODIURIL) 25 MG tablet; Take 1 tablet (25 mg total) by mouth once daily.  Dispense: 90 tablet; Refill: 1  -     lisinopriL (PRINIVIL,ZESTRIL) 40 MG tablet; Take 1 tablet (40 mg total) by mouth once daily.  Dispense: 90 tablet; Refill: 1  - The current medical regimen is effective;  continue present plan and medications.  - avoid all anti-inflammatories and stay well hydrated     Hyperlipidemia LDL goal <100  -     colestipoL (COLESTID) 1 gram Tab; Take 2 tablets (2 g total) by mouth 2 (two) times daily.  Dispense: 360 tablet; Refill: 1  - unable to tolerate Statins     Statin myopathy  - causes muscle pain     Atherosclerosis of abdominal aorta  - Stable / Asymptomatic is on blood pressure medication and unable to tolerate statins due to mypoathy    Alpha thalassemia trait  - stable continue to monitor     Secondary hyperparathyroidism of renal origin  - stable, observe, asymptomatic     Overweight (BMI 25.0-29.9)  - The patient is asked to make an attempt to improve diet and exercise patterns to aid in medical management of this problem.    Vitamin D deficiency  disease  - The current medical regimen is effective;  continue present plan and medications.    Osteopenia, unspecified location  - continue Calcium and vit d    Menopause ovarian failure  -     DXA Bone Density Spine And Hip; Future; Expected date: 12/02/2020    Need for immunization against influenza    Other orders  -     Influenza (FLUAD) - Quadrivalent (Adjuvanted) *Preferred* (65+) (PF)

## 2020-12-03 LAB
ALBUMIN/CREAT UR: 3.8 UG/MG (ref 0–30)
CREAT UR-MCNC: 133 MG/DL (ref 15–325)
MICROALBUMIN UR DL<=1MG/L-MCNC: 5 UG/ML

## 2020-12-11 ENCOUNTER — PATIENT MESSAGE (OUTPATIENT)
Dept: OTHER | Facility: OTHER | Age: 80
End: 2020-12-11

## 2021-01-12 LAB
LEFT EYE DM RETINOPATHY: NEGATIVE
RIGHT EYE DM RETINOPATHY: NEGATIVE

## 2021-01-18 ENCOUNTER — PATIENT MESSAGE (OUTPATIENT)
Dept: FAMILY MEDICINE | Facility: CLINIC | Age: 81
End: 2021-01-18

## 2021-01-19 ENCOUNTER — TELEPHONE (OUTPATIENT)
Dept: FAMILY MEDICINE | Facility: CLINIC | Age: 81
End: 2021-01-19

## 2021-01-19 ENCOUNTER — HOSPITAL ENCOUNTER (OUTPATIENT)
Dept: RADIOLOGY | Facility: CLINIC | Age: 81
Discharge: HOME | End: 2021-01-19
Attending: NURSE PRACTITIONER
Payer: MEDICARE

## 2021-01-19 DIAGNOSIS — E28.39 MENOPAUSE OVARIAN FAILURE: ICD-10-CM

## 2021-01-19 PROCEDURE — 77080 DXA BONE DENSITY AXIAL: CPT | Mod: 26,,, | Performed by: INTERNAL MEDICINE

## 2021-01-19 PROCEDURE — 77080 DEXA BONE DENSITY SPINE HIP: ICD-10-PCS | Mod: 26,,, | Performed by: INTERNAL MEDICINE

## 2021-01-19 PROCEDURE — 77080 DXA BONE DENSITY AXIAL: CPT | Mod: TC,PO

## 2021-01-29 ENCOUNTER — PATIENT OUTREACH (OUTPATIENT)
Dept: ADMINISTRATIVE | Facility: HOSPITAL | Age: 81
End: 2021-01-29

## 2021-02-09 ENCOUNTER — PATIENT MESSAGE (OUTPATIENT)
Dept: FAMILY MEDICINE | Facility: CLINIC | Age: 81
End: 2021-02-09

## 2021-02-11 ENCOUNTER — PATIENT MESSAGE (OUTPATIENT)
Dept: FAMILY MEDICINE | Facility: CLINIC | Age: 81
End: 2021-02-11

## 2021-04-16 ENCOUNTER — PES CALL (OUTPATIENT)
Dept: ADMINISTRATIVE | Facility: CLINIC | Age: 81
End: 2021-04-16

## 2021-04-30 ENCOUNTER — EXTERNAL CHRONIC CARE MANAGEMENT (OUTPATIENT)
Dept: PRIMARY CARE CLINIC | Facility: CLINIC | Age: 81
End: 2021-04-30
Payer: MEDICARE

## 2021-04-30 PROCEDURE — 99490 PR CHRONIC CARE MGMT, 1ST 20 MIN: ICD-10-PCS | Mod: S$PBB,,, | Performed by: INTERNAL MEDICINE

## 2021-04-30 PROCEDURE — 99490 CHRNC CARE MGMT STAFF 1ST 20: CPT | Mod: PBBFAC,PO | Performed by: INTERNAL MEDICINE

## 2021-04-30 PROCEDURE — 99490 CHRNC CARE MGMT STAFF 1ST 20: CPT | Mod: S$PBB,,, | Performed by: INTERNAL MEDICINE

## 2021-05-13 DIAGNOSIS — E11.9 CONTROLLED TYPE 2 DIABETES MELLITUS WITHOUT COMPLICATION, WITHOUT LONG-TERM CURRENT USE OF INSULIN: Primary | ICD-10-CM

## 2021-05-31 ENCOUNTER — EXTERNAL CHRONIC CARE MANAGEMENT (OUTPATIENT)
Dept: PRIMARY CARE CLINIC | Facility: CLINIC | Age: 81
End: 2021-05-31
Payer: MEDICARE

## 2021-05-31 PROCEDURE — 99490 PR CHRONIC CARE MGMT, 1ST 20 MIN: ICD-10-PCS | Mod: S$PBB,,, | Performed by: INTERNAL MEDICINE

## 2021-05-31 PROCEDURE — 99490 CHRNC CARE MGMT STAFF 1ST 20: CPT | Mod: S$PBB,,, | Performed by: INTERNAL MEDICINE

## 2021-05-31 PROCEDURE — 99490 CHRNC CARE MGMT STAFF 1ST 20: CPT | Mod: PBBFAC,PO | Performed by: INTERNAL MEDICINE

## 2021-06-30 ENCOUNTER — EXTERNAL CHRONIC CARE MANAGEMENT (OUTPATIENT)
Dept: PRIMARY CARE CLINIC | Facility: CLINIC | Age: 81
End: 2021-06-30
Payer: MEDICARE

## 2021-06-30 ENCOUNTER — TELEPHONE (OUTPATIENT)
Dept: FAMILY MEDICINE | Facility: CLINIC | Age: 81
End: 2021-06-30

## 2021-06-30 PROCEDURE — 99490 CHRNC CARE MGMT STAFF 1ST 20: CPT | Mod: PBBFAC,PO | Performed by: INTERNAL MEDICINE

## 2021-06-30 PROCEDURE — 99490 PR CHRONIC CARE MGMT, 1ST 20 MIN: ICD-10-PCS | Mod: S$PBB,,, | Performed by: INTERNAL MEDICINE

## 2021-06-30 PROCEDURE — 99490 CHRNC CARE MGMT STAFF 1ST 20: CPT | Mod: S$PBB,,, | Performed by: INTERNAL MEDICINE

## 2021-07-01 ENCOUNTER — LAB VISIT (OUTPATIENT)
Dept: LAB | Facility: HOSPITAL | Age: 81
End: 2021-07-01
Attending: INTERNAL MEDICINE
Payer: MEDICARE

## 2021-07-01 DIAGNOSIS — E11.9 CONTROLLED TYPE 2 DIABETES MELLITUS WITHOUT COMPLICATION, WITHOUT LONG-TERM CURRENT USE OF INSULIN: ICD-10-CM

## 2021-07-01 LAB
ESTIMATED AVG GLUCOSE: 123 MG/DL (ref 68–131)
HBA1C MFR BLD: 5.9 % (ref 4–5.6)

## 2021-07-01 PROCEDURE — 36415 COLL VENOUS BLD VENIPUNCTURE: CPT | Mod: PO | Performed by: INTERNAL MEDICINE

## 2021-07-01 PROCEDURE — 83036 HEMOGLOBIN GLYCOSYLATED A1C: CPT | Performed by: INTERNAL MEDICINE

## 2021-07-09 ENCOUNTER — OFFICE VISIT (OUTPATIENT)
Dept: FAMILY MEDICINE | Facility: CLINIC | Age: 81
End: 2021-07-09
Payer: MEDICARE

## 2021-07-09 VITALS
HEART RATE: 55 BPM | DIASTOLIC BLOOD PRESSURE: 62 MMHG | SYSTOLIC BLOOD PRESSURE: 132 MMHG | OXYGEN SATURATION: 97 % | WEIGHT: 140.13 LBS | TEMPERATURE: 98 F | HEIGHT: 63 IN | BODY MASS INDEX: 24.83 KG/M2

## 2021-07-09 DIAGNOSIS — Z00.00 ROUTINE MEDICAL EXAM: Primary | ICD-10-CM

## 2021-07-09 DIAGNOSIS — E11.9 CONTROLLED TYPE 2 DIABETES MELLITUS WITHOUT COMPLICATION, WITHOUT LONG-TERM CURRENT USE OF INSULIN: ICD-10-CM

## 2021-07-09 DIAGNOSIS — N18.30 BENIGN HYPERTENSION WITH CHRONIC KIDNEY DISEASE, STAGE III: ICD-10-CM

## 2021-07-09 DIAGNOSIS — M85.80 OSTEOPENIA AFTER MENOPAUSE: ICD-10-CM

## 2021-07-09 DIAGNOSIS — Z23 NEED FOR SHINGLES VACCINE: ICD-10-CM

## 2021-07-09 DIAGNOSIS — Z78.0 OSTEOPENIA AFTER MENOPAUSE: ICD-10-CM

## 2021-07-09 DIAGNOSIS — I70.0 ATHEROSCLEROSIS OF ABDOMINAL AORTA: ICD-10-CM

## 2021-07-09 DIAGNOSIS — E78.5 HYPERLIPIDEMIA LDL GOAL <100: ICD-10-CM

## 2021-07-09 DIAGNOSIS — I12.9 BENIGN HYPERTENSION WITH CHRONIC KIDNEY DISEASE, STAGE III: ICD-10-CM

## 2021-07-09 DIAGNOSIS — Z23 NEED FOR TDAP VACCINATION: ICD-10-CM

## 2021-07-09 DIAGNOSIS — T46.6X5A STATIN MYOPATHY: ICD-10-CM

## 2021-07-09 DIAGNOSIS — M54.9 BACK PAIN, UNSPECIFIED BACK LOCATION, UNSPECIFIED BACK PAIN LATERALITY, UNSPECIFIED CHRONICITY: ICD-10-CM

## 2021-07-09 DIAGNOSIS — N25.81 SECONDARY HYPERPARATHYROIDISM OF RENAL ORIGIN: ICD-10-CM

## 2021-07-09 DIAGNOSIS — E66.3 OVERWEIGHT (BMI 25.0-29.9): ICD-10-CM

## 2021-07-09 DIAGNOSIS — G72.0 STATIN MYOPATHY: ICD-10-CM

## 2021-07-09 DIAGNOSIS — E55.9 VITAMIN D DEFICIENCY DISEASE: ICD-10-CM

## 2021-07-09 DIAGNOSIS — Z71.89 ADVANCED DIRECTIVES, COUNSELING/DISCUSSION: ICD-10-CM

## 2021-07-09 DIAGNOSIS — D56.3 ALPHA THALASSEMIA TRAIT: ICD-10-CM

## 2021-07-09 DIAGNOSIS — Z12.31 ENCOUNTER FOR SCREENING MAMMOGRAM FOR BREAST CANCER: ICD-10-CM

## 2021-07-09 PROCEDURE — 99397 PER PM REEVAL EST PAT 65+ YR: CPT | Mod: S$PBB,,, | Performed by: INTERNAL MEDICINE

## 2021-07-09 PROCEDURE — 99999 PR PBB SHADOW E&M-EST. PATIENT-LVL V: ICD-10-PCS | Mod: PBBFAC,,, | Performed by: INTERNAL MEDICINE

## 2021-07-09 PROCEDURE — 99215 OFFICE O/P EST HI 40 MIN: CPT | Mod: PBBFAC,PO | Performed by: INTERNAL MEDICINE

## 2021-07-09 PROCEDURE — 99397 PR PREVENTIVE VISIT,EST,65 & OVER: ICD-10-PCS | Mod: S$PBB,,, | Performed by: INTERNAL MEDICINE

## 2021-07-09 PROCEDURE — 99999 PR PBB SHADOW E&M-EST. PATIENT-LVL V: CPT | Mod: PBBFAC,,, | Performed by: INTERNAL MEDICINE

## 2021-07-09 RX ORDER — HYDROCHLOROTHIAZIDE 25 MG/1
25 TABLET ORAL DAILY
Qty: 90 TABLET | Refills: 1 | Status: SHIPPED | OUTPATIENT
Start: 2021-07-09 | End: 2022-01-29 | Stop reason: SDUPTHER

## 2021-07-09 RX ORDER — LISINOPRIL 40 MG/1
40 TABLET ORAL DAILY
Qty: 90 TABLET | Refills: 1 | Status: SHIPPED | OUTPATIENT
Start: 2021-07-09 | End: 2022-01-29 | Stop reason: SDUPTHER

## 2021-07-09 RX ORDER — MONTELUKAST SODIUM 4 MG/1
2 TABLET, CHEWABLE ORAL 2 TIMES DAILY
Qty: 360 TABLET | Refills: 1 | Status: SHIPPED | OUTPATIENT
Start: 2021-07-09 | End: 2022-01-29 | Stop reason: SDUPTHER

## 2021-07-16 ENCOUNTER — PATIENT MESSAGE (OUTPATIENT)
Dept: FAMILY MEDICINE | Facility: CLINIC | Age: 81
End: 2021-07-16

## 2021-07-16 ENCOUNTER — HOSPITAL ENCOUNTER (OUTPATIENT)
Dept: RADIOLOGY | Facility: HOSPITAL | Age: 81
Discharge: HOME | End: 2021-07-16
Attending: INTERNAL MEDICINE
Payer: MEDICARE

## 2021-07-16 DIAGNOSIS — Z12.31 ENCOUNTER FOR SCREENING MAMMOGRAM FOR BREAST CANCER: ICD-10-CM

## 2021-07-16 PROCEDURE — 77067 MAMMO DIGITAL SCREENING BILAT WITH TOMO: ICD-10-PCS | Mod: 26,,, | Performed by: RADIOLOGY

## 2021-07-16 PROCEDURE — 77067 SCR MAMMO BI INCL CAD: CPT | Mod: TC,PO

## 2021-07-16 PROCEDURE — 77063 MAMMO DIGITAL SCREENING BILAT WITH TOMO: ICD-10-PCS | Mod: 26,,, | Performed by: RADIOLOGY

## 2021-07-16 PROCEDURE — 77067 SCR MAMMO BI INCL CAD: CPT | Mod: 26,,, | Performed by: RADIOLOGY

## 2021-07-16 PROCEDURE — 77063 BREAST TOMOSYNTHESIS BI: CPT | Mod: 26,,, | Performed by: RADIOLOGY

## 2021-08-20 ENCOUNTER — PES CALL (OUTPATIENT)
Dept: ADMINISTRATIVE | Facility: CLINIC | Age: 81
End: 2021-08-20

## 2021-08-27 ENCOUNTER — PATIENT MESSAGE (OUTPATIENT)
Dept: FAMILY MEDICINE | Facility: CLINIC | Age: 81
End: 2021-08-27

## 2021-10-26 ENCOUNTER — PATIENT MESSAGE (OUTPATIENT)
Dept: FAMILY MEDICINE | Facility: CLINIC | Age: 81
End: 2021-10-26
Payer: MEDICARE

## 2021-10-27 ENCOUNTER — PATIENT MESSAGE (OUTPATIENT)
Dept: FAMILY MEDICINE | Facility: CLINIC | Age: 81
End: 2021-10-27
Payer: MEDICARE

## 2021-12-03 ENCOUNTER — PATIENT MESSAGE (OUTPATIENT)
Dept: FAMILY MEDICINE | Facility: CLINIC | Age: 81
End: 2021-12-03
Payer: MEDICARE

## 2021-12-04 ENCOUNTER — PATIENT MESSAGE (OUTPATIENT)
Dept: FAMILY MEDICINE | Facility: CLINIC | Age: 81
End: 2021-12-04
Payer: MEDICARE

## 2021-12-08 DIAGNOSIS — E11.9 CONTROLLED TYPE 2 DIABETES MELLITUS WITHOUT COMPLICATION, WITHOUT LONG-TERM CURRENT USE OF INSULIN: ICD-10-CM

## 2022-01-24 LAB
LEFT EYE DM RETINOPATHY: NEGATIVE
RIGHT EYE DM RETINOPATHY: NEGATIVE

## 2022-01-27 ENCOUNTER — TELEPHONE (OUTPATIENT)
Dept: FAMILY MEDICINE | Facility: CLINIC | Age: 82
End: 2022-01-27
Payer: MEDICARE

## 2022-01-27 DIAGNOSIS — I12.9 BENIGN HYPERTENSION WITH CHRONIC KIDNEY DISEASE, STAGE III: ICD-10-CM

## 2022-01-27 DIAGNOSIS — N18.30 BENIGN HYPERTENSION WITH CHRONIC KIDNEY DISEASE, STAGE III: ICD-10-CM

## 2022-01-27 DIAGNOSIS — E78.5 HYPERLIPIDEMIA LDL GOAL <100: ICD-10-CM

## 2022-01-27 DIAGNOSIS — E11.9 CONTROLLED TYPE 2 DIABETES MELLITUS WITHOUT COMPLICATION, WITHOUT LONG-TERM CURRENT USE OF INSULIN: Primary | ICD-10-CM

## 2022-01-27 NOTE — TELEPHONE ENCOUNTER
Spoke to pt and sched labs tomorrow morning 1/28/22. Pt stated she willpossibly get her flu shot at the OV Saturday with javad.    Visit Information Date & Time Provider Department Dept. Phone Encounter #  
 5/8/2017  2:40 PM [facility] [phone] Follow-up Instructions Return in about 2 months (around 7/8/2017). Your Appointments 5/8/2017  2:40 PM  
Follow Up with MD Andreas Astudillo [facility] at [address]) Appt Note: follow up  seizures cl  
 [address] [facility] [address]  
[phone]  
  
   
 [address] [address]  
  
    
 7/20/2017  2:00 PM  
Any with Giovana Ricketts MD  
[address] [place] [address] ([facility]) Appt Note: yrly cpap follow up [address] [id number]  
[phone]  
  
   
 [address] [facility] [address] [place], [place] [address] [phone]  
  
    
 7/26/2017  8:15 AM  
ROUTINE CARE with Emilee Li MD  
[facility] [address] [place] [address]) Appt Note: 3 month follow-up [address] [facility] [address] [facility] [address]  
[facility] [address] [facility] [id number] Upcoming Health Maintenance Date Due DTaP/Tdap/Td series (1 - Tdap) 8/15/1971 GLAUCOMA SCREENING Q2Y 8/15/2015 FOBT Q 1 YEAR AGE 50-75 3/25/2016 MEDICARE YEARLY EXAM 7/22/2017 INFLUENZA AGE 9 TO ADULT 8/1/2017 BREAST CANCER SCRN MAMMOGRAM 8/12/2018 Pneumococcal 65+ High/Highest Risk (2 of 2 - PPSV23) 6/1/2019 Allergies as of 5/8/2017  Review Complete On: 5/8/2017 By: Charo Saunders LPN Severity Noted Reaction Type Reactions Haldol [Haloperidol Lactate]  08/26/2014    Other (comments) Current Immunizations  Reviewed on 3/11/2015 Name Date Influenza High Dose Vaccine PF 8/22/2016 Influenza Vaccine 10/15/2014 Influenza Vaccine Split 10/13/2010 Pneumococcal Conjugate (PCV-13) 7/21/2016 Pneumococcal Vaccine (Unspecified Type) 6/1/2014 Zoster Vaccine, Live 8/22/2016 Not reviewed this visit You Were Diagnosed With   
  
 Codes Comments Seizure disorder ([facility]    -  Primary ICD-10-CM: K86.976 ICD-9-CM: 345.90 Lumbar stenosis     ICD-10-CM: M48.06 
ICD-9-CM: 724.02 Gait disturbance     ICD-10-CM: R26.9 ICD-9-CM: 369. 2 Bilateral leg numbness     ICD-10-CM: R20.0 ICD-9-CM: 478. 0 Vitals BP Pulse Resp Height(growth percentile) Weight(growth percentile) BMI  
 122/76 78 18 5' 5\" (1.651 m) 257 lb (116.6 kg) 42.77 kg/m2 OB Status Smoking Status Hysterectomy Never Smoker Vitals History BMI and BSA Data Body Mass Index Body Surface Area 42.77 kg/m 2 2.31 m 2 Preferred Pharmacy Pharmacy Name [facility] [address] [place], [place] [address]mick Walker [phone] Your Updated Medication List  
  
   
This list is accurate as of: 5/8/17  1:56 PM.  Always use your most recent med list.  
  
  
  
  
 albuterol 90 mcg/actuation inhaler Commonly known as:  PROAIR HFA Take 1 Puff by inhalation every four (4) hours as needed. Indications: BRONCHOSPASM PREVENTION  
  
 anastrozole 1 mg tablet Commonly known as:  ARIMIDEX  
take 1 tablet by mouth once daily  
  
 aspirin delayed-release 81 mg tablet Take 1 Tab by mouth daily. baclofen 10 mg tablet Commonly known as:  LIORESAL  
take 1/2 tablet by mouth twice a day  
  
 benztropine 1 mg tablet Commonly known as:  COGENTIN Take 1 mg by mouth two (2) times a day. divalproex  mg tablet Commonly known as:  DEPAKOTE Take 1 Tab by mouth two (2) times a day. FLUoxetine 10 mg capsule Commonly known as:  PROzac TAKE ONE CAPSULE BY MOUTH EVERY DAY  
  
 furosemide 20 mg tablet Commonly known as:  LASIX Take 20 mg by mouth every other day.  
  
 gabapentin 300 mg capsule Commonly known as:  NEURONTIN  
 Take 1 Cap by mouth two (2) times a day. HYDROcodone-acetaminophen 5-325 mg per tablet Commonly known as:  [place] Hurt Take 1 Tab by mouth every six (6) hours as needed for Pain. Max Daily Amount: 4 Tabs. ibuprofen 800 mg tablet Commonly known as:  MOTRIN Take 1 Tab by mouth every eight (8) hours as needed for Pain.  
  
 inhalational spacing device 1 Each by Does Not Apply route as needed. loratadine 10 mg tablet Commonly known as:  CLARITIN  
  
 miconazole 2 % topical ointment Commonly known as:  Ban Leer Apply  to affected area two (2) times a day. naproxen 500 mg tablet Commonly known as:  NAPROSYN  
  
 polyethylene glycol 17 gram/dose powder Commonly known as:  Merle Pile Take 8.5 g by mouth daily. potassium chloride 40 mEq/15 mL Liqd Commonly known as:  KAON 20%  
take 7.5 milliliters by mouth once daily for HYPOKALEMIA PREVENTION  
  
 risperiDONE 2 mg tablet Commonly known as:  RisperDAL Take 1 Tab by mouth two (2) times a day. Prescriptions Sent to Pharmacy Refills  
 divalproex DR (DEPAKOTE) 500 mg tablet 1 Sig: Take 1 Tab by mouth two (2) times a day. Class: Normal  
 Pharmacy: [address] #: [phone] Route: Oral  
  
Follow-up Instructions Return in about 2 months (around 7/8/2017). To-Do List   
 05/16/2017 Imaging:  MRI LUMB SPINE WO CONT Patient Instructions PRESCRIPTION REFILL POLICY [facility] Statement to Patients April 1, 2014 In an effort to ensure the large volume of patient prescription refills is processed in the most efficient and expeditious manner, we are asking our patients to assist us by calling your Pharmacy for all prescription refills, this will include also your  Mail Order Pharmacy. The pharmacy will contact our office electronically to continue the refill process. Please do not wait until the last minute to call your pharmacy. We need at least 48 hours (2days) to fill prescriptions. We also encourage you to call your pharmacy before going to  your prescription to make sure it is ready. With regard to controlled substance prescription refill requests (narcotic refills) that need to be picked up at our office, we ask your cooperation by providing us with at least 72 hours (3days) notice that you will need a refill. We will not refill narcotic prescription refill requests after 4:00pm on any weekday, Monday through Thursday, or after 2:00pm on Fridays, or on the weekends. We encourage everyone to explore another way of getting your prescription refill request processed using "[employer] our patient web portal through our electronic medical record system. "[employer] is an efficient and effective way to communicate your medication request directly to the office and  downloadable as an yomi on your smart phone . "[employer] also features a review functionality that allows you to view your medication list as well as leave messages for your physician. Are you ready to get connected? If so please review the attatched instructions or speak to any of our staff to get you set up right away! Thank you so much for your cooperation. Should you have any questions please contact our Practice Administrator. The Physicians and Staff,  [facility] Please bring all medication bottles, including vitamins, supplements and any over-the-counter medications, to your next office visit. Introducing \[facility]\"" & HEALTH SERVICES! Dear Wilda Marinelli: Thank you for requesting a "[employer] account. Our records indicate that you already have an active "[employer] account. You can access your account anytime at [email or web address] Did you know that you can access your hospital and ER discharge instructions at any time in [employer]? You can also review all of your test results from your hospital stay or ER visit. Additional Information If you have questions, please visit the Frequently Asked Questions section of the [employer] website at [email or web address] Remember, [employer] is NOT to be used for urgent needs. For medical emergencies, dial 911. Now available from your iPhone and Android! Please provide this summary of care documentation to your next provider. Your primary care clinician is listed as Esequiel Eason. If you have any questions after today's visit, please call [phone].

## 2022-01-27 NOTE — TELEPHONE ENCOUNTER
----- Message from Nusrat Crzu MD sent at 1/27/2022 12:51 PM CST -----  Please call patient to schedule labs and address health maintenance prior to next office visit.

## 2022-01-28 ENCOUNTER — LAB VISIT (OUTPATIENT)
Dept: LAB | Facility: HOSPITAL | Age: 82
End: 2022-01-28
Attending: INTERNAL MEDICINE
Payer: MEDICARE

## 2022-01-28 DIAGNOSIS — E11.9 CONTROLLED TYPE 2 DIABETES MELLITUS WITHOUT COMPLICATION, WITHOUT LONG-TERM CURRENT USE OF INSULIN: ICD-10-CM

## 2022-01-28 DIAGNOSIS — I12.9 BENIGN HYPERTENSION WITH CHRONIC KIDNEY DISEASE, STAGE III: ICD-10-CM

## 2022-01-28 DIAGNOSIS — E78.5 HYPERLIPIDEMIA LDL GOAL <100: ICD-10-CM

## 2022-01-28 DIAGNOSIS — N18.30 BENIGN HYPERTENSION WITH CHRONIC KIDNEY DISEASE, STAGE III: ICD-10-CM

## 2022-01-28 LAB
ALBUMIN SERPL BCP-MCNC: 4 G/DL (ref 3.5–5.2)
ALP SERPL-CCNC: 68 U/L (ref 55–135)
ALT SERPL W/O P-5'-P-CCNC: 8 U/L (ref 10–44)
ANION GAP SERPL CALC-SCNC: 11 MMOL/L (ref 8–16)
AST SERPL-CCNC: 13 U/L (ref 10–40)
BASOPHILS # BLD AUTO: 0.06 K/UL (ref 0–0.2)
BASOPHILS NFR BLD: 1.1 % (ref 0–1.9)
BILIRUB SERPL-MCNC: 0.6 MG/DL (ref 0.1–1)
BUN SERPL-MCNC: 24 MG/DL (ref 8–23)
CALCIUM SERPL-MCNC: 9.6 MG/DL (ref 8.7–10.5)
CHLORIDE SERPL-SCNC: 105 MMOL/L (ref 95–110)
CHOLEST SERPL-MCNC: 244 MG/DL (ref 120–199)
CHOLEST/HDLC SERPL: 5.5 {RATIO} (ref 2–5)
CO2 SERPL-SCNC: 25 MMOL/L (ref 23–29)
CREAT SERPL-MCNC: 1.6 MG/DL (ref 0.5–1.4)
DIFFERENTIAL METHOD: ABNORMAL
EOSINOPHIL # BLD AUTO: 0.1 K/UL (ref 0–0.5)
EOSINOPHIL NFR BLD: 2.2 % (ref 0–8)
ERYTHROCYTE [DISTWIDTH] IN BLOOD BY AUTOMATED COUNT: 15.8 % (ref 11.5–14.5)
EST. GFR  (AFRICAN AMERICAN): 34.6 ML/MIN/1.73 M^2
EST. GFR  (NON AFRICAN AMERICAN): 30 ML/MIN/1.73 M^2
ESTIMATED AVG GLUCOSE: 128 MG/DL (ref 68–131)
GLUCOSE SERPL-MCNC: 82 MG/DL (ref 70–110)
HBA1C MFR BLD: 6.1 % (ref 4–5.6)
HCT VFR BLD AUTO: 46.3 % (ref 37–48.5)
HDLC SERPL-MCNC: 44 MG/DL (ref 40–75)
HDLC SERPL: 18 % (ref 20–50)
HGB BLD-MCNC: 13.3 G/DL (ref 12–16)
IMM GRANULOCYTES # BLD AUTO: 0.01 K/UL (ref 0–0.04)
IMM GRANULOCYTES NFR BLD AUTO: 0.2 % (ref 0–0.5)
LDLC SERPL CALC-MCNC: 170.6 MG/DL (ref 63–159)
LYMPHOCYTES # BLD AUTO: 2.2 K/UL (ref 1–4.8)
LYMPHOCYTES NFR BLD: 41.2 % (ref 18–48)
MCH RBC QN AUTO: 22.4 PG (ref 27–31)
MCHC RBC AUTO-ENTMCNC: 28.7 G/DL (ref 32–36)
MCV RBC AUTO: 78 FL (ref 82–98)
MONOCYTES # BLD AUTO: 0.4 K/UL (ref 0.3–1)
MONOCYTES NFR BLD: 8.1 % (ref 4–15)
NEUTROPHILS # BLD AUTO: 2.6 K/UL (ref 1.8–7.7)
NEUTROPHILS NFR BLD: 47.2 % (ref 38–73)
NONHDLC SERPL-MCNC: 200 MG/DL
NRBC BLD-RTO: 0 /100 WBC
PLATELET # BLD AUTO: 170 K/UL (ref 150–450)
PMV BLD AUTO: ABNORMAL FL (ref 9.2–12.9)
POTASSIUM SERPL-SCNC: 3.9 MMOL/L (ref 3.5–5.1)
PROT SERPL-MCNC: 7.5 G/DL (ref 6–8.4)
RBC # BLD AUTO: 5.94 M/UL (ref 4–5.4)
SODIUM SERPL-SCNC: 141 MMOL/L (ref 136–145)
TRIGL SERPL-MCNC: 147 MG/DL (ref 30–150)
WBC # BLD AUTO: 5.41 K/UL (ref 3.9–12.7)

## 2022-01-28 PROCEDURE — 36415 COLL VENOUS BLD VENIPUNCTURE: CPT | Mod: PO | Performed by: INTERNAL MEDICINE

## 2022-01-28 PROCEDURE — 85025 COMPLETE CBC W/AUTO DIFF WBC: CPT | Performed by: INTERNAL MEDICINE

## 2022-01-28 PROCEDURE — 80061 LIPID PANEL: CPT | Performed by: INTERNAL MEDICINE

## 2022-01-28 PROCEDURE — 83036 HEMOGLOBIN GLYCOSYLATED A1C: CPT | Performed by: INTERNAL MEDICINE

## 2022-01-28 PROCEDURE — 80053 COMPREHEN METABOLIC PANEL: CPT | Performed by: INTERNAL MEDICINE

## 2022-01-29 ENCOUNTER — OFFICE VISIT (OUTPATIENT)
Dept: FAMILY MEDICINE | Facility: CLINIC | Age: 82
End: 2022-01-29
Payer: MEDICARE

## 2022-01-29 VITALS
WEIGHT: 137.56 LBS | TEMPERATURE: 98 F | BODY MASS INDEX: 24.38 KG/M2 | SYSTOLIC BLOOD PRESSURE: 114 MMHG | OXYGEN SATURATION: 97 % | HEIGHT: 63 IN | DIASTOLIC BLOOD PRESSURE: 56 MMHG | HEART RATE: 68 BPM

## 2022-01-29 DIAGNOSIS — G72.0 STATIN MYOPATHY: ICD-10-CM

## 2022-01-29 DIAGNOSIS — R04.0 EPISTAXIS: ICD-10-CM

## 2022-01-29 DIAGNOSIS — E55.9 VITAMIN D DEFICIENCY DISEASE: ICD-10-CM

## 2022-01-29 DIAGNOSIS — Z23 FLU VACCINE NEED: ICD-10-CM

## 2022-01-29 DIAGNOSIS — E11.9 CONTROLLED TYPE 2 DIABETES MELLITUS WITHOUT COMPLICATION, WITHOUT LONG-TERM CURRENT USE OF INSULIN: Primary | ICD-10-CM

## 2022-01-29 DIAGNOSIS — Z78.0 OSTEOPENIA AFTER MENOPAUSE: ICD-10-CM

## 2022-01-29 DIAGNOSIS — Z23 NEED FOR TDAP VACCINATION: ICD-10-CM

## 2022-01-29 DIAGNOSIS — I70.0 ATHEROSCLEROSIS OF ABDOMINAL AORTA: ICD-10-CM

## 2022-01-29 DIAGNOSIS — N18.30 BENIGN HYPERTENSION WITH CHRONIC KIDNEY DISEASE, STAGE III: ICD-10-CM

## 2022-01-29 DIAGNOSIS — N25.81 SECONDARY HYPERPARATHYROIDISM OF RENAL ORIGIN: ICD-10-CM

## 2022-01-29 DIAGNOSIS — T46.6X5A STATIN MYOPATHY: ICD-10-CM

## 2022-01-29 DIAGNOSIS — D56.3 ALPHA THALASSEMIA TRAIT: ICD-10-CM

## 2022-01-29 DIAGNOSIS — Z23 NEED FOR SHINGLES VACCINE: ICD-10-CM

## 2022-01-29 DIAGNOSIS — E78.5 HYPERLIPIDEMIA LDL GOAL <100: ICD-10-CM

## 2022-01-29 DIAGNOSIS — M85.80 OSTEOPENIA AFTER MENOPAUSE: ICD-10-CM

## 2022-01-29 DIAGNOSIS — I12.9 BENIGN HYPERTENSION WITH CHRONIC KIDNEY DISEASE, STAGE III: ICD-10-CM

## 2022-01-29 PROCEDURE — 99214 OFFICE O/P EST MOD 30 MIN: CPT | Mod: S$PBB,,, | Performed by: INTERNAL MEDICINE

## 2022-01-29 PROCEDURE — 99999 PR PBB SHADOW E&M-EST. PATIENT-LVL IV: ICD-10-PCS | Mod: PBBFAC,,, | Performed by: INTERNAL MEDICINE

## 2022-01-29 PROCEDURE — 99999 PR PBB SHADOW E&M-EST. PATIENT-LVL IV: CPT | Mod: PBBFAC,,, | Performed by: INTERNAL MEDICINE

## 2022-01-29 PROCEDURE — 99214 OFFICE O/P EST MOD 30 MIN: CPT | Mod: PBBFAC,PO | Performed by: INTERNAL MEDICINE

## 2022-01-29 PROCEDURE — 99214 PR OFFICE/OUTPT VISIT, EST, LEVL IV, 30-39 MIN: ICD-10-PCS | Mod: S$PBB,,, | Performed by: INTERNAL MEDICINE

## 2022-01-29 PROCEDURE — G0008 ADMIN INFLUENZA VIRUS VAC: HCPCS | Mod: PBBFAC,PO

## 2022-01-29 RX ORDER — MONTELUKAST SODIUM 4 MG/1
2 TABLET, CHEWABLE ORAL 2 TIMES DAILY
Qty: 360 TABLET | Refills: 1 | Status: SHIPPED | OUTPATIENT
Start: 2022-01-29 | End: 2023-05-05 | Stop reason: SDUPTHER

## 2022-01-29 RX ORDER — HYDROCHLOROTHIAZIDE 25 MG/1
25 TABLET ORAL DAILY
Qty: 90 TABLET | Refills: 1 | Status: SHIPPED | OUTPATIENT
Start: 2022-01-29 | End: 2022-12-20

## 2022-01-29 RX ORDER — LISINOPRIL 40 MG/1
40 TABLET ORAL DAILY
Qty: 90 TABLET | Refills: 1 | Status: SHIPPED | OUTPATIENT
Start: 2022-01-29 | End: 2022-12-20

## 2022-01-29 NOTE — PROGRESS NOTES
HISTORY OF PRESENT ILLNESS:  Lizbeth Hutchison is a 81 y.o. female who presents to the clinic today for General Diabetes Follow-up  .   The patient presents to clinic today for follow-up of her type 2 diabetes mellitus, hypertension complicated by chronic kidney disease stage 3, and hyperlipidemia. The patient denies any problems with cardiac chest pain, dizziness, palpitations, orthopnea, or lower extremity edema.  The patient reports compliance with current medication- patient denies missing any  medication doses.  The patient reports that she was leaving her [employer] last Sunday and her car would not start.  She has known anxiety and panic attacks and she got very nervous.  She called her nephew who came and jump started her car.  She states while she was waiting on him she developed a nose bleed.  It was a mild trickle.  It took about 2 hours to stop.  She had additional very mild nose bleed later in the week when she blew her nose.  She has been trying to avoid using her central heat in her house.  She reports some additional bleeding today when she blew her nose this morning.  She has not been using any nose sprays.  She denies any other URI symptoms.      PAST MEDICAL HISTORY:  Past Medical History:   Diagnosis Date    Alpha thalassemia trait     Anxiety     Atherosclerosis of abdominal aorta     noted on CT scan 4/6/2016    DDD (degenerative disc disease), lumbar     chronic low back pain    Diverticulosis     History of colonic polyps     last colonoscopy 2011 - normal    Hyperlipidemia LDL goal <100     unable to tolerate statins or Welchol    Hypertension     Osteopenia     no need for medication at this time - last BMD October 2010    Overweight     Type II or unspecified type diabetes mellitus without mention of complication, not stated as uncontrolled     Vitamin D deficiency disease     resolved with supplementation       PAST SURGICAL HISTORY:  Past Surgical History:   Procedure  Laterality Date    CATARACT EXTRACTION Bilateral 19    samuel      bilateral    HEMORRHOID SURGERY  1970    KELOID EXCISION  ,     abdominal wall    TOTAL ABDOMINAL HYSTERECTOMY      TRIGGER FINGER RELEASE      left hand       SOCIAL HISTORY:  Social History     Socioeconomic History    Marital status:     Number of children: 2   Occupational History    Occupation:    Tobacco Use    Smoking status: Never Smoker    Smokeless tobacco: Never Used   Substance and Sexual Activity    Alcohol use: No       FAMILY HISTORY:  Family History   Adopted: Yes   Problem Relation Age of Onset    Heart failure Mother     Alzheimer's disease Mother     Breast cancer Maternal Grandmother     Other Sister          from too much anesthesia    Congenital heart disease Brother     Arthritis Sister         back problems    Hypertension Sister     Transient ischemic attack Sister     Stroke Brother     Coronary artery disease Brother         s/p stenting    Stroke Brother     Colon cancer Other     Other Son         had eosinophilic granulomatosis -  shortly after lung transplant    Diabetes Neg Hx        ALLERGIES AND MEDICATIONS: updated and reviewed.  Review of patient's allergies indicates:   Allergen Reactions    Cholesterol analogues Other (See Comments)     Muscle spasam    Clindamycin Hives    Statins-hmg-coa reductase inhibitors Other (See Comments)    Sulfa (sulfonamide antibiotics)     Tramadol Nausea And Vomiting    Welchol [colesevelam] Other (See Comments)    Codeine Rash    Penicillins Rash     Medication List with Changes/Refills   Current Medications    CALCIUM ORAL    Take 1 tablet by mouth once daily.    CYANOCOBALAMIN (VITAMIN B-12) 1000 MCG TABLET    Take 100 mcg by mouth once daily.    FLUTICASONE (VERAMYST) 27.5 MCG/ACTUATION NASAL SPRAY    2 sprays by Nasal route once daily.    LIDOCAINE HCL 2% 2 % JELP WITH  CYCLOPENTOLATE 2 % DROP 5 DROP, TROPICAMIDE 1% 1 % DROP 5 DROP, PHENYLEPHRINE HCL 10% 10 % DROP 5 DROP    AS DIRECTED    MULTIVITAMIN/IRON/FOLIC ACID (CENTRUM COMPLETE ORAL)    Take 1 tablet by mouth once daily.    OMEGA-3 FATTY ACIDS 1,000 MG CAP    Take by mouth. 1 Capsule Oral Every day    VITAMIN D 185 MG TAB    Take 185 mg by mouth once daily.   Changed and/or Refilled Medications    Modified Medication Previous Medication    COLESTIPOL (COLESTID) 1 GRAM TAB colestipoL (COLESTID) 1 gram Tab       Take 2 tablets (2 g total) by mouth 2 (two) times daily.    Take 2 tablets (2 g total) by mouth 2 (two) times daily.    HYDROCHLOROTHIAZIDE (HYDRODIURIL) 25 MG TABLET hydroCHLOROthiazide (HYDRODIURIL) 25 MG tablet       Take 1 tablet (25 mg total) by mouth once daily.    Take 1 tablet (25 mg total) by mouth once daily.    LISINOPRIL (PRINIVIL,ZESTRIL) 40 MG TABLET lisinopriL (PRINIVIL,ZESTRIL) 40 MG tablet       Take 1 tablet (40 mg total) by mouth once daily.    Take 1 tablet (40 mg total) by mouth once daily.         CARE TEAM:  Patient Care Team:  Nusrat Cruz MD as PCP - General (Internal Medicine)  Cindy Oscar LPN as Licensed Practical Nurse  Shira Barksdale OD (Ophthalmology)         REVIEW OF SYSTEMS:  Review of Systems   Constitutional: Negative for chills, fatigue, fever and unexpected weight change.   HENT: Positive for nosebleeds. Negative for congestion and postnasal drip.    Eyes: Negative for pain and visual disturbance.   Respiratory: Negative for cough, shortness of breath and wheezing.    Cardiovascular: Negative for chest pain, palpitations and leg swelling.   Gastrointestinal: Negative for abdominal pain, constipation, diarrhea, nausea and vomiting.        She has intermittent problems with fecal incontinence. She thinks it may be related to eating certain foods.   Genitourinary: Negative for dysuria.   Musculoskeletal: Negative for arthralgias and back pain.   Skin: Negative for rash.  "  Neurological: Negative for weakness and headaches.   Psychiatric/Behavioral: Negative for dysphoric mood and sleep disturbance. The patient is nervous/anxious.          PHYSICAL EXAM:   Vitals:    01/29/22 1014   BP: (!) 114/56   Pulse: 68   Temp: 98 °F (36.7 °C)     Weight: 62.4 kg (137 lb 9.1 oz)   Height: 5' 3" (160 cm)   Body mass index is 24.37 kg/m².      General appearance - alert, well appearing, and in no distress, normal appearing weight  Psychiatric - alert, oriented to person, place, and time, normal behavior, speech, dress, motor activity and thought process  Eyes - pupils equal and reactive, extraocular eye movements intact, sclera anicteric  Nose - not examined  Mouth - not examined; patient wearing mask due to Covid 19 pandemic  Neck - supple, no significant adenopathy, carotids upstroke normal bilaterally, no bruits  Lymphatics - no palpable cervical lymphadenopathy  Chest - clear to auscultation, no wheezes, rales or rhonchi, symmetric air entry  Heart - normal rate and regular rhythm, no gallops noted  Neurological - alert, normal speech, no focal findings or movement disorder noted, cranial nerves II through XII intact  Musculoskeletal - no joint tenderness, deformity or swelling, no muscular tenderness noted  Extremities - peripheral pulses normal, no pedal edema, no clubbing or cyanosis  Skin - normal coloration and turgor, no rashes, no suspicious skin lesions noted  Protective Sensation (w/ 10 gram monofilament):  Right: Intact  Left: Intact    Visual Inspection:  Normal -  Bilateral    Pedal Pulses:   Right: Present  Left: Present    Posterior tibialis:   Right:Present  Left: Present        Labs:  Lab Results   Component Value Date    HGBA1C 6.1 (H) 01/28/2022    HGBA1C 5.9 (H) 07/01/2021    HGBA1C 6.0 (H) 11/28/2020      Lab Results   Component Value Date    CHOL 244 (H) 01/28/2022    CHOL 242 (H) 11/28/2020    CHOL 228 (H) 04/28/2020     Lab Results   Component Value Date    LDLCALC " 170.6 (H) 01/28/2022    LDLCALC 162.6 (H) 11/28/2020    LDLCALC 140.2 04/28/2020         ASSESSMENT AND PLAN:  1. Controlled type 2 diabetes mellitus without complication, without long-term current use of insulin  Diabetes is under good control at this time for age and comorbid conditions.   We discussed diabetic diet and regular exercise.   We discussed home blood sugar monitoring, if appropriate - the patient does not need to test daily but can test only as needed.   Continue current medication regimen.  Diabetic complications addressed: Not applicable.  Patient was counseled on the need for yearly eye exam to screen for/monitor diabetic retinopathy and yearly diabetic foot exam.    2. Benign hypertension with chronic kidney disease, stage III  Discussed sodium restriction, maintaining ideal body weight and regular exercise program as physiologic means to achieve blood pressure control. The patient will strive towards this.   The current medical regimen is effective;  continue present plan and medications. Recommended patient to check home readings to monitor and see me for followup as scheduled or sooner as needed.   Patient was educated that both decongestant and anti-inflammatory medication may raise blood pressure.  Stable decreased kidney function. Observe. Patient counseled to avoid/minimize the use of anti-inflammatory  Medication. Discussed to stay well hydrated. Also discussed with patient that good control of blood pressure and/or diabetes, if present, will help to prevent progression.  The patient is not active on the digital hypertension program.  - lisinopriL (PRINIVIL,ZESTRIL) 40 MG tablet; Take 1 tablet (40 mg total) by mouth once daily.  Dispense: 90 tablet; Refill: 1  - hydroCHLOROthiazide (HYDRODIURIL) 25 MG tablet; Take 1 tablet (25 mg total) by mouth once daily.  Dispense: 90 tablet; Refill: 1    3. Hyperlipidemia LDL goal <100/4. Statin myopathy  We discussed low fat diet and regular exercise.  Patient is statin intolerant.  - colestipoL (COLESTID) 1 gram Tab; Take 2 tablets (2 g total) by mouth 2 (two) times daily.  Dispense: 360 tablet; Refill: 1    5. Atherosclerosis of abdominal aorta  Patient with Atherosclerosis of the Aorta.  Stable/asymptomatic. Currently stable on lipid and blood pressure monitoring. Statin intolerant.    6. Alpha thalassemia trait  Stable. Asymptomatic. Observe.    7. Osteopenia after menopause/8. Vitamin D deficiency disease  We discussed adequate calcium and vitamin D supplementation. We discussed fall precautions. She is up to date on her BMD. No need for prescription medication at this time.    9. Secondary hyperparathyroidism of renal origin  Stable. Asymptomatic. Observe.    10. Need for Tdap vaccination/11. Need for shingles vaccine  Patient was advised to get immunization at the pharmacy.    12. Flu vaccine need    - Influenza (FLUAD) - Quadrivalent (Adjuvanted) *Preferred* (65+) (PF)    13. Epistaxis  I suspect she has a minor irritation in the anterior aspect of her nose on the right side.  She will start using saline nasal spray a few times a day.  We discussed trying not to blow her  nose.  She can use Afrin sparingly as needed.  We discussed the proper way to treat a nose bleed:  Tilt your head forward and pinch your nose.  She can also apply ice to her nose.  Consider ENT consultation if symptoms worsen or persist.         Orders Placed This Encounter   Procedures    Influenza (FLUAD) - Quadrivalent (Adjuvanted) *Preferred* (65+) (PF)      Follow up in about 6 months (around 7/29/2022), or if symptoms worsen or fail to improve, for annual exam. or sooner as needed.

## 2022-01-31 ENCOUNTER — PATIENT OUTREACH (OUTPATIENT)
Dept: ADMINISTRATIVE | Facility: HOSPITAL | Age: 82
End: 2022-01-31
Payer: MEDICARE

## 2022-01-31 NOTE — LETTER
AUTHORIZATION FOR RELEASE OF   CONFIDENTIAL INFORMATION    Dear Dr. Shira Barksdale,    We are seeing Lizbeth Hutchison, date of birth 1940, in the clinic at [facility]. Nusrat Cruz MD is the patient's PCP. Lizbeth Hutchison has an outstanding lab/procedure at the time we reviewed her chart. In order to help keep her health information updated, she has authorized us to request the following medical record(s):        (  )  MAMMOGRAM                                      (  )  COLONOSCOPY      (  )  PAP SMEAR                                          (  )  OUTSIDE LAB RESULTS     (  )  DEXA SCAN                                          ( x )  EYE EXAM(diabetic)             (  )  FOOT EXAM                                          (  )  ENTIRE RECORD     (  )  OUTSIDE IMMUNIZATIONS                 (  )  _______________         Please fax records to [facility], Laura A [place], MD,  [phone].     If you have any questions, please contact Cindy Oscar LPN [facility] at   [phone].       Patient Name: Lizbeth Hutchison  : 1940  Patient Phone #: [phone]

## 2022-02-09 ENCOUNTER — PATIENT OUTREACH (OUTPATIENT)
Dept: ADMINISTRATIVE | Facility: HOSPITAL | Age: 82
End: 2022-02-09
Payer: MEDICARE

## 2022-02-10 ENCOUNTER — PES CALL (OUTPATIENT)
Dept: ADMINISTRATIVE | Facility: CLINIC | Age: 82
End: 2022-02-10
Payer: MEDICARE

## 2022-05-09 ENCOUNTER — PES CALL (OUTPATIENT)
Dept: ADMINISTRATIVE | Facility: CLINIC | Age: 82
End: 2022-05-09
Payer: MEDICARE

## 2022-06-16 ENCOUNTER — TELEPHONE (OUTPATIENT)
Dept: FAMILY MEDICINE | Facility: CLINIC | Age: 82
End: 2022-06-16
Payer: MEDICARE

## 2022-06-16 NOTE — TELEPHONE ENCOUNTER
Left a voicemail message to inform the Patient about the EAWV appointment and to schedule.  Requested the Patient to call the office back to be schedule.  Sent a detailed message thru [employer] as well.

## 2022-07-19 ENCOUNTER — PATIENT MESSAGE (OUTPATIENT)
Dept: FAMILY MEDICINE | Facility: CLINIC | Age: 82
End: 2022-07-19
Payer: MEDICARE

## 2022-07-19 DIAGNOSIS — E78.49 OTHER HYPERLIPIDEMIA: Primary | ICD-10-CM

## 2022-07-19 DIAGNOSIS — E11.9 CONTROLLED TYPE 2 DIABETES MELLITUS WITHOUT COMPLICATION, WITHOUT LONG-TERM CURRENT USE OF INSULIN: ICD-10-CM

## 2022-07-19 DIAGNOSIS — Z12.31 BREAST CANCER SCREENING BY MAMMOGRAM: ICD-10-CM

## 2022-07-19 NOTE — TELEPHONE ENCOUNTER
Order(s) placed/signed - please schedule.  Please advise patient that I recommend she get the second Covid booster now.

## 2022-08-03 ENCOUNTER — LAB VISIT (OUTPATIENT)
Dept: LAB | Facility: HOSPITAL | Age: 82
End: 2022-08-03
Attending: INTERNAL MEDICINE
Payer: MEDICARE

## 2022-08-03 DIAGNOSIS — E11.9 CONTROLLED TYPE 2 DIABETES MELLITUS WITHOUT COMPLICATION, WITHOUT LONG-TERM CURRENT USE OF INSULIN: ICD-10-CM

## 2022-08-03 DIAGNOSIS — E78.49 OTHER HYPERLIPIDEMIA: ICD-10-CM

## 2022-08-03 LAB
ALBUMIN SERPL BCP-MCNC: 3.7 G/DL (ref 3.5–5.2)
ALP SERPL-CCNC: 65 U/L (ref 55–135)
ALT SERPL W/O P-5'-P-CCNC: 21 U/L (ref 10–44)
ANION GAP SERPL CALC-SCNC: 7 MMOL/L (ref 8–16)
AST SERPL-CCNC: 21 U/L (ref 10–40)
BILIRUB SERPL-MCNC: 0.5 MG/DL (ref 0.1–1)
BUN SERPL-MCNC: 20 MG/DL (ref 8–23)
CALCIUM SERPL-MCNC: 9.4 MG/DL (ref 8.7–10.5)
CHLORIDE SERPL-SCNC: 103 MMOL/L (ref 95–110)
CO2 SERPL-SCNC: 28 MMOL/L (ref 23–29)
CREAT SERPL-MCNC: 1.7 MG/DL (ref 0.5–1.4)
EST. GFR  (NO RACE VARIABLE): 29.8 ML/MIN/1.73 M^2
ESTIMATED AVG GLUCOSE: 128 MG/DL (ref 68–131)
GLUCOSE SERPL-MCNC: 99 MG/DL (ref 70–110)
HBA1C MFR BLD: 6.1 % (ref 4–5.6)
POTASSIUM SERPL-SCNC: 4.3 MMOL/L (ref 3.5–5.1)
PROT SERPL-MCNC: 7.3 G/DL (ref 6–8.4)
SODIUM SERPL-SCNC: 138 MMOL/L (ref 136–145)

## 2022-08-03 PROCEDURE — 36415 COLL VENOUS BLD VENIPUNCTURE: CPT | Mod: PO | Performed by: INTERNAL MEDICINE

## 2022-08-03 PROCEDURE — 80053 COMPREHEN METABOLIC PANEL: CPT | Performed by: INTERNAL MEDICINE

## 2022-08-03 PROCEDURE — 83036 HEMOGLOBIN GLYCOSYLATED A1C: CPT | Performed by: INTERNAL MEDICINE

## 2022-08-08 ENCOUNTER — OFFICE VISIT (OUTPATIENT)
Dept: FAMILY MEDICINE | Facility: CLINIC | Age: 82
End: 2022-08-08
Payer: MEDICARE

## 2022-08-08 VITALS
WEIGHT: 136.81 LBS | TEMPERATURE: 98 F | BODY MASS INDEX: 24.24 KG/M2 | HEIGHT: 63 IN | DIASTOLIC BLOOD PRESSURE: 62 MMHG | SYSTOLIC BLOOD PRESSURE: 116 MMHG | OXYGEN SATURATION: 97 % | HEART RATE: 55 BPM

## 2022-08-08 DIAGNOSIS — E78.5 HYPERLIPIDEMIA LDL GOAL <100: ICD-10-CM

## 2022-08-08 DIAGNOSIS — M85.80 OSTEOPENIA AFTER MENOPAUSE: ICD-10-CM

## 2022-08-08 DIAGNOSIS — D56.3 ALPHA THALASSEMIA TRAIT: ICD-10-CM

## 2022-08-08 DIAGNOSIS — Z78.0 OSTEOPENIA AFTER MENOPAUSE: ICD-10-CM

## 2022-08-08 DIAGNOSIS — I70.0 ATHEROSCLEROSIS OF ABDOMINAL AORTA: ICD-10-CM

## 2022-08-08 DIAGNOSIS — N25.81 SECONDARY HYPERPARATHYROIDISM OF RENAL ORIGIN: ICD-10-CM

## 2022-08-08 DIAGNOSIS — Z00.00 ROUTINE MEDICAL EXAM: Primary | ICD-10-CM

## 2022-08-08 DIAGNOSIS — G72.0 STATIN MYOPATHY: ICD-10-CM

## 2022-08-08 DIAGNOSIS — T46.6X5A STATIN MYOPATHY: ICD-10-CM

## 2022-08-08 DIAGNOSIS — Z23 NEED FOR TDAP VACCINATION: ICD-10-CM

## 2022-08-08 DIAGNOSIS — N18.30 BENIGN HYPERTENSION WITH CHRONIC KIDNEY DISEASE, STAGE III: ICD-10-CM

## 2022-08-08 DIAGNOSIS — I12.9 BENIGN HYPERTENSION WITH CHRONIC KIDNEY DISEASE, STAGE III: ICD-10-CM

## 2022-08-08 DIAGNOSIS — E55.9 VITAMIN D DEFICIENCY DISEASE: ICD-10-CM

## 2022-08-08 DIAGNOSIS — E11.9 CONTROLLED TYPE 2 DIABETES MELLITUS WITHOUT COMPLICATION, WITHOUT LONG-TERM CURRENT USE OF INSULIN: ICD-10-CM

## 2022-08-08 DIAGNOSIS — Z23 NEED FOR SHINGLES VACCINE: ICD-10-CM

## 2022-08-08 PROCEDURE — 99215 OFFICE O/P EST HI 40 MIN: CPT | Mod: PBBFAC,PO | Performed by: INTERNAL MEDICINE

## 2022-08-08 PROCEDURE — 99999 PR PBB SHADOW E&M-EST. PATIENT-LVL V: ICD-10-PCS | Mod: PBBFAC,,, | Performed by: INTERNAL MEDICINE

## 2022-08-08 PROCEDURE — 99397 PER PM REEVAL EST PAT 65+ YR: CPT | Mod: GZ,S$PBB,, | Performed by: INTERNAL MEDICINE

## 2022-08-08 PROCEDURE — 99999 PR PBB SHADOW E&M-EST. PATIENT-LVL V: CPT | Mod: PBBFAC,,, | Performed by: INTERNAL MEDICINE

## 2022-08-08 PROCEDURE — 99397 PR PREVENTIVE VISIT,EST,65 & OVER: ICD-10-PCS | Mod: GZ,S$PBB,, | Performed by: INTERNAL MEDICINE

## 2022-08-08 NOTE — PROGRESS NOTES
HISTORY OF PRESENT ILLNESS:  Lizbeth Hutchison is a 82 y.o. female who presents to the clinic today for a routine physical exam. Her last physical exam was approximately 1 years(s) ago.        PAST MEDICAL HISTORY:  Past Medical History:   Diagnosis Date    Alpha thalassemia trait     Anxiety     Atherosclerosis of abdominal aorta     noted on CT scan 2016    DDD (degenerative disc disease), lumbar     chronic low back pain    Diverticulosis     History of colonic polyps     last colonoscopy  - normal    Hyperlipidemia LDL goal <100     unable to tolerate statins or Welchol    Hypertension     Osteopenia     no need for medication at this time - last BMD 2010    Overweight     Type II or unspecified type diabetes mellitus without mention of complication, not stated as uncontrolled     Vitamin D deficiency disease     resolved with supplementation       PAST SURGICAL HISTORY:  Past Surgical History:   Procedure Laterality Date    CATARACT EXTRACTION Bilateral 19    samuel      bilateral    HEMORRHOID SURGERY  1970    KELOID EXCISION  ,     abdominal wall    TOTAL ABDOMINAL HYSTERECTOMY      TRIGGER FINGER RELEASE      left hand       SOCIAL HISTORY:  Social History     Socioeconomic History    Marital status:     Number of children: 2   Occupational History    Occupation:    Tobacco Use    Smoking status: Never Smoker    Smokeless tobacco: Never Used   Substance and Sexual Activity    Alcohol use: No       FAMILY HISTORY:  Family History   Adopted: Yes   Problem Relation Age of Onset    Heart failure Mother     Alzheimer's disease Mother     Breast cancer Maternal Grandmother     Other Sister          from too much anesthesia    Congenital heart disease Brother     Arthritis Sister         back problems    Hypertension Sister     Transient ischemic attack Sister     Stroke Brother     Coronary  artery disease Brother         s/p stenting    Stroke Brother     Colon cancer Other     Other Son         had eosinophilic granulomatosis -  shortly after lung transplant    Diabetes Neg Hx        ALLERGIES AND MEDICATIONS: updated and reviewed.  Review of patient's allergies indicates:   Allergen Reactions    Cholesterol analogues Other (See Comments)     Muscle spasam    Clindamycin Hives    Statins-hmg-coa reductase inhibitors Other (See Comments)    Sulfa (sulfonamide antibiotics)     Tramadol Nausea And Vomiting    Welchol [colesevelam] Other (See Comments)    Codeine Rash    Penicillins Rash     Medication List with Changes/Refills   Current Medications    CALCIUM ORAL    Take 1 tablet by mouth once daily.    COLESTIPOL (COLESTID) 1 GRAM TAB    Take 2 tablets (2 g total) by mouth 2 (two) times daily.    CYANOCOBALAMIN (VITAMIN B-12) 1000 MCG TABLET    Take 100 mcg by mouth once daily.    FLUTICASONE (VERAMYST) 27.5 MCG/ACTUATION NASAL SPRAY    2 sprays by Nasal route once daily.    HYDROCHLOROTHIAZIDE (HYDRODIURIL) 25 MG TABLET    Take 1 tablet (25 mg total) by mouth once daily.    LIDOCAINE HCL 2% 2 % JELP WITH CYCLOPENTOLATE 2 % DROP 5 DROP, TROPICAMIDE 1% 1 % DROP 5 DROP, PHENYLEPHRINE HCL 10% 10 % DROP 5 DROP    AS DIRECTED    LISINOPRIL (PRINIVIL,ZESTRIL) 40 MG TABLET    Take 1 tablet (40 mg total) by mouth once daily.    MULTIVITAMIN/IRON/FOLIC ACID (CENTRUM COMPLETE ORAL)    Take 1 tablet by mouth once daily.    OMEGA-3 FATTY ACIDS 1,000 MG CAP    Take by mouth. 1 Capsule Oral Every day    VITAMIN D 185 MG TAB    Take 185 mg by mouth once daily.          CARE TEAM:  Patient Care Team:  Nusrat Cruz MD as PCP - General (Internal Medicine)  Cindy Oscar LPN as Licensed Practical Nurse  Shira Barksdale OD (Ophthalmology)           SCREENING HISTORY:  Health Maintenance       Date Due Completion Date    TETANUS VACCINE Never done ---    Shingles Vaccine (1 of 2) Never done ---  "   Mammogram 07/16/2022 7/16/2021    Influenza Vaccine (1) 09/01/2022 1/29/2022    DEXA Scan 01/19/2023 1/19/2021    Override on 10/11/2011: Done (osteopenia)    Eye Exam 01/24/2023 1/24/2022    Override on 3/18/2015: Done    Override on 3/4/2014: Done    Override on 1/28/2013: Done    Diabetes Urine Screening 01/28/2023 1/28/2022    Lipid Panel 01/28/2023 1/28/2022    Foot Exam 01/29/2023 1/29/2022    Override on 12/2/2020: Done (normal)    Hemoglobin A1c 02/03/2023 8/3/2022            REVIEW OF SYSTEMS:   The patient reports: good dietary habits.  The patient reports : that they exercise regularly - she teaches line dancing.  Review of Systems   Constitutional: Negative for chills, fatigue, fever and unexpected weight change.   HENT: Negative for congestion and postnasal drip.    Eyes: Negative for pain and visual disturbance.   Respiratory: Negative for cough, shortness of breath and wheezing.    Cardiovascular: Negative for chest pain, palpitations and leg swelling.   Gastrointestinal: Negative for abdominal pain, constipation, diarrhea, nausea and vomiting.   Genitourinary: Negative for dysuria.   Musculoskeletal: Negative for arthralgias and back pain.        She had a recent right ankle injury while line dancing. She went to  where X-ray was unremarkable. She has been resting the ankle and it is feeling better. Swelling has pretty much resolved.   Skin: Negative for rash.   Neurological: Negative for weakness and headaches.   Psychiatric/Behavioral: Negative for dysphoric mood and sleep disturbance. The patient is not nervous/anxious.       ROS (Optional)-: no pelvic pain  Breast ROS (Optional)-: negative for breast lumps/discharge          Physical Examination: 274}  Vitals:    08/08/22 0907   BP: 116/62   Pulse: (!) 55   Temp: 97.9 °F (36.6 °C)     Weight: 62.1 kg (136 lb 12.7 oz)   Height: 5' 3" (160 cm)   Body mass index is 24.23 kg/m².      Patient declined to have a chaperone present during the exam " today.    General appearance - alert, well appearing, and in no distress, normal appearing weight  Psychiatric - alert, oriented to person, place, and time, normal behavior, speech, dress, motor activity and thought process  Eyes - pupils equal and reactive, extraocular eye movements intact, sclera anicteric  Mouth - not examined  Neck - supple, no significant adenopathy, carotids upstroke normal bilaterally, no bruits  Lymphatics - no palpable cervical lymphadenopathy  Chest - clear to auscultation, no wheezes, rales or rhonchi, symmetric air entry  Heart - normal rate and regular rhythm, no gallops noted  Abdomen - soft, nontender, nondistended, no masses or organomegaly  Breasts - breasts appear normal, no suspicious masses, no skin or nipple changes or axillary nodes  Neurological - alert, normal speech, no focal findings or movement disorder noted, cranial nerves II through XII intact  Musculoskeletal - patient noted to have Mild osteoarthritic changes to both knee joints. No joint effusions noted., no muscular tenderness noted  Extremities - peripheral pulses normal, no pedal edema, no clubbing or cyanosis  Skin - normal coloration and turgor, no rashes, no suspicious skin lesions noted      Labs:  Lab Results   Component Value Date    HGBA1C 6.1 (H) 08/03/2022    HGBA1C 6.1 (H) 01/28/2022    HGBA1C 5.9 (H) 07/01/2021      Lab Results   Component Value Date    CHOL 244 (H) 01/28/2022    CHOL 242 (H) 11/28/2020    CHOL 228 (H) 04/28/2020     Lab Results   Component Value Date    LDLCALC 170.6 (H) 01/28/2022    LDLCALC 162.6 (H) 11/28/2020    LDLCALC 140.2 04/28/2020         ASSESSMENT AND PLAN:  274}  1. Routine medical exam  Counseled on age appropriate medical preventative services including age appropriate cancer screenings, age appropriate eye and dental exams, over all nutritional health, need for a consistent exercise regimen, and an over all push towards maintaining a vigorous and active lifestyle.   Counseled on age appropriate vaccines and discussed upcoming health care needs based on age/gender. Discussed good sleep hygiene and stress management.    2. Controlled type 2 diabetes mellitus without complication, without long-term current use of insulin  Lab Results   Component Value Date    HGBA1C 6.1 (H) 08/03/2022     Diabetes is under good control at this time for age and comorbid conditions.   We discussed diabetic diet and regular exercise.   We discussed home blood sugar monitoring, if appropriate - the patient does not need to test daily but can test only as needed.   We discussed low sugar/low carbohydrate diet and regular exercise to prevent progression. No need for prescription medication at this time.  Diabetic complications addressed: Not applicable.  Patient was counseled on the need for yearly eye exam to screen for/monitor diabetic retinopathy and yearly diabetic foot exam.  - Hemoglobin A1C; Future  - Microalbumin/Creatinine Ratio, Urine; Future    3. Benign hypertension with chronic kidney disease, stage III  Discussed sodium restriction, maintaining ideal body weight and regular exercise program as physiologic means to achieve blood pressure control. The patient will strive towards this.   The current medical regimen is effective;  continue present plan and medications. Recommended patient to check home readings to monitor and see me for followup as scheduled or sooner as needed.   Patient was educated that both decongestant and anti-inflammatory medication may raise blood pressure.  Stable decreased kidney function. Observe. Patient counseled to avoid/minimize the use of anti-inflammatory  Medication. Discussed to stay well hydrated. Also discussed with patient that good control of blood pressure and/or diabetes, if present, will help to prevent progression.  The patient is not active on the digital hypertension program.  - CBC Auto Differential; Future    4. Hyperlipidemia LDL goal <100/5.  Statin myopathy  Lab Results   Component Value Date    LDLCALC 170.6 (H) 01/28/2022     We discussed low fat diet and regular exercise. Patient is statin intolerant.  - Lipid Panel; Future  - Comprehensive Metabolic Panel; Future    6. Atherosclerosis of abdominal aorta  Patient with Atherosclerosis of the Aorta.  Stable/asymptomatic. Currently stable on lipid and blood pressure monitoring. Statin intolerant.    7. Alpha thalassemia trait  Stable. Asymptomatic. Observe.    8. Secondary hyperparathyroidism of renal origin  Stable. Asymptomatic. Observe.    9. Osteopenia after menopause  We discussed adequate calcium and vitamin D supplementation. We discussed fall precautions. She is up to date on her BMD. No need for prescription medication at this time.    10. Vitamin D deficiency disease  The current medical regimen is effective;  continue present plan and medications.     11. Need for Tdap vaccination  Patient was advised to get immunization at the pharmacy.    12. Need for shingles vaccine  Patient was advised to get immunization at the pharmacy.          Orders Placed This Encounter   Procedures    Hemoglobin A1C    Lipid Panel    Comprehensive Metabolic Panel    CBC Auto Differential    Microalbumin/Creatinine Ratio, Urine      Follow up in about 6 months (around 2/8/2023), or if symptoms worsen or fail to improve, for follow up chronic medical conditions.. or sooner as needed.

## 2022-08-08 NOTE — PATIENT INSTRUCTIONS
Results for orders placed or performed in visit on 08/03/22   Hemoglobin A1C   Result Value Ref Range    Hemoglobin A1C 6.1 (H) 4.0 - 5.6 %    Estimated Avg Glucose 128 68 - 131 mg/dL   Comprehensive Metabolic Panel   Result Value Ref Range    Sodium 138 136 - 145 mmol/L    Potassium 4.3 3.5 - 5.1 mmol/L    Chloride 103 95 - 110 mmol/L    CO2 28 23 - 29 mmol/L    Glucose 99 70 - 110 mg/dL    BUN 20 8 - 23 mg/dL    Creatinine 1.7 (H) 0.5 - 1.4 mg/dL    Calcium 9.4 8.7 - 10.5 mg/dL    Total Protein 7.3 6.0 - 8.4 g/dL    Albumin 3.7 3.5 - 5.2 g/dL    Total Bilirubin 0.5 0.1 - 1.0 mg/dL    Alkaline Phosphatase 65 55 - 135 U/L    AST 21 10 - 40 U/L    ALT 21 10 - 44 U/L    Anion Gap 7 (L) 8 - 16 mmol/L    eGFR 29.8 (A) >60 mL/min/1.73 m^2

## 2022-08-11 ENCOUNTER — HOSPITAL ENCOUNTER (OUTPATIENT)
Dept: RADIOLOGY | Facility: HOSPITAL | Age: 82
Discharge: HOME | End: 2022-08-11
Attending: INTERNAL MEDICINE
Payer: MEDICARE

## 2022-08-11 DIAGNOSIS — Z12.31 BREAST CANCER SCREENING BY MAMMOGRAM: ICD-10-CM

## 2022-08-11 PROCEDURE — 77063 MAMMO DIGITAL SCREENING BILAT WITH TOMO: ICD-10-PCS | Mod: 26,,, | Performed by: RADIOLOGY

## 2022-08-11 PROCEDURE — 77067 SCR MAMMO BI INCL CAD: CPT | Mod: 26,,, | Performed by: RADIOLOGY

## 2022-08-11 PROCEDURE — 77063 BREAST TOMOSYNTHESIS BI: CPT | Mod: TC,PO

## 2022-08-11 PROCEDURE — 77063 BREAST TOMOSYNTHESIS BI: CPT | Mod: 26,,, | Performed by: RADIOLOGY

## 2022-08-11 PROCEDURE — 77067 MAMMO DIGITAL SCREENING BILAT WITH TOMO: ICD-10-PCS | Mod: 26,,, | Performed by: RADIOLOGY

## 2022-08-11 PROCEDURE — 77067 SCR MAMMO BI INCL CAD: CPT | Mod: TC,PO

## 2022-09-30 ENCOUNTER — PATIENT MESSAGE (OUTPATIENT)
Dept: FAMILY MEDICINE | Facility: CLINIC | Age: 82
End: 2022-09-30
Payer: MEDICARE

## 2022-12-20 ENCOUNTER — TELEPHONE (OUTPATIENT)
Dept: FAMILY MEDICINE | Facility: CLINIC | Age: 82
End: 2022-12-20
Payer: MEDICARE

## 2022-12-20 DIAGNOSIS — Z23 NEEDS FLU SHOT: Primary | ICD-10-CM

## 2022-12-20 NOTE — TELEPHONE ENCOUNTER
LVM with patient to return call to clinic, patient needs to be schedule for flu vaccine ASAP, order is already placed and deferred for future date

## 2022-12-20 NOTE — TELEPHONE ENCOUNTER
Please call patient and schedule flu shot now - I do not want her to wait until office visit in February next year.

## 2023-02-01 ENCOUNTER — LAB VISIT (OUTPATIENT)
Dept: LAB | Facility: HOSPITAL | Age: 83
End: 2023-02-01
Attending: INTERNAL MEDICINE
Payer: COMMERCIAL

## 2023-02-01 DIAGNOSIS — E78.5 HYPERLIPIDEMIA LDL GOAL <100: ICD-10-CM

## 2023-02-01 DIAGNOSIS — N18.30 BENIGN HYPERTENSION WITH CHRONIC KIDNEY DISEASE, STAGE III: ICD-10-CM

## 2023-02-01 DIAGNOSIS — I12.9 BENIGN HYPERTENSION WITH CHRONIC KIDNEY DISEASE, STAGE III: ICD-10-CM

## 2023-02-01 DIAGNOSIS — E11.9 CONTROLLED TYPE 2 DIABETES MELLITUS WITHOUT COMPLICATION, WITHOUT LONG-TERM CURRENT USE OF INSULIN: ICD-10-CM

## 2023-02-01 LAB
ALBUMIN SERPL BCP-MCNC: 4.1 G/DL (ref 3.5–5.2)
ALP SERPL-CCNC: 68 U/L (ref 55–135)
ALT SERPL W/O P-5'-P-CCNC: 11 U/L (ref 10–44)
ANION GAP SERPL CALC-SCNC: 10 MMOL/L (ref 8–16)
AST SERPL-CCNC: 13 U/L (ref 10–40)
BASOPHILS # BLD AUTO: 0.04 K/UL (ref 0–0.2)
BASOPHILS NFR BLD: 0.7 % (ref 0–1.9)
BILIRUB SERPL-MCNC: 0.6 MG/DL (ref 0.1–1)
BUN SERPL-MCNC: 23 MG/DL (ref 8–23)
CALCIUM SERPL-MCNC: 9.5 MG/DL (ref 8.7–10.5)
CHLORIDE SERPL-SCNC: 106 MMOL/L (ref 95–110)
CHOLEST SERPL-MCNC: 242 MG/DL (ref 120–199)
CHOLEST/HDLC SERPL: 5.4 {RATIO} (ref 2–5)
CO2 SERPL-SCNC: 29 MMOL/L (ref 23–29)
CREAT SERPL-MCNC: 1.7 MG/DL (ref 0.5–1.4)
DIFFERENTIAL METHOD: ABNORMAL
EOSINOPHIL # BLD AUTO: 0.1 K/UL (ref 0–0.5)
EOSINOPHIL NFR BLD: 1.2 % (ref 0–8)
ERYTHROCYTE [DISTWIDTH] IN BLOOD BY AUTOMATED COUNT: 17.2 % (ref 11.5–14.5)
EST. GFR  (NO RACE VARIABLE): 29.8 ML/MIN/1.73 M^2
ESTIMATED AVG GLUCOSE: 128 MG/DL (ref 68–131)
GLUCOSE SERPL-MCNC: 89 MG/DL (ref 70–110)
HBA1C MFR BLD: 6.1 % (ref 4–5.6)
HCT VFR BLD AUTO: 47.7 % (ref 37–48.5)
HDLC SERPL-MCNC: 45 MG/DL (ref 40–75)
HDLC SERPL: 18.6 % (ref 20–50)
HGB BLD-MCNC: 13.4 G/DL (ref 12–16)
IMM GRANULOCYTES # BLD AUTO: 0.02 K/UL (ref 0–0.04)
IMM GRANULOCYTES NFR BLD AUTO: 0.4 % (ref 0–0.5)
LDLC SERPL CALC-MCNC: 170.8 MG/DL (ref 63–159)
LYMPHOCYTES # BLD AUTO: 1.8 K/UL (ref 1–4.8)
LYMPHOCYTES NFR BLD: 32.3 % (ref 18–48)
MCH RBC QN AUTO: 22.2 PG (ref 27–31)
MCHC RBC AUTO-ENTMCNC: 28.1 G/DL (ref 32–36)
MCV RBC AUTO: 79 FL (ref 82–98)
MONOCYTES # BLD AUTO: 0.4 K/UL (ref 0.3–1)
MONOCYTES NFR BLD: 6.3 % (ref 4–15)
NEUTROPHILS # BLD AUTO: 3.4 K/UL (ref 1.8–7.7)
NEUTROPHILS NFR BLD: 59.1 % (ref 38–73)
NONHDLC SERPL-MCNC: 197 MG/DL
NRBC BLD-RTO: 0 /100 WBC
PLATELET # BLD AUTO: 190 K/UL (ref 150–450)
PMV BLD AUTO: 13.6 FL (ref 9.2–12.9)
POTASSIUM SERPL-SCNC: 3.7 MMOL/L (ref 3.5–5.1)
PROT SERPL-MCNC: 7.7 G/DL (ref 6–8.4)
RBC # BLD AUTO: 6.04 M/UL (ref 4–5.4)
SODIUM SERPL-SCNC: 145 MMOL/L (ref 136–145)
TRIGL SERPL-MCNC: 131 MG/DL (ref 30–150)
WBC # BLD AUTO: 5.69 K/UL (ref 3.9–12.7)

## 2023-02-01 PROCEDURE — 85025 COMPLETE CBC W/AUTO DIFF WBC: CPT | Performed by: INTERNAL MEDICINE

## 2023-02-01 PROCEDURE — 36415 COLL VENOUS BLD VENIPUNCTURE: CPT | Mod: PO | Performed by: INTERNAL MEDICINE

## 2023-02-01 PROCEDURE — 83036 HEMOGLOBIN GLYCOSYLATED A1C: CPT | Performed by: INTERNAL MEDICINE

## 2023-02-01 PROCEDURE — 80053 COMPREHEN METABOLIC PANEL: CPT | Performed by: INTERNAL MEDICINE

## 2023-02-01 PROCEDURE — 80061 LIPID PANEL: CPT | Performed by: INTERNAL MEDICINE

## 2023-02-08 ENCOUNTER — OFFICE VISIT (OUTPATIENT)
Dept: FAMILY MEDICINE | Facility: CLINIC | Age: 83
End: 2023-02-08
Payer: MEDICARE

## 2023-02-08 VITALS
HEART RATE: 56 BPM | BODY MASS INDEX: 23.4 KG/M2 | WEIGHT: 132.06 LBS | HEIGHT: 63 IN | OXYGEN SATURATION: 99 % | TEMPERATURE: 97 F | DIASTOLIC BLOOD PRESSURE: 58 MMHG | SYSTOLIC BLOOD PRESSURE: 110 MMHG

## 2023-02-08 DIAGNOSIS — E11.9 CONTROLLED TYPE 2 DIABETES MELLITUS WITHOUT COMPLICATION, WITHOUT LONG-TERM CURRENT USE OF INSULIN: Primary | ICD-10-CM

## 2023-02-08 DIAGNOSIS — Z23 NEED FOR TDAP VACCINATION: ICD-10-CM

## 2023-02-08 DIAGNOSIS — G72.0 STATIN MYOPATHY: ICD-10-CM

## 2023-02-08 DIAGNOSIS — I70.0 ATHEROSCLEROSIS OF ABDOMINAL AORTA: ICD-10-CM

## 2023-02-08 DIAGNOSIS — T46.6X5A STATIN MYOPATHY: ICD-10-CM

## 2023-02-08 DIAGNOSIS — I12.9 BENIGN HYPERTENSION WITH CHRONIC KIDNEY DISEASE, STAGE IV: ICD-10-CM

## 2023-02-08 DIAGNOSIS — E55.9 VITAMIN D DEFICIENCY DISEASE: ICD-10-CM

## 2023-02-08 DIAGNOSIS — N18.4 BENIGN HYPERTENSION WITH CHRONIC KIDNEY DISEASE, STAGE IV: ICD-10-CM

## 2023-02-08 DIAGNOSIS — M85.80 OSTEOPENIA AFTER MENOPAUSE: ICD-10-CM

## 2023-02-08 DIAGNOSIS — N25.81 SECONDARY HYPERPARATHYROIDISM OF RENAL ORIGIN: ICD-10-CM

## 2023-02-08 DIAGNOSIS — Z23 FLU VACCINE NEED: ICD-10-CM

## 2023-02-08 DIAGNOSIS — Z23 NEED FOR SHINGLES VACCINE: ICD-10-CM

## 2023-02-08 DIAGNOSIS — E78.5 HYPERLIPIDEMIA LDL GOAL <100: ICD-10-CM

## 2023-02-08 DIAGNOSIS — D56.3 ALPHA THALASSEMIA TRAIT: ICD-10-CM

## 2023-02-08 DIAGNOSIS — Z78.0 OSTEOPENIA AFTER MENOPAUSE: ICD-10-CM

## 2023-02-08 PROCEDURE — 99999 PR PBB SHADOW E&M-EST. PATIENT-LVL IV: ICD-10-PCS | Mod: PBBFAC,,, | Performed by: INTERNAL MEDICINE

## 2023-02-08 PROCEDURE — 99214 OFFICE O/P EST MOD 30 MIN: CPT | Mod: PBBFAC,PO | Performed by: INTERNAL MEDICINE

## 2023-02-08 PROCEDURE — 99214 PR OFFICE/OUTPT VISIT, EST, LEVL IV, 30-39 MIN: ICD-10-PCS | Mod: S$PBB,,, | Performed by: INTERNAL MEDICINE

## 2023-02-08 PROCEDURE — 99214 OFFICE O/P EST MOD 30 MIN: CPT | Mod: S$PBB,,, | Performed by: INTERNAL MEDICINE

## 2023-02-08 PROCEDURE — 99999 PR PBB SHADOW E&M-EST. PATIENT-LVL IV: CPT | Mod: PBBFAC,,, | Performed by: INTERNAL MEDICINE

## 2023-02-08 PROCEDURE — G0008 ADMIN INFLUENZA VIRUS VAC: HCPCS | Mod: PBBFAC,PO

## 2023-02-08 NOTE — PROGRESS NOTES
274}  HISTORY OF PRESENT ILLNESS:  Lizbeth Hutchison is a 82 y.o. female who presents to the clinic today for Annual Exam  .     The patient presents to clinic today for follow-up of her diet-controlled diabetes, hypertension complicated by chronic kidney disease stage 4, and hyperlipidemia.  The patient reports that overall she has been okay.  She does report to me that her daughter passed away recently from stiff man syndrome.  The last few months have been very hectic with her daughters illness.  She is trying to take care of financial matters.  She reports a lot of good support from friends and family.      PAST MEDICAL HISTORY:  Past Medical History:   Diagnosis Date    Alpha thalassemia trait     Anxiety     Atherosclerosis of abdominal aorta     noted on CT scan 4/6/2016    DDD (degenerative disc disease), lumbar     chronic low back pain    Diverticulosis     History of colonic polyps     last colonoscopy 2011 - normal    Hyperlipidemia LDL goal <100     unable to tolerate statins or Welchol    Hypertension     Osteopenia     no need for medication at this time - last BMD October 2010    Overweight     Type II or unspecified type diabetes mellitus without mention of complication, not stated as uncontrolled     Vitamin D deficiency disease     resolved with supplementation       PAST SURGICAL HISTORY:  Past Surgical History:   Procedure Laterality Date    CATARACT EXTRACTION Bilateral 6/25/19 8/23/19    samuel  1994    bilateral    HEMORRHOID SURGERY  1970    KELOID EXCISION  1974, 1976    abdominal wall    TOTAL ABDOMINAL HYSTERECTOMY  1972    TRIGGER FINGER RELEASE  2003    left hand       SOCIAL HISTORY:  Social History     Socioeconomic History    Marital status:     Number of children: 2   Occupational History    Occupation:    Tobacco Use    Smoking status: Never    Smokeless tobacco: Never   Substance and Sexual Activity    Alcohol use: No       FAMILY  HISTORY:  Family History   Adopted: Yes   Problem Relation Age of Onset    Heart failure Mother     Alzheimer's disease Mother     Other Sister          from too much anesthesia    Arthritis Sister         back problems    Hypertension Sister     Transient ischemic attack Sister     Congenital heart disease Brother     Stroke Brother     Coronary artery disease Brother         s/p stenting    Stroke Brother     Breast cancer Maternal Grandmother     Other Son         had eosinophilic granulomatosis -  shortly after lung transplant    Colon cancer Other     Other Daughter         stiffman syndrome    Diabetes Neg Hx        ALLERGIES AND MEDICATIONS: updated and reviewed.  Review of patient's allergies indicates:   Allergen Reactions    Cholesterol analogues Other (See Comments)     Muscle spasam    Clindamycin Hives    Statins-hmg-coa reductase inhibitors Other (See Comments)    Sulfa (sulfonamide antibiotics)     Tramadol Nausea And Vomiting    Welchol [colesevelam] Other (See Comments)    Codeine Rash    Penicillins Rash     Medication List with Changes/Refills   Current Medications    CALCIUM ORAL    Take 1 tablet by mouth once daily.    COLESTIPOL (COLESTID) 1 GRAM TAB    Take 2 tablets (2 g total) by mouth 2 (two) times daily.    CYANOCOBALAMIN (VITAMIN B-12) 1000 MCG TABLET    Take 100 mcg by mouth once daily.    FLUTICASONE (VERAMYST) 27.5 MCG/ACTUATION NASAL SPRAY    2 sprays by Nasal route once daily.    HYDROCHLOROTHIAZIDE (HYDRODIURIL) 25 MG TABLET    TAKE 1 TABLET BY MOUTH EVERY DAY    LIDOCAINE HCL 2% 2 % JELP WITH CYCLOPENTOLATE 2 % DROP 5 DROP, TROPICAMIDE 1% 1 % DROP 5 DROP, PHENYLEPHRINE HCL 10% 10 % DROP 5 DROP    AS DIRECTED    LISINOPRIL (PRINIVIL,ZESTRIL) 40 MG TABLET    TAKE 1 TABLET BY MOUTH EVERY DAY    MULTIVITAMIN/IRON/FOLIC ACID (CENTRUM COMPLETE ORAL)    Take 1 tablet by mouth once daily.    OMEGA-3 FATTY ACIDS 1,000 MG CAP    Take by mouth. 1 Capsule Oral Every day    VITAMIN D  "185 MG TAB    Take 185 mg by mouth once daily.         CARE TEAM:  Patient Care Team:  Nusrat Cruz MD as PCP - General (Internal Medicine)  Cindy Oscar LPN as Licensed Practical Nurse  Shira Barksdale OD (Ophthalmology)         REVIEW OF SYSTEMS:  Review of Systems   Constitutional:  Negative for chills, fatigue, fever and unexpected weight change.   HENT:  Negative for congestion and postnasal drip.    Eyes:  Negative for pain and visual disturbance.   Respiratory:  Negative for cough, shortness of breath and wheezing.    Cardiovascular:  Negative for chest pain, palpitations and leg swelling.   Gastrointestinal:  Negative for abdominal pain, constipation, diarrhea, nausea and vomiting.   Genitourinary:  Negative for dysuria.   Musculoskeletal:  Negative for arthralgias and back pain.   Skin:  Negative for rash.   Neurological:  Negative for weakness and headaches.   Psychiatric/Behavioral:  Negative for dysphoric mood and sleep disturbance. The patient is not nervous/anxious.        PHYSICAL EXAM: 274}  Vitals:    02/08/23 1024   BP: (!) 110/58   Pulse: (!) 56   Temp: 97.4 °F (36.3 °C)     Weight: 59.9 kg (132 lb 0.9 oz)   Height: 5' 3" (160 cm)   Body mass index is 23.39 kg/m².      General appearance - alert, well appearing, and in no distress, normal appearing weight  Psychiatric - alert, oriented to person, place, and time, normal behavior, speech, dress, motor activity and thought process  Eyes - pupils equal and reactive, extraocular eye movements intact, sclera anicteric  Mouth - not examined; patient wearing mask due to Covid 19 pandemic  Neck - supple, no significant adenopathy, carotids upstroke normal bilaterally, no bruits  Lymphatics - no palpable cervical lymphadenopathy  Chest - clear to auscultation, no wheezes, rales or rhonchi, symmetric air entry  Heart - normal rate and regular rhythm, no gallops noted  Neurological - alert, normal speech, no focal findings; cranial nerves II " through XII intact  Musculoskeletal - patient noted to have Mild-Moderate osteoarthritic changes to both knee joints. No joint effusions noted.  Extremities - no pedal edema noted  Skin - normal coloration, no suspicious skin lesions      Labs:  Lab Results   Component Value Date    HGBA1C 6.1 (H) 02/01/2023    HGBA1C 6.1 (H) 08/03/2022    HGBA1C 6.1 (H) 01/28/2022      Lab Results   Component Value Date    CHOL 242 (H) 02/01/2023    CHOL 244 (H) 01/28/2022    CHOL 242 (H) 11/28/2020     Lab Results   Component Value Date    LDLCALC 170.8 (H) 02/01/2023    LDLCALC 170.6 (H) 01/28/2022    LDLCALC 162.6 (H) 11/28/2020         ASSESSMENT AND PLAN:  274}  1. Controlled type 2 diabetes mellitus without complication, without long-term current use of insulin  Lab Results   Component Value Date    HGBA1C 6.1 (H) 02/01/2023     Diabetes is under good control at this time for age and comorbid conditions.   We discussed diabetic diet and regular exercise.   We discussed home blood sugar monitoring, if appropriate - the patient does not need to test daily but can test only as needed.   We discussed low sugar/low carbohydrate diet and regular exercise to prevent progression. No need for prescription medication at this time. Recheck A1c in 6 months.  Diabetic complications addressed: Not applicable.  Patient was counseled on the need for yearly eye exam to screen for/monitor diabetic retinopathy and yearly diabetic foot exam.  Overview:  - diet controlled.      2. Benign hypertension with chronic kidney disease, stage IV  BP Readings from Last 1 Encounters:   02/08/23 (!) 110/58      Discussed sodium restriction, maintaining ideal body weight and regular exercise program as physiologic means to achieve blood pressure control. The patient will strive towards this.   The current medical regimen is effective;  continue present plan and medications. Recommended patient to check home readings to monitor and see me for followup as  scheduled or sooner as needed.   Patient was educated that both decongestant and anti-inflammatory medication may raise blood pressure.  Stable decreased kidney function. Observe. Patient counseled to avoid/minimize the use of anti-inflammatory  Medication. Discussed to stay well hydrated. Also discussed with patient that good control of blood pressure and/or diabetes, if present, will help to prevent progression.  The patient is not active on the digital hypertension program.    3. Hyperlipidemia LDL goal <100/5. Statin myopathy  Lab Results   Component Value Date    CHOL 242 (H) 02/01/2023     Lab Results   Component Value Date    HDL 45 02/01/2023     Lab Results   Component Value Date    LDLCALC 170.8 (H) 02/01/2023     Lab Results   Component Value Date    TRIG 131 02/01/2023     Lab Results   Component Value Date    LDLCALC 170.8 (H) 02/01/2023     We discussed low fat diet and regular exercise. Patient is statin intolerant.  Overview:  unable to tolerate statins or Welchol      4. Atherosclerosis of abdominal aorta  Patient with Atherosclerosis of the Aorta.  Stable/asymptomatic. Currently stable on lipid and blood pressure monitoring. Statin intolerant.  Overview:  noted on CT scan 4/6/2016        6. Alpha thalassemia trait  Stable. Asymptomatic. Observe.    7. Secondary hyperparathyroidism of renal origin  Stable. Asymptomatic. Observe.    8. Osteopenia after menopause/9. Vitamin D deficiency disease  We discussed adequate calcium and vitamin D supplementation. We discussed fall precautions. She is planning to call back to schedule her BMD. No need for prescription medication at this time. Further treatment plan will be based on results of bone density testing.  Overview:  no need for medication at this time - last BMD March 2016  BMD 1/2021 - No need for Rx tx at this time.         10. Flu vaccine need    -     Influenza (FLUAD) - Quadrivalent (Adjuvanted) *Preferred* (65+) (PF)    11. Need for Tdap  vaccination  Patient was advised to get immunization at the pharmacy.    12. Need for shingles vaccine  Patient was advised to get immunization at the pharmacy.            Orders Placed This Encounter   Procedures    Influenza (FLUAD) - Quadrivalent (Adjuvanted) *Preferred* (65+) (PF)      Follow up in about 6 months (around 8/8/2023), or if symptoms worsen or fail to improve, for annual exam. or sooner as needed.

## 2023-05-05 DIAGNOSIS — E78.5 HYPERLIPIDEMIA LDL GOAL <100: ICD-10-CM

## 2023-05-05 RX ORDER — MONTELUKAST SODIUM 4 MG/1
2 TABLET, CHEWABLE ORAL 2 TIMES DAILY
Qty: 360 TABLET | Refills: 1 | Status: SHIPPED | OUTPATIENT
Start: 2023-05-05 | End: 2023-08-09 | Stop reason: SDUPTHER

## 2023-05-05 NOTE — TELEPHONE ENCOUNTER
No care due was identified.  [facility] Embedded Care Due Messages. Reference number: [id number].   5/05/2023 3:43:37 PM CDT

## 2023-05-05 NOTE — TELEPHONE ENCOUNTER
No care due was identified.  [facility] Embedded Care Due Messages. Reference number: [id number].   5/05/2023 3:55:32 PM CDT

## 2023-05-06 RX ORDER — MONTELUKAST SODIUM 4 MG/1
TABLET, CHEWABLE ORAL
Qty: 360 TABLET | Refills: 1 | OUTPATIENT
Start: 2023-05-06

## 2023-05-06 NOTE — TELEPHONE ENCOUNTER
Alexi DC. Request already responded to by other means (e.g. phone or fax)   Refill Authorization Note   Lizbeth Hutchison  is requesting a refill authorization.  Brief Assessment and Rationale for Refill:  Quick Discontinue  Medication Therapy Plan:  Receipt confirmed by pharmacy (5/5/2023  6:39 PM CDT)    Medication Reconciliation Completed:  No      Comments:     Note composed:4:06 AM 05/06/2023

## 2023-05-26 ENCOUNTER — PES CALL (OUTPATIENT)
Dept: ADMINISTRATIVE | Facility: CLINIC | Age: 83
End: 2023-05-26
Payer: MEDICARE

## 2023-06-30 DIAGNOSIS — I12.9 BENIGN HYPERTENSION WITH CHRONIC KIDNEY DISEASE, STAGE III: ICD-10-CM

## 2023-06-30 DIAGNOSIS — N18.30 BENIGN HYPERTENSION WITH CHRONIC KIDNEY DISEASE, STAGE III: ICD-10-CM

## 2023-06-30 RX ORDER — HYDROCHLOROTHIAZIDE 25 MG/1
25 TABLET ORAL DAILY
Qty: 90 TABLET | Refills: 2 | Status: SHIPPED | OUTPATIENT
Start: 2023-06-30 | End: 2023-08-09 | Stop reason: SDUPTHER

## 2023-06-30 RX ORDER — LISINOPRIL 40 MG/1
40 TABLET ORAL DAILY
Qty: 90 TABLET | Refills: 2 | Status: SHIPPED | OUTPATIENT
Start: 2023-06-30 | End: 2023-08-09 | Stop reason: SDUPTHER

## 2023-06-30 NOTE — TELEPHONE ENCOUNTER
No care due was identified.  [facility] Embedded Care Due Messages. Reference number: [id number].   6/30/2023 3:49:32 AM CDT

## 2023-06-30 NOTE — TELEPHONE ENCOUNTER
Refill Routing Note   Medication(s) are not appropriate for processing by [facility] for the following reason(s):      Required labs abnormal:  Cr    [facility] action(s):  Defer Care Due:  None identified        Pharmacist review requested: Yes     Appointments  past 12m or future 3m with PCP    Date Provider   Last Visit   2/8/2023 Nusrat Cruz MD   Next Visit   8/9/2023 Nusrat Cruz MD   ED visits in past 90 days: 0        Note composed:9:47 AM 06/30/2023

## 2023-06-30 NOTE — TELEPHONE ENCOUNTER
Refill Decision Note   Lizbeth Hutchison  is requesting a refill authorization.  Brief Assessment and Rationale for Refill:  Approve     Medication Therapy Plan:  No dose adjustment recommended per renal fxn.      Alert overridden per protocol: Yes   Pharmacist review requested: Yes   Comments:     No Care Gaps recommended.     Note composed:10:17 AM 06/30/2023

## 2023-07-12 LAB
LEFT EYE DM RETINOPATHY: NEGATIVE
RIGHT EYE DM RETINOPATHY: NEGATIVE

## 2023-08-03 ENCOUNTER — TELEPHONE (OUTPATIENT)
Dept: FAMILY MEDICINE | Facility: CLINIC | Age: 83
End: 2023-08-03
Payer: MEDICARE

## 2023-08-03 DIAGNOSIS — M85.80 OSTEOPENIA AFTER MENOPAUSE: Primary | ICD-10-CM

## 2023-08-03 DIAGNOSIS — Z78.0 OSTEOPENIA AFTER MENOPAUSE: Primary | ICD-10-CM

## 2023-08-03 DIAGNOSIS — E78.5 HYPERLIPIDEMIA LDL GOAL <100: ICD-10-CM

## 2023-08-03 NOTE — TELEPHONE ENCOUNTER
Attempted to contact patient. Left a voice mail to call clinic back. Patient overdue for lab and BMD.    done

## 2023-08-03 NOTE — TELEPHONE ENCOUNTER
----- Message from Bina Cardozo sent at 8/3/2023  2:05 PM CDT -----  .Type: Patient Call Back    Who called:self     What is the request in detail: patient is wanting to complete blood work Saturday , states she needs to know if it will be a fasting or non fasting lab , wants to know if orders are in and if they include all of the bloodwork that she needs    Can the clinic reply by [facility]? call    Would the patient rather a call back or a response via [facility]? Call     Best call back number: [phone]     Additional Information:

## 2023-08-07 ENCOUNTER — LAB VISIT (OUTPATIENT)
Dept: LAB | Facility: HOSPITAL | Age: 83
End: 2023-08-07
Attending: INTERNAL MEDICINE
Payer: MEDICARE

## 2023-08-07 DIAGNOSIS — E78.5 HYPERLIPIDEMIA LDL GOAL <100: ICD-10-CM

## 2023-08-07 LAB
ESTIMATED AVG GLUCOSE: 123 MG/DL (ref 68–131)
HBA1C MFR BLD: 5.9 % (ref 4–5.6)

## 2023-08-07 PROCEDURE — 36415 COLL VENOUS BLD VENIPUNCTURE: CPT | Mod: PO | Performed by: INTERNAL MEDICINE

## 2023-08-07 PROCEDURE — 83036 HEMOGLOBIN GLYCOSYLATED A1C: CPT | Performed by: INTERNAL MEDICINE

## 2023-08-09 ENCOUNTER — OFFICE VISIT (OUTPATIENT)
Dept: FAMILY MEDICINE | Facility: CLINIC | Age: 83
End: 2023-08-09
Payer: MEDICARE

## 2023-08-09 ENCOUNTER — PATIENT OUTREACH (OUTPATIENT)
Dept: ADMINISTRATIVE | Facility: HOSPITAL | Age: 83
End: 2023-08-09
Payer: MEDICARE

## 2023-08-09 VITALS
HEART RATE: 54 BPM | SYSTOLIC BLOOD PRESSURE: 110 MMHG | TEMPERATURE: 98 F | DIASTOLIC BLOOD PRESSURE: 56 MMHG | BODY MASS INDEX: 24.06 KG/M2 | WEIGHT: 135.81 LBS | OXYGEN SATURATION: 94 % | HEIGHT: 63 IN

## 2023-08-09 DIAGNOSIS — D56.3 ALPHA THALASSEMIA TRAIT: ICD-10-CM

## 2023-08-09 DIAGNOSIS — Z00.00 ROUTINE MEDICAL EXAM: Primary | ICD-10-CM

## 2023-08-09 DIAGNOSIS — G72.0 STATIN MYOPATHY: ICD-10-CM

## 2023-08-09 DIAGNOSIS — Z12.31 ENCOUNTER FOR SCREENING MAMMOGRAM FOR BREAST CANCER: ICD-10-CM

## 2023-08-09 DIAGNOSIS — Z78.0 OSTEOPENIA AFTER MENOPAUSE: ICD-10-CM

## 2023-08-09 DIAGNOSIS — N18.4 BENIGN HYPERTENSION WITH CHRONIC KIDNEY DISEASE, STAGE IV: ICD-10-CM

## 2023-08-09 DIAGNOSIS — E78.5 HYPERLIPIDEMIA LDL GOAL <100: ICD-10-CM

## 2023-08-09 DIAGNOSIS — E11.9 CONTROLLED TYPE 2 DIABETES MELLITUS WITHOUT COMPLICATION, WITHOUT LONG-TERM CURRENT USE OF INSULIN: ICD-10-CM

## 2023-08-09 DIAGNOSIS — Z23 NEED FOR TDAP VACCINATION: ICD-10-CM

## 2023-08-09 DIAGNOSIS — Z23 NEED FOR SHINGLES VACCINE: ICD-10-CM

## 2023-08-09 DIAGNOSIS — M85.80 OSTEOPENIA AFTER MENOPAUSE: ICD-10-CM

## 2023-08-09 DIAGNOSIS — N25.81 SECONDARY HYPERPARATHYROIDISM OF RENAL ORIGIN: ICD-10-CM

## 2023-08-09 DIAGNOSIS — T46.6X5A STATIN MYOPATHY: ICD-10-CM

## 2023-08-09 DIAGNOSIS — I70.0 ATHEROSCLEROSIS OF ABDOMINAL AORTA: ICD-10-CM

## 2023-08-09 DIAGNOSIS — I12.9 BENIGN HYPERTENSION WITH CHRONIC KIDNEY DISEASE, STAGE IV: ICD-10-CM

## 2023-08-09 PROCEDURE — 99397 PR PREVENTIVE VISIT,EST,65 & OVER: ICD-10-PCS | Mod: S$PBB,,, | Performed by: INTERNAL MEDICINE

## 2023-08-09 PROCEDURE — 99397 PER PM REEVAL EST PAT 65+ YR: CPT | Mod: S$PBB,,, | Performed by: INTERNAL MEDICINE

## 2023-08-09 PROCEDURE — 99999 PR PBB SHADOW E&M-EST. PATIENT-LVL IV: CPT | Mod: PBBFAC,,, | Performed by: INTERNAL MEDICINE

## 2023-08-09 PROCEDURE — 99999 PR PBB SHADOW E&M-EST. PATIENT-LVL IV: ICD-10-PCS | Mod: PBBFAC,,, | Performed by: INTERNAL MEDICINE

## 2023-08-09 PROCEDURE — 99214 OFFICE O/P EST MOD 30 MIN: CPT | Mod: PBBFAC,PO | Performed by: INTERNAL MEDICINE

## 2023-08-09 RX ORDER — LISINOPRIL 40 MG/1
40 TABLET ORAL DAILY
Qty: 90 TABLET | Refills: 1 | Status: SHIPPED | OUTPATIENT
Start: 2023-08-09 | End: 2024-02-12 | Stop reason: SDUPTHER

## 2023-08-09 RX ORDER — MONTELUKAST SODIUM 4 MG/1
2 TABLET, CHEWABLE ORAL 2 TIMES DAILY
Qty: 360 TABLET | Refills: 1 | Status: SHIPPED | OUTPATIENT
Start: 2023-08-09

## 2023-08-09 RX ORDER — HYDROCHLOROTHIAZIDE 25 MG/1
25 TABLET ORAL DAILY
Qty: 90 TABLET | Refills: 1 | Status: SHIPPED | OUTPATIENT
Start: 2023-08-09 | End: 2024-02-12 | Stop reason: SDUPTHER

## 2023-08-09 NOTE — LETTER
AUTHORIZATION FOR RELEASE OF   CONFIDENTIAL INFORMATION    Dear Dr. Shira Barksdale,    We are seeing Lizbeth Hutchison, date of birth 1940, in the clinic at [facility]/ [facility]/ PEDS. Nusrat Cruz MD is the patient's PCP. Lizbeth Hutchison has an outstanding lab/procedure at the time we reviewed her chart. In order to help keep her health information updated, she has authorized us to request the following medical record(s):        (  )  MAMMOGRAM                                      (  )  COLONOSCOPY      (  )  PAP SMEAR                                          (  )  OUTSIDE LAB RESULTS     (  )  DEXA SCAN                                          ( x ) EYE EXAM(diabetic)             (  )  FOOT EXAM                                          (  )  ENTIRE RECORD     (  )  OUTSIDE IMMUNIZATIONS                 (  )  _______________         Please fax records to [facility], [place], Laura A., MD,  [phone].     If you have any questions, please contact Cindy Oscar LPN [facility] at   [phone].     Patient Name: Lizbeth Hutchison  : 1940  Patient Phone #: [phone]

## 2023-08-09 NOTE — PROGRESS NOTES
CHIEF COMPLAINT:   Chief Complaint   Patient presents with    Annual Exam          HISTORY OF PRESENT ILLNESS:  Lizbeth Hutchison is a 83 y.o. female who presents to the clinic today for a routine physical exam. Her last physical exam was approximately 1 years(s) ago.    Objective     PAST MEDICAL HISTORY:  Past Medical History:   Diagnosis Date    Alpha thalassemia trait     Anxiety     Atherosclerosis of abdominal aorta     noted on CT scan 2016    DDD (degenerative disc disease), lumbar     chronic low back pain    Diverticulosis     History of colonic polyps     last colonoscopy  - normal    Hyperlipidemia LDL goal <100     unable to tolerate statins or Welchol    Hypertension     Osteopenia     no need for medication at this time - last BMD 2010    Overweight     Type II or unspecified type diabetes mellitus without mention of complication, not stated as uncontrolled     Vitamin D deficiency disease     resolved with supplementation       PAST SURGICAL HISTORY:  Past Surgical History:   Procedure Laterality Date    CATARACT EXTRACTION Bilateral 19    samuel      bilateral    HEMORRHOID SURGERY  1970    KELOID EXCISION  ,     abdominal wall    TOTAL ABDOMINAL HYSTERECTOMY      TRIGGER FINGER RELEASE      left hand       SOCIAL HISTORY:  Social History     Socioeconomic History    Marital status:     Number of children: 2   Occupational History    Occupation:    Tobacco Use    Smoking status: Never    Smokeless tobacco: Never   Substance and Sexual Activity    Alcohol use: No       FAMILY HISTORY:  Family History   Adopted: Yes   Problem Relation Age of Onset    Heart failure Mother     Alzheimer's disease Mother     Other Sister          from too much anesthesia    Arthritis Sister         back problems    Hypertension Sister     Transient ischemic attack Sister     Congenital heart disease Brother     Stroke Brother      Coronary artery disease Brother         s/p stenting    Stroke Brother     Breast cancer Maternal Grandmother     Other Son         had eosinophilic granulomatosis -  shortly after lung transplant    Colon cancer Other     Other Daughter         stiffman syndrome    Diabetes Neg Hx        ALLERGIES AND MEDICATIONS: updated and reviewed.  Review of patient's allergies indicates:   Allergen Reactions    Cholesterol analogues Other (See Comments)     Muscle spasam    Clindamycin Hives    Statins-hmg-coa reductase inhibitors Other (See Comments)    Sulfa (sulfonamide antibiotics)     Tramadol Nausea And Vomiting    Welchol [colesevelam] Other (See Comments)    Codeine Rash    Penicillins Rash     Medication List with Changes/Refills   Current Medications    CALCIUM ORAL    Take 1 tablet by mouth once daily.    CYANOCOBALAMIN (VITAMIN B-12) 1000 MCG TABLET    Take 100 mcg by mouth once daily.    FLUTICASONE (VERAMYST) 27.5 MCG/ACTUATION NASAL SPRAY    2 sprays by Nasal route once daily.    MULTIVITAMIN/IRON/FOLIC ACID (CENTRUM COMPLETE ORAL)    Take 1 tablet by mouth once daily.    OMEGA-3 FATTY ACIDS 1,000 MG CAP    Take by mouth. 1 Capsule Oral Every day    VITAMIN D 185 MG TAB    Take 185 mg by mouth once daily.   Changed and/or Refilled Medications    Modified Medication Previous Medication    COLESTIPOL (COLESTID) 1 GRAM TAB colestipoL (COLESTID) 1 gram Tab       Take 2 tablets (2 g total) by mouth 2 (two) times daily.    Take 2 tablets (2 g total) by mouth 2 (two) times daily.    HYDROCHLOROTHIAZIDE (HYDRODIURIL) 25 MG TABLET hydroCHLOROthiazide (HYDRODIURIL) 25 MG tablet       Take 1 tablet (25 mg total) by mouth once daily.    Take 1 tablet (25 mg total) by mouth once daily.    LISINOPRIL (PRINIVIL,ZESTRIL) 40 MG TABLET lisinopriL (PRINIVIL,ZESTRIL) 40 MG tablet       Take 1 tablet (40 mg total) by mouth once daily.    Take 1 tablet (40 mg total) by mouth once daily.   Discontinued Medications    LIDOCAINE  "HCL 2% 2 % JELP WITH CYCLOPENTOLATE 2 % DROP 5 DROP, TROPICAMIDE 1% 1 % DROP 5 DROP, PHENYLEPHRINE HCL 10% 10 % DROP 5 DROP    AS DIRECTED          CARE TEAM:  Patient Care Team:  Nusrat Cruz MD as PCP - General (Internal Medicine)  Cindy Oscar LPN as Care Coordinator  Shira Barksdale OD (Ophthalmology)            SCREENING HISTORY:  Health Maintenance         Date Due Completion Date    TETANUS VACCINE Never done ---    Shingles Vaccine (1 of 2) Never done ---    DEXA Scan 01/19/2023 1/19/2021    Override on 10/11/2011: Done (osteopenia)    Eye Exam 01/24/2023 1/24/2022    Override on 3/18/2015: Done    Override on 3/4/2014: Done    Override on 1/28/2013: Done    COVID-19 Vaccine (6 - Pfizer series) 05/17/2023 1/17/2023    Mammogram 08/11/2023 8/11/2022    Influenza Vaccine (1) 09/01/2023 2/8/2023    Diabetes Urine Screening 02/01/2024 2/1/2023    Lipid Panel 02/01/2024 2/1/2023    Hemoglobin A1c 02/07/2024 8/7/2023              REVIEW OF SYSTEMS:  The patient reports : good dietary habits.  The patient reports  : that they exercise regularly.  Review of Systems   Constitutional:  Negative for chills and fever.   HENT:  Negative for congestion.    Respiratory:  Negative for cough and shortness of breath.    Cardiovascular:  Negative for chest pain and leg swelling.   Gastrointestinal:  Negative for abdominal pain.   Genitourinary:  Negative for dysuria.   Musculoskeletal:  Negative for arthralgias (mild; chronic; stable) and gait problem.   Psychiatric/Behavioral:  Negative for dysphoric mood.       ROS (Optional)-: no pelvic pain  Breast ROS (Optional)-: negative for breast lumps/discharge        PHYSICAL EXAMINATION/VITALS:  Vitals:    08/09/23 1347   BP: (!) 110/56   Pulse: (!) 54   Temp: 98.2 °F (36.8 °C)   TempSrc: Oral   SpO2: (!) 94%   Weight: 61.6 kg (135 lb 12.9 oz)   Height: 5' 3" (1.6 m)        Body mass index is 24.06 kg/m².      General appearance - alert, well appearing, and in no " distress, normal appearing weight  Psychiatric - alert, oriented to person, place, and time, normal behavior, speech, dress, motor activity and thought process  Eyes - pupils equal and reactive, extraocular eye movements intact, sclera anicteric  Mouth - not examined  Neck - supple, no significant adenopathy, carotids upstroke normal bilaterally, no bruits  Lymphatics - no palpable cervical lymphadenopathy  Chest - clear to auscultation, no wheezes, rales or rhonchi, symmetric air entry  Heart - normal rate and regular rhythm, no gallops noted  Abdomen - soft, nontender, nondistended, no masses or organomegaly  Neurological - alert, normal speech, no focal findings; cranial nerves II through XII intact  Musculoskeletal - no joint tenderness, deformity or swelling, no muscular tenderness noted  Extremities - no pedal edema noted  Skin - normal coloration, no suspicious skin lesions      LABS:  Lab Results   Component Value Date    HGBA1C 5.9 (H) 08/07/2023    HGBA1C 6.1 (H) 02/01/2023    HGBA1C 6.1 (H) 08/03/2022      Lab Results   Component Value Date    CHOL 242 (H) 02/01/2023    CHOL 244 (H) 01/28/2022    CHOL 242 (H) 11/28/2020     Lab Results   Component Value Date    LDLCALC 170.8 (H) 02/01/2023    LDLCALC 170.6 (H) 01/28/2022    LDLCALC 162.6 (H) 11/28/2020         ASSESSMENT AND PLAN:   1. Routine medical exam  Counseled on age appropriate medical preventative services including age appropriate cancer screenings, age appropriate eye and dental exams, over all nutritional health, need for a consistent exercise regimen, and an over all push towards maintaining a vigorous and active lifestyle.  Counseled on age appropriate vaccines and discussed upcoming health care needs based on age/gender. Discussed good sleep hygiene and stress management.  -     hydroCHLOROthiazide (HYDRODIURIL) 25 MG tablet; Take 1 tablet (25 mg total) by mouth once daily.  Dispense: 90 tablet; Refill: 1  -     lisinopriL  (PRINIVIL,ZESTRIL) 40 MG tablet; Take 1 tablet (40 mg total) by mouth once daily.  Dispense: 90 tablet; Refill: 1    2. Controlled type 2 diabetes mellitus without complication, without long-term current use of insulin  Lab Results   Component Value Date    HGBA1C 5.9 (H) 08/07/2023     Diabetes is under good control at this time for age and comorbid conditions.   We discussed diabetic diet and regular exercise.   We discussed home blood sugar monitoring, if appropriate - the patient does not need to test daily but can test only as needed.   We discussed low sugar/low carbohydrate diet and regular exercise to prevent progression. No need for prescription medication at this time.  Recheck A1c in 6 months.  Diabetic complications addressed: Not applicable.  Patient was counseled on the need for yearly eye exam to screen for/monitor diabetic retinopathy and yearly diabetic foot exam.  Overview:  - diet controlled.      3. Benign hypertension with chronic kidney disease, stage IV  BP Readings from Last 1 Encounters:   08/09/23 (!) 110/56      Discussed sodium restriction, maintaining ideal body weight and regular exercise program as physiologic means to achieve blood pressure control. The patient will strive towards this.   The current medical regimen is effective;  continue present plan and medications. Recommended patient to check home readings to monitor and see me for followup as scheduled or sooner as needed.   Patient was educated that both decongestant and anti-inflammatory medication may raise blood pressure.  Stable decreased kidney function. Observe. Patient counseled to avoid/minimize the use of anti-inflammatory  Medication. Discussed to stay well hydrated. Also discussed with patient that good control of blood pressure and/or diabetes, if present, will help to prevent progression.  The patient is not active on the digital hypertension program.    4. Hyperlipidemia LDL goal <100/5. Statin myopathy  Lab  Results   Component Value Date    CHOL 242 (H) 02/01/2023     Lab Results   Component Value Date    HDL 45 02/01/2023     Lab Results   Component Value Date    LDLCALC 170.8 (H) 02/01/2023     Lab Results   Component Value Date    TRIG 131 02/01/2023     Lab Results   Component Value Date    LDLCALC 170.8 (H) 02/01/2023     We discussed low fat diet and regular exercise. Patient has statin myopathy.  Overview:  unable to tolerate statins or Welchol    Orders:  -     colestipoL (COLESTID) 1 gram Tab; Take 2 tablets (2 g total) by mouth 2 (two) times daily.  Dispense: 360 tablet; Refill: 1      6. Atherosclerosis of abdominal aorta  Patient with Atherosclerosis of the Aorta.  Stable/asymptomatic. Currently stable on lipid and blood pressure monitoring. Statin intolerant.  Overview:  noted on CT scan 4/6/2016      7. Alpha thalassemia trait  Stable. Asymptomatic. Observe.    8. Secondary hyperparathyroidism of renal origin  Stable. Asymptomatic. Observe.    9. Osteopenia after menopause  We discussed adequate calcium intake (preferably from diet) and vitamin D supplementation. We discussed fall precautions. She is planning to call back to schedule her BMD. No need for prescription medication at this time. Further treatment plan will be based on results of bone density testing.  Overview:  no need for medication at this time - last BMD March 2016  BMD 1/2021 - No need for Rx tx at this time.       10. Need for Tdap vaccination  Patient was advised to get immunization at the pharmacy.    11. Need for shingles vaccine  Patient was advised to get immunization at the pharmacy.    12. Encounter for screening mammogram for breast cancer    -     Mammo Digital Screening Bilat w/ Coleman; Future; Expected date: 08/11/2023             Orders Placed This Encounter   Procedures    Mammo Digital Screening Bilat w/ [facility]      FOLLOW UP: Follow up in about 6 months (around 2/9/2024) for follow up chronic medical conditions.. or sooner as  needed.

## 2023-08-09 NOTE — PROGRESS NOTES
Patient had health maintenance eye exam  done outside [facility] . Message sent to cc coordinator for results to update chart.   Patient states she will get vaccines at pharmacy. Patient declines DEXA scan.

## 2023-08-11 ENCOUNTER — PATIENT OUTREACH (OUTPATIENT)
Dept: ADMINISTRATIVE | Facility: HOSPITAL | Age: 83
End: 2023-08-11
Payer: MEDICARE

## 2023-08-14 ENCOUNTER — HOSPITAL ENCOUNTER (OUTPATIENT)
Dept: RADIOLOGY | Facility: HOSPITAL | Age: 83
Discharge: HOME | End: 2023-08-14
Attending: INTERNAL MEDICINE
Payer: MEDICARE

## 2023-08-14 DIAGNOSIS — Z12.31 ENCOUNTER FOR SCREENING MAMMOGRAM FOR BREAST CANCER: ICD-10-CM

## 2023-08-14 PROCEDURE — 77067 SCR MAMMO BI INCL CAD: CPT | Mod: TC,PO

## 2023-08-14 PROCEDURE — 77063 BREAST TOMOSYNTHESIS BI: CPT | Mod: 26,,, | Performed by: RADIOLOGY

## 2023-08-14 PROCEDURE — 77067 MAMMO DIGITAL SCREENING BILAT WITH TOMO: ICD-10-PCS | Mod: 26,,, | Performed by: RADIOLOGY

## 2023-08-14 PROCEDURE — 77067 SCR MAMMO BI INCL CAD: CPT | Mod: 26,,, | Performed by: RADIOLOGY

## 2023-08-14 PROCEDURE — 77063 MAMMO DIGITAL SCREENING BILAT WITH TOMO: ICD-10-PCS | Mod: 26,,, | Performed by: RADIOLOGY

## 2023-08-21 ENCOUNTER — OFFICE VISIT (OUTPATIENT)
Dept: URGENT CARE | Facility: CLINIC | Age: 83
End: 2023-08-21
Payer: MEDICARE

## 2023-08-21 VITALS
OXYGEN SATURATION: 98 % | TEMPERATURE: 98 F | DIASTOLIC BLOOD PRESSURE: 76 MMHG | HEART RATE: 54 BPM | WEIGHT: 135 LBS | BODY MASS INDEX: 23.92 KG/M2 | HEIGHT: 63 IN | RESPIRATION RATE: 19 BRPM | SYSTOLIC BLOOD PRESSURE: 151 MMHG

## 2023-08-21 DIAGNOSIS — M10.9 ACUTE GOUT INVOLVING TOE OF RIGHT FOOT, UNSPECIFIED CAUSE: Primary | ICD-10-CM

## 2023-08-21 PROCEDURE — 96372 THER/PROPH/DIAG INJ SC/IM: CPT | Mod: S$GLB,,, | Performed by: FAMILY MEDICINE

## 2023-08-21 PROCEDURE — 99203 PR OFFICE/OUTPT VISIT, NEW, LEVL III, 30-44 MIN: ICD-10-PCS | Mod: 25,S$GLB,, | Performed by: FAMILY MEDICINE

## 2023-08-21 PROCEDURE — 99203 OFFICE O/P NEW LOW 30 MIN: CPT | Mod: 25,S$GLB,, | Performed by: FAMILY MEDICINE

## 2023-08-21 PROCEDURE — 96372 PR INJECTION,THERAP/PROPH/DIAG2ST, IM OR SUBCUT: ICD-10-PCS | Mod: S$GLB,,, | Performed by: FAMILY MEDICINE

## 2023-08-21 RX ORDER — DEXAMETHASONE SODIUM PHOSPHATE 100 MG/10ML
4 INJECTION INTRAMUSCULAR; INTRAVENOUS
Status: COMPLETED | OUTPATIENT
Start: 2023-08-21 | End: 2023-08-21

## 2023-08-21 RX ORDER — ACETAMINOPHEN 500 MG
1000 TABLET ORAL
Status: COMPLETED | OUTPATIENT
Start: 2023-08-21 | End: 2023-08-21

## 2023-08-21 RX ADMIN — DEXAMETHASONE SODIUM PHOSPHATE 4 MG: 100 INJECTION INTRAMUSCULAR; INTRAVENOUS at 09:08

## 2023-08-21 RX ADMIN — Medication 1000 MG: at 09:08

## 2023-08-21 NOTE — PATIENT INSTRUCTIONS
General Discharge Instructions   PLEASE READ YOUR DISCHARGE INSTRUCTIONS ENTIRELY AS IT CONTAINS IMPORTANT INFORMATION.  If you were prescribed a narcotic or controlled medication, do not drive or operate heavy equipment or machinery while taking these medications.  If you were prescribed antibiotics, please take them to completion.  You must understand that you've received an Urgent Care treatment only and that you may be released before all your medical problems are known or treated. You, the patient, will arrange for follow up care as instructed.    OVER THE COUNTER RECOMMENDATIONS/SUGGESTIONS.    Make sure to stay well hydrated.    Use Nasal Saline to mechanically move any post nasal drip from your eustachian tube or from the back of your throat.    Use warm salt water gargles to ease your throat pain. Warm salt water gargles as needed for sore throat- 1/2 tsp salt to 1 cup warm water, gargle as desired.    Use an antihistamine such as Claritin, Zyrtec or Allegra to dry you out.    Use pseudoephedrine (behind the counter) to decongest. Pseudoephedrine 30 mg up to 240 mg /day. It can raise your blood pressure and give you palpitations.    Use mucinex (guaifenesin) to break up mucous up to 2400mg/day to loosen any mucous.    The mucinex DM pill has a cough suppressant that can be sedating. It can be used at night to stop the tickle at the back of your throat.    You can use Mucinex D (it has guaifenesin and a high dose of pseudoephedrine) in the mornings to help decongest.    Use Afrin in each nare for no longer than 3 days, as it is addictive. It can also dry out your mucous membranes and cause elevated blood pressure. This is especially useful if you are flying.    Use Flonase 1-2 sprays/nostril per day. It is a local acting steroid nasal spray, if you develop a bloody nose, stop using the medication immediately.    Sometimes Nyquil at night is beneficial to help you get some rest, however it is sedating and it  does have an antihistamine, and tylenol.    Honey is a natural cough suppressant that can be used.    Tylenol up to 4,000 mg a day is safe for short periods and can be used for body aches, pain, and fever. However in high doses and prolonged use it can cause liver irritation.    Ibuprofen is a non-steroidal anti-inflammatory that can be used for body aches, pain, and fever.However it can also cause stomach irritation if over used.     Follow up with your PCP or specialty clinic as instructed in the next 2-3 days if not improved or as needed. You can call [phone] to schedule an appointment with appropriate provider.      If you condition worsens, we recommend that you receive another evaluation at the emergency room immediately or contact your primary medical clinic's after hours call service to discuss your concerns.      Please return here or go to the Emergency Department for any concerns or worsening condition.   You can also call [phone] to schedule an appointment with the appropriate provider.    Please return here or go to the Emergency Department for any concerns or worsening of condition.    Thank you for choosing [facility]!    Our goal in the Urgent Care is to always provide outstanding medical care. You may receive a survey by mail or e-mail in the next week regarding your experience today. We would greatly appreciate you completing and returning the survey. Your feedback provides us with a way to recognize our staff who provide very good care, and it helps us learn how to improve when your experience was below our aspiration of excellence.      We appreciate you trusting us with your medical care. We hope you feel better soon. We will be happy to take care of you for all of your future medical needs.    Sincerely,    EBER Zhou  Steroid Injection  You received a steroid shot today - As discussed, this can elevate your blood pressure, elevate your blood sugar, water weight  gain, nervous energy, redness to the face and dimpling of the skin where the shot goes in.   PLEASE READ YOUR DISCHARGE INSTRUCTIONS ENTIRELY AS IT CONTAINS IMPORTANT INFORMATION.     Take two colchicine when you get the prescription then one pill an hour later.      Indomethacin for pain. You should take an over the counter antacid (zantac, Nexium, Prilosec) to protect your stomach. Eat with this medication.      You received a steroid shot today - this can elevate your blood pressure, elevate your blood sugar, water weight gain, nervous energy, redness to the face and dimpling of the skin where the shot goes in.   Do not use steroids more than 3 times per year.   If you have diabetes, please check you blood sugar frequently.  If you have high blood pressure, please check your blood pressure frequently.      Please follow up with your primary care doctor for further treatment if your symptoms do not improve.      If your symptoms worsen in the mean time (numbness to the area, inability to use your extremity, severely worsening pain or swelling, fever, blood in your stool or dark brown stool) please go to the emergency room for further treatment.    Please arrange follow up with your primary medical clinic as soon as possible. You must understand that you've received an Urgent Care treatment only and that you may be released before all of your medical problems are known or treated. You, the patient, will arrange for follow up as instructed. If your symptoms worsen or fail to improve you should go to the Emergency Room.   WE CANNOT RULE OUT ALL POSSIBLE CAUSES OF YOUR SYMPTOMS IN THE URGENT CARE SETTING PLEASE GO TO THE ER IF YOU FEELS YOUR CONDITION IS WORSENING OR YOU WOULD LIKE EMERGENT EVALUATION.

## 2023-08-21 NOTE — PROGRESS NOTES
"Subjective:      Patient ID: Lizbeth Hutchison is a 83 y.o. female.    Vitals:  height is 5' 3" (1.6 m) and weight is 61.2 kg (135 lb). Her oral temperature is 98 °F (36.7 °C). Her blood pressure is 151/76 (abnormal) and her pulse is 54 (abnormal). Her respiration is 19 and oxygen saturation is 98%.     Chief Complaint: Toe Pain    Pt states that she is having right big toe pain that started 8/20. Pain level is 5 and pt thinks its gout .   Provider note begins below:  Past Medical History:  No date: Alpha thalassemia trait  No date: Anxiety  No date: Atherosclerosis of abdominal aorta      Comment:  noted on CT scan 4/6/2016  No date: DDD (degenerative disc disease), lumbar      Comment:  chronic low back pain  No date: Diverticulosis  No date: History of colonic polyps      Comment:  last colonoscopy 2011 - normal  No date: Hyperlipidemia LDL goal <100      Comment:  unable to tolerate statins or Welchol  No date: Hypertension  No date: Osteopenia      Comment:  no need for medication at this time - last BMD October 2010  No date: Overweight  No date: Type II or unspecified type diabetes mellitus without   mention of complication, not stated as uncontrolled  No date: Vitamin D deficiency disease      Comment:  resolved with supplementation   Patient with above past medical history presents with complaints of acute right great toe pain.  She says she thought it started to feel swollen yesterday, woke up at 3 this morning with pain.  She reports this is similar to her gout flare in 2017.  She was treated at that time with 4 mg of dexamethasone and tramadol.  She would like steroid shot again.  She has not tried anything yet for pain.  Denies any fever, chills.  She still drives independently, and leads line dancing classes.    Toe Pain   The incident occurred 3 to 6 hours ago. There was no injury mechanism. The pain is present in the right toes. The quality of the pain is described as aching. The " pain is at a severity of 5/10. The pain is mild. The pain has been Constant since onset. Pertinent negatives include no inability to bear weight, loss of motion, loss of sensation, muscle weakness, numbness or tingling. She reports no foreign bodies present. The symptoms are aggravated by movement. She has tried nothing for the symptoms.       Constitution: Negative for activity change, appetite change, chills, sweating, fatigue, fever, unexpected weight change and generalized weakness.   Musculoskeletal:  Positive for gout. Negative for pain, trauma, joint pain, joint swelling, abnormal ROM of joint, arthritis, back pain, muscle cramps and muscle ache.   Skin:  Negative for erythema, bruising and abscess.   Neurological:  Negative for numbness.      Objective:     Physical Exam   Constitutional: She is oriented to person, place, and time. She appears well-developed.  Non-toxic appearance. She does not appear ill. No distress.   HENT:   Head: Normocephalic and atraumatic.   Ears:   Right Ear: External ear normal.   Left Ear: External ear normal.   Nose: Nose normal.   Mouth/Throat: Oropharynx is clear and moist.   Eyes: Conjunctivae, EOM and lids are normal. Pupils are equal, round, and reactive to light.   Neck: Trachea normal and phonation normal. Neck supple.   Cardiovascular:   Pulses:       Dorsalis pedis pulses are 2+ on the right side and 2+ on the left side.   Musculoskeletal: Normal range of motion.         General: Normal range of motion.      Right foot: Right great toe: Exhibits tenderness and swelling (pain with rom).   Neurological: She is alert and oriented to person, place, and time.   Skin: Skin is warm, dry, intact and not diaphoretic. No erythema   Psychiatric: Her speech is normal and behavior is normal. Judgment and thought content normal.   Nursing note and vitals reviewed.      Assessment:     1. Acute gout involving toe of right foot, unspecified cause        Plan:     24 mL/min  Creatinine  clearance, original Cockcroft-Gault  I have discussed in detail with pt the side effects of steroids including mental changes, sleep disturbance, skin changes at the injection site, suicidal ideations, increased blood pressure, increased glucose, and DVT and PE.  she agrees to proceed    Discussed results/diagnosis/plan with patient in clinic. Strict precautions given to patient to monitor for worsening signs and symptoms. Advised to follow up with PCP or specialist.    Explained side effects of medications prescribed with patient and informed him/her to discontinue use if he/she has any side effects and to inform UC or PCP if this occurs. All questions answered. Strict ED verses clinic return precautions stressed and given in depth. Advised if symptoms worsens of fail to improve he/she should go to the Emergency Room. Discharge and follow-up instructions given verbally/printed with the patient who expressed understanding and willingness to comply with my recommendations. Patient voiced understanding and in agreement with current treatment plan. Patient exits the exam room in no acute distress. Conversant and engaged during discharge discussion, verbalized understanding.      Acute gout involving toe of right foot, unspecified cause  -     dexAMETHasone injection 4 mg  -     acetaminophen tablet 1,000 mg          Medical Decision Making:   History:   Old Medical Records: I decided to obtain old medical records.  Old Records Summarized: records from clinic visits.       Patient Instructions   General Discharge Instructions   PLEASE READ YOUR DISCHARGE INSTRUCTIONS ENTIRELY AS IT CONTAINS IMPORTANT INFORMATION.  If you were prescribed a narcotic or controlled medication, do not drive or operate heavy equipment or machinery while taking these medications.  If you were prescribed antibiotics, please take them to completion.  You must understand that you've received an Urgent Care treatment only and that you may be  released before all your medical problems are known or treated. You, the patient, will arrange for follow up care as instructed.    OVER THE COUNTER RECOMMENDATIONS/SUGGESTIONS.    Make sure to stay well hydrated.    Use Nasal Saline to mechanically move any post nasal drip from your eustachian tube or from the back of your throat.    Use warm salt water gargles to ease your throat pain. Warm salt water gargles as needed for sore throat- 1/2 tsp salt to 1 cup warm water, gargle as desired.    Use an antihistamine such as Claritin, Zyrtec or Allegra to dry you out.    Use pseudoephedrine (behind the counter) to decongest. Pseudoephedrine 30 mg up to 240 mg /day. It can raise your blood pressure and give you palpitations.    Use mucinex (guaifenesin) to break up mucous up to 2400mg/day to loosen any mucous.    The mucinex DM pill has a cough suppressant that can be sedating. It can be used at night to stop the tickle at the back of your throat.    You can use Mucinex D (it has guaifenesin and a high dose of pseudoephedrine) in the mornings to help decongest.    Use Afrin in each nare for no longer than 3 days, as it is addictive. It can also dry out your mucous membranes and cause elevated blood pressure. This is especially useful if you are flying.    Use Flonase 1-2 sprays/nostril per day. It is a local acting steroid nasal spray, if you develop a bloody nose, stop using the medication immediately.    Sometimes Nyquil at night is beneficial to help you get some rest, however it is sedating and it does have an antihistamine, and tylenol.    Honey is a natural cough suppressant that can be used.    Tylenol up to 4,000 mg a day is safe for short periods and can be used for body aches, pain, and fever. However in high doses and prolonged use it can cause liver irritation.    Ibuprofen is a non-steroidal anti-inflammatory that can be used for body aches, pain, and fever.However it can also cause stomach irritation if  over used.     Follow up with your PCP or specialty clinic as instructed in the next 2-3 days if not improved or as needed. You can call [phone] to schedule an appointment with appropriate provider.      If you condition worsens, we recommend that you receive another evaluation at the emergency room immediately or contact your primary medical clinic's after hours call service to discuss your concerns.      Please return here or go to the Emergency Department for any concerns or worsening condition.   You can also call [phone] to schedule an appointment with the appropriate provider.    Please return here or go to the Emergency Department for any concerns or worsening of condition.    Thank you for choosing [facility]!    Our goal in the Urgent Care is to always provide outstanding medical care. You may receive a survey by mail or e-mail in the next week regarding your experience today. We would greatly appreciate you completing and returning the survey. Your feedback provides us with a way to recognize our staff who provide very good care, and it helps us learn how to improve when your experience was below our aspiration of excellence.      We appreciate you trusting us with your medical care. We hope you feel better soon. We will be happy to take care of you for all of your future medical needs.    Sincerely,    EBER Zhou  Steroid Injection  You received a steroid shot today - As discussed, this can elevate your blood pressure, elevate your blood sugar, water weight gain, nervous energy, redness to the face and dimpling of the skin where the shot goes in.   PLEASE READ YOUR DISCHARGE INSTRUCTIONS ENTIRELY AS IT CONTAINS IMPORTANT INFORMATION.     Take two colchicine when you get the prescription then one pill an hour later.      Indomethacin for pain. You should take an over the counter antacid (zantac, Nexium, Prilosec) to protect your stomach. Eat with this medication.      You  received a steroid shot today - this can elevate your blood pressure, elevate your blood sugar, water weight gain, nervous energy, redness to the face and dimpling of the skin where the shot goes in.   Do not use steroids more than 3 times per year.   If you have diabetes, please check you blood sugar frequently.  If you have high blood pressure, please check your blood pressure frequently.      Please follow up with your primary care doctor for further treatment if your symptoms do not improve.      If your symptoms worsen in the mean time (numbness to the area, inability to use your extremity, severely worsening pain or swelling, fever, blood in your stool or dark brown stool) please go to the emergency room for further treatment.    Please arrange follow up with your primary medical clinic as soon as possible. You must understand that you've received an Urgent Care treatment only and that you may be released before all of your medical problems are known or treated. You, the patient, will arrange for follow up as instructed. If your symptoms worsen or fail to improve you should go to the Emergency Room.   WE CANNOT RULE OUT ALL POSSIBLE CAUSES OF YOUR SYMPTOMS IN THE URGENT CARE SETTING PLEASE GO TO THE ER IF YOU FEELS YOUR CONDITION IS WORSENING OR YOU WOULD LIKE EMERGENT EVALUATION.

## 2023-10-03 ENCOUNTER — PATIENT MESSAGE (OUTPATIENT)
Dept: FAMILY MEDICINE | Facility: CLINIC | Age: 83
End: 2023-10-03
Payer: MEDICARE

## 2024-02-06 ENCOUNTER — PATIENT OUTREACH (OUTPATIENT)
Dept: ADMINISTRATIVE | Facility: HOSPITAL | Age: 84
End: 2024-02-06
Payer: MEDICARE

## 2024-02-06 ENCOUNTER — TELEPHONE (OUTPATIENT)
Dept: FAMILY MEDICINE | Facility: CLINIC | Age: 84
End: 2024-02-06
Payer: MEDICARE

## 2024-02-06 DIAGNOSIS — I12.9 BENIGN HYPERTENSION WITH CHRONIC KIDNEY DISEASE, STAGE IV: ICD-10-CM

## 2024-02-06 DIAGNOSIS — N18.4 BENIGN HYPERTENSION WITH CHRONIC KIDNEY DISEASE, STAGE IV: ICD-10-CM

## 2024-02-06 DIAGNOSIS — E11.9 CONTROLLED TYPE 2 DIABETES MELLITUS WITHOUT COMPLICATION, WITHOUT LONG-TERM CURRENT USE OF INSULIN: Primary | ICD-10-CM

## 2024-02-06 DIAGNOSIS — E78.5 HYPERLIPIDEMIA LDL GOAL <100: Primary | ICD-10-CM

## 2024-02-06 DIAGNOSIS — E11.9 CONTROLLED TYPE 2 DIABETES MELLITUS WITHOUT COMPLICATION, WITHOUT LONG-TERM CURRENT USE OF INSULIN: ICD-10-CM

## 2024-02-06 RX ORDER — FLUTICASONE PROPIONATE 50 MCG
SPRAY, SUSPENSION (ML) NASAL
COMMUNITY
Start: 2023-12-03

## 2024-02-06 RX ORDER — OSELTAMIVIR PHOSPHATE 75 MG/1
CAPSULE ORAL
COMMUNITY
Start: 2023-12-03 | End: 2024-02-12 | Stop reason: ALTCHOICE

## 2024-02-06 NOTE — PROGRESS NOTES
[facility] 1 CKD Report 02.02.24 - no Nephrologist listed / note added for referral to the patient's upcoming appointment.    Non-Compliant Blood Pressure Reading - the patient has an upcoming appointment w/ primary care on 02/12/2024.

## 2024-02-12 ENCOUNTER — OFFICE VISIT (OUTPATIENT)
Dept: FAMILY MEDICINE | Facility: CLINIC | Age: 84
End: 2024-02-12
Payer: MEDICARE

## 2024-02-12 VITALS
BODY MASS INDEX: 25.09 KG/M2 | WEIGHT: 141.63 LBS | HEART RATE: 52 BPM | DIASTOLIC BLOOD PRESSURE: 64 MMHG | SYSTOLIC BLOOD PRESSURE: 126 MMHG | TEMPERATURE: 98 F | OXYGEN SATURATION: 96 % | HEIGHT: 63 IN

## 2024-02-12 DIAGNOSIS — T46.6X5A STATIN MYOPATHY: ICD-10-CM

## 2024-02-12 DIAGNOSIS — I70.0 ATHEROSCLEROSIS OF ABDOMINAL AORTA: ICD-10-CM

## 2024-02-12 DIAGNOSIS — Z23 NEED FOR TDAP VACCINATION: ICD-10-CM

## 2024-02-12 DIAGNOSIS — G72.0 STATIN MYOPATHY: ICD-10-CM

## 2024-02-12 DIAGNOSIS — M85.80 OSTEOPENIA AFTER MENOPAUSE: ICD-10-CM

## 2024-02-12 DIAGNOSIS — Z23 NEED FOR SHINGLES VACCINE: ICD-10-CM

## 2024-02-12 DIAGNOSIS — Z78.0 OSTEOPENIA AFTER MENOPAUSE: ICD-10-CM

## 2024-02-12 DIAGNOSIS — H61.23 BILATERAL IMPACTED CERUMEN: ICD-10-CM

## 2024-02-12 DIAGNOSIS — N18.4 BENIGN HYPERTENSION WITH CHRONIC KIDNEY DISEASE, STAGE IV: ICD-10-CM

## 2024-02-12 DIAGNOSIS — I12.9 BENIGN HYPERTENSION WITH CHRONIC KIDNEY DISEASE, STAGE IV: ICD-10-CM

## 2024-02-12 DIAGNOSIS — D56.3 ALPHA THALASSEMIA TRAIT: ICD-10-CM

## 2024-02-12 DIAGNOSIS — E78.5 HYPERLIPIDEMIA LDL GOAL <100: ICD-10-CM

## 2024-02-12 DIAGNOSIS — E11.9 CONTROLLED TYPE 2 DIABETES MELLITUS WITHOUT COMPLICATION, WITHOUT LONG-TERM CURRENT USE OF INSULIN: Primary | ICD-10-CM

## 2024-02-12 DIAGNOSIS — N25.81 SECONDARY HYPERPARATHYROIDISM OF RENAL ORIGIN: ICD-10-CM

## 2024-02-12 DIAGNOSIS — Z00.00 ROUTINE MEDICAL EXAM: ICD-10-CM

## 2024-02-12 DIAGNOSIS — Z23 FLU VACCINE NEED: ICD-10-CM

## 2024-02-12 PROCEDURE — 99214 OFFICE O/P EST MOD 30 MIN: CPT | Mod: S$PBB,,, | Performed by: INTERNAL MEDICINE

## 2024-02-12 PROCEDURE — G0008 ADMIN INFLUENZA VIRUS VAC: HCPCS | Mod: PBBFAC,PO

## 2024-02-12 PROCEDURE — 90694 VACC AIIV4 NO PRSRV 0.5ML IM: CPT | Mod: PBBFAC,PO

## 2024-02-12 PROCEDURE — 99999PBSHW FLU VACCINE - QUADRIVALENT - ADJUVANTED: Mod: PBBFAC,,,

## 2024-02-12 PROCEDURE — 99215 OFFICE O/P EST HI 40 MIN: CPT | Mod: PBBFAC,PO,25 | Performed by: INTERNAL MEDICINE

## 2024-02-12 PROCEDURE — 99999 PR PBB SHADOW E&M-EST. PATIENT-LVL V: CPT | Mod: PBBFAC,,, | Performed by: INTERNAL MEDICINE

## 2024-02-12 RX ORDER — LISINOPRIL 40 MG/1
40 TABLET ORAL DAILY
Qty: 90 TABLET | Refills: 1 | Status: SHIPPED | OUTPATIENT
Start: 2024-02-12

## 2024-02-12 RX ORDER — HYDROCHLOROTHIAZIDE 25 MG/1
25 TABLET ORAL DAILY
Qty: 90 TABLET | Refills: 1 | Status: SHIPPED | OUTPATIENT
Start: 2024-02-12

## 2024-02-12 NOTE — PROGRESS NOTES
CHIEF COMPLAINT:   Chief Complaint   Patient presents with    Diabetes     6 month           HISTORY OF PRESENT ILLNESS:  Lizbeth Hutchison is a 83 y.o. female who presents to the clinic today for Diabetes (6 month )  .     The patient presents to clinic today for follow-up of her type 2 diabetes mellitus, hypertension complicated by chronic kidney disease stage 4, and hyperlipidemia.  She states she is overall doing well.  She continues to travel with a senior group.  Her primary complaint today was recent severe Charley horse in her left leg when she had to walk long distances.  She has occasional insomnia.  Somebody advised her to take melatonin, but she wanted to discuss with me 1st if this was okay to take. The patient denies any problems with cardiac chest pain, dizziness, palpitations, orthopnea, or lower extremity edema. The patient reports compliance with current medication- patient denies missing any  medication doses.    She also reports that she has some decreased hearing in her right ear.  She thinks she may have cerumen impaction.    Subjective    PAST MEDICAL HISTORY:  Past Medical History:   Diagnosis Date    Alpha thalassemia trait     Anxiety     Atherosclerosis of abdominal aorta     noted on CT scan 4/6/2016    DDD (degenerative disc disease), lumbar     chronic low back pain    Diverticulosis     History of colonic polyps     last colonoscopy 2011 - normal    Hyperlipidemia LDL goal <100     unable to tolerate statins or Welchol    Hypertension     Osteopenia     no need for medication at this time - last BMD October 2010    Overweight     Type II or unspecified type diabetes mellitus without mention of complication, not stated as uncontrolled     Vitamin D deficiency disease     resolved with supplementation       PAST SURGICAL HISTORY:  Past Surgical History:   Procedure Laterality Date    CATARACT EXTRACTION Bilateral 6/25/19 8/23/19    samuel  1994    bilateral    HEMORRHOID SURGERY   1970    KELOID EXCISION  ,     abdominal wall    TOTAL ABDOMINAL HYSTERECTOMY      TRIGGER FINGER RELEASE      left hand       SOCIAL HISTORY:  Social History     Socioeconomic History    Marital status:     Number of children: 2   Occupational History    Occupation:    Tobacco Use    Smoking status: Never    Smokeless tobacco: Never   Substance and Sexual Activity    Alcohol use: No    Drug use: Never       FAMILY HISTORY:  Family History   Adopted: Yes   Problem Relation Age of Onset    Heart failure Mother     Alzheimer's disease Mother     Other Sister          from too much anesthesia    Arthritis Sister         back problems    Hypertension Sister     Transient ischemic attack Sister     Congenital heart disease Brother     Stroke Brother     Coronary artery disease Brother         s/p stenting    Stroke Brother     Breast cancer Maternal Grandmother     Other Son         had eosinophilic granulomatosis -  shortly after lung transplant    Colon cancer Other     Other Daughter         stiffman syndrome    Diabetes Neg Hx        ALLERGIES AND MEDICATIONS: updated and reviewed.  Review of patient's allergies indicates:   Allergen Reactions    Cholesterol analogues Other (See Comments)     Muscle spasam    Clindamycin Hives    Statins-hmg-coa reductase inhibitors Other (See Comments)    Sulfa (sulfonamide antibiotics)     Tramadol Nausea And Vomiting    Welchol [colesevelam] Other (See Comments)    Codeine Rash    Penicillins Rash     Medication List with Changes/Refills   Current Medications    CALCIUM ORAL    Take 1 tablet by mouth once daily.    COLESTIPOL (COLESTID) 1 GRAM TAB    Take 2 tablets (2 g total) by mouth 2 (two) times daily.    CYANOCOBALAMIN (VITAMIN B-12) 1000 MCG TABLET    Take 100 mcg by mouth once daily.    FLUTICASONE (VERAMYST) 27.5 MCG/ACTUATION NASAL SPRAY    2 sprays by Nasal route once daily.    FLUTICASONE PROPIONATE (FLONASE) 50  "MCG/ACTUATION NASAL SPRAY    SPRAY 1 SPRAY INTO EACH NOSTRIL ONCE DAILY    HYDROCHLOROTHIAZIDE (HYDRODIURIL) 25 MG TABLET    Take 1 tablet (25 mg total) by mouth once daily.    LISINOPRIL (PRINIVIL,ZESTRIL) 40 MG TABLET    Take 1 tablet (40 mg total) by mouth once daily.    MULTIVITAMIN/IRON/FOLIC ACID (CENTRUM COMPLETE ORAL)    Take 1 tablet by mouth once daily.    OMEGA-3 FATTY ACIDS 1,000 MG CAP    Take by mouth. 1 Capsule Oral Every day    OSELTAMIVIR (TAMIFLU) 75 MG CAPSULE    TAKE 1 CAPSULE BY MOUTH EVERY 12 HOURS FOR 5 DAYS    VITAMIN D 185 MG TAB    Take 185 mg by mouth once daily.       CARE TEAM:  Patient Care Team:  Nusrat Cruz MD as PCP - General (Internal Medicine)  Cindy Oscar LPN as Care Coordinator  Shira Barksdale OD (Ophthalmology)       REVIEW OF SYSTEMS:  Review of Systems   Constitutional:  Negative for chills and fever.   HENT:  Negative for congestion.    Respiratory:  Negative for shortness of breath.    Cardiovascular:  Negative for chest pain and leg swelling.   Gastrointestinal:  Negative for abdominal pain.   Genitourinary:  Negative for dysuria.   Musculoskeletal:  Positive for arthralgias and back pain.   Skin:  Negative for rash.             Objective    PHYSICAL EXAMINATION/VITALS:  Vitals:    02/12/24 1550   BP: 126/64   Pulse: (!) 52   Temp: 97.7 °F (36.5 °C)   TempSrc: Oral   SpO2: 96%   Weight: 64.3 kg (141 lb 10.3 oz)   Height: 5' 3" (1.6 m)       Body mass index is 25.09 kg/m².      General appearance - alert, well appearing, and in no distress, overweight  Psychiatric - alert, oriented to person, place, and time, normal behavior, speech, dress, motor activity and thought process  Ears - bilateral cerumen impaction noted; cerumen appeared hard and deep in the ear canal  Eyes - pupils equal and reactive, extraocular eye movements intact, sclera anicteric  Neck - supple, no significant adenopathy, carotids upstroke normal bilaterally, no bruits  Lymphatics - no " palpable cervical lymphadenopathy  Chest - clear to auscultation, no wheezes, rales or rhonchi, symmetric air entry  Heart - normal rate and regular rhythm  Neurological - alert, normal speech, no focal findings; cranial nerves II through XII intact  Musculoskeletal - patient noted to have Moderate osteoarthritic changes to both knee joints. No joint effusions noted.  Extremities - no pedal edema noted  Skin - normal coloration, no suspicious skin lesions      LABS:  Ordered/Scheduled            ASSESSMENT AND PLAN:  1. Controlled type 2 diabetes mellitus without complication, without long-term current use of insulin  Lab Results   Component Value Date    HGBA1C 5.9 (H) 08/07/2023     Diabetes is under good control at this time for age and comorbid conditions.   We discussed diabetic diet and regular exercise.   We discussed home blood sugar monitoring, if appropriate - the patient does not need to test daily but can test only as needed.   We discussed low sugar/low carbohydrate diet and regular exercise to prevent progression. No need for prescription medication at this time.  Diabetic complications addressed: None addressed today.  Patient was counseled on the need for yearly eye exam to screen for/monitor diabetic retinopathy and yearly diabetic foot exam.  Overview:  - diet controlled.      2. Benign hypertension with chronic kidney disease, stage IV  BP Readings from Last 1 Encounters:   02/12/24 126/64      Discussed sodium restriction, maintaining ideal body weight and regular exercise program as physiologic means to achieve blood pressure control. The patient will strive towards this.   The current medical regimen is effective;  continue present plan and medications. Recommended patient to check home readings to monitor and see me for followup as scheduled or sooner as needed.   Patient was educated that both decongestant and anti-inflammatory medication may raise blood pressure.  Stable decreased kidney  function. Observe. Patient counseled to avoid/minimize the use of anti-inflammatory  Medication. Discussed to stay well hydrated. Also discussed with patient that good control of blood pressure and/or diabetes, if present, will help to prevent progression.  The patient is not active on the digital hypertension program.    3. Hyperlipidemia LDL goal <100/4. Statin myopathy  Lab Results   Component Value Date    CHOL 242 (H) 02/01/2023     Lab Results   Component Value Date    HDL 45 02/01/2023     Lab Results   Component Value Date    LDLCALC 170.8 (H) 02/01/2023     Lab Results   Component Value Date    TRIG 131 02/01/2023     Lab Results   Component Value Date    LDLCALC 170.8 (H) 02/01/2023     We discussed low fat diet and regular exercise. Patient has statin myopathy.  Overview:  unable to tolerate statins or Welchol        5. Atherosclerosis of abdominal aorta  Patient with Atherosclerosis of the Aorta.  Stable/asymptomatic. Currently stable on lipid and blood pressure monitoring. Statin intolerant.  Overview:  noted on CT scan 4/6/2016      6. Alpha thalassemia trait  Stable. Asymptomatic. Observe.    7. Secondary hyperparathyroidism of renal origin  Stable. Asymptomatic. Observe.    8. Osteopenia after menopause  We discussed adequate calcium intake (preferably from diet) and vitamin D supplementation. We discussed fall precautions. She is due for her BMD. No need for prescription medication at this time. Further treatment plan will be based on results of bone density testing.  Overview:  no need for medication at this time - last BMD March 2016  BMD 1/2021 - No need for Rx tx at this time.       9. Need for Tdap vaccination/10. Need for shingles vaccine  Patient was advised to get immunization at the pharmacy.    11. Flu vaccine need    -     Influenza (FLUAD) - Quadrivalent (Adjuvanted) *Preferred* (65+) (PF)    12. Bilateral impacted cerumen  I will refer her to ENT for removal.  -     Ambulatory  referral/consult to ENT; Future; Expected date: 02/19/2024            Orders Placed This Encounter   Procedures    Influenza (FLUAD) - Quadrivalent (Adjuvanted) *Preferred* (65+) (PF)    Ambulatory referral/consult to ENT      FOLLOW UP: Follow up in about 6 months (around 8/12/2024), or if symptoms worsen or fail to improve, for annual exam. or sooner as needed.

## 2024-02-16 ENCOUNTER — LAB VISIT (OUTPATIENT)
Dept: LAB | Facility: HOSPITAL | Age: 84
End: 2024-02-16
Attending: INTERNAL MEDICINE
Payer: MEDICARE

## 2024-02-16 DIAGNOSIS — N18.4 BENIGN HYPERTENSION WITH CHRONIC KIDNEY DISEASE, STAGE IV: ICD-10-CM

## 2024-02-16 DIAGNOSIS — E78.5 HYPERLIPIDEMIA LDL GOAL <100: ICD-10-CM

## 2024-02-16 DIAGNOSIS — I12.9 BENIGN HYPERTENSION WITH CHRONIC KIDNEY DISEASE, STAGE IV: ICD-10-CM

## 2024-02-16 DIAGNOSIS — E11.9 CONTROLLED TYPE 2 DIABETES MELLITUS WITHOUT COMPLICATION, WITHOUT LONG-TERM CURRENT USE OF INSULIN: ICD-10-CM

## 2024-02-16 LAB
ALBUMIN SERPL BCP-MCNC: 4 G/DL (ref 3.5–5.2)
ALP SERPL-CCNC: 73 U/L (ref 55–135)
ALT SERPL W/O P-5'-P-CCNC: 11 U/L (ref 10–44)
ANION GAP SERPL CALC-SCNC: 10 MMOL/L (ref 8–16)
AST SERPL-CCNC: 16 U/L (ref 10–40)
BASOPHILS # BLD AUTO: 0.05 K/UL (ref 0–0.2)
BASOPHILS NFR BLD: 0.9 % (ref 0–1.9)
BILIRUB SERPL-MCNC: 0.7 MG/DL (ref 0.1–1)
BUN SERPL-MCNC: 27 MG/DL (ref 8–23)
CALCIUM SERPL-MCNC: 9.6 MG/DL (ref 8.7–10.5)
CHLORIDE SERPL-SCNC: 106 MMOL/L (ref 95–110)
CHOLEST SERPL-MCNC: 250 MG/DL (ref 120–199)
CHOLEST/HDLC SERPL: 5.7 {RATIO} (ref 2–5)
CO2 SERPL-SCNC: 26 MMOL/L (ref 23–29)
CREAT SERPL-MCNC: 1.7 MG/DL (ref 0.5–1.4)
DIFFERENTIAL METHOD BLD: ABNORMAL
EOSINOPHIL # BLD AUTO: 0.2 K/UL (ref 0–0.5)
EOSINOPHIL NFR BLD: 2.6 % (ref 0–8)
ERYTHROCYTE [DISTWIDTH] IN BLOOD BY AUTOMATED COUNT: 16.9 % (ref 11.5–14.5)
EST. GFR  (NO RACE VARIABLE): 29.6 ML/MIN/1.73 M^2
ESTIMATED AVG GLUCOSE: 126 MG/DL (ref 68–131)
GLUCOSE SERPL-MCNC: 80 MG/DL (ref 70–110)
HBA1C MFR BLD: 6 % (ref 4–5.6)
HCT VFR BLD AUTO: 46.6 % (ref 37–48.5)
HDLC SERPL-MCNC: 44 MG/DL (ref 40–75)
HDLC SERPL: 17.6 % (ref 20–50)
HGB BLD-MCNC: 13.6 G/DL (ref 12–16)
IMM GRANULOCYTES # BLD AUTO: 0.02 K/UL (ref 0–0.04)
IMM GRANULOCYTES NFR BLD AUTO: 0.3 % (ref 0–0.5)
LDLC SERPL CALC-MCNC: 171.6 MG/DL (ref 63–159)
LYMPHOCYTES # BLD AUTO: 2.1 K/UL (ref 1–4.8)
LYMPHOCYTES NFR BLD: 36 % (ref 18–48)
MCH RBC QN AUTO: 22.6 PG (ref 27–31)
MCHC RBC AUTO-ENTMCNC: 29.2 G/DL (ref 32–36)
MCV RBC AUTO: 77 FL (ref 82–98)
MONOCYTES # BLD AUTO: 0.5 K/UL (ref 0.3–1)
MONOCYTES NFR BLD: 8.9 % (ref 4–15)
NEUTROPHILS # BLD AUTO: 3 K/UL (ref 1.8–7.7)
NEUTROPHILS NFR BLD: 51.3 % (ref 38–73)
NONHDLC SERPL-MCNC: 206 MG/DL
NRBC BLD-RTO: 0 /100 WBC
PLATELET # BLD AUTO: 165 K/UL (ref 150–450)
PMV BLD AUTO: ABNORMAL FL (ref 9.2–12.9)
POTASSIUM SERPL-SCNC: 4.4 MMOL/L (ref 3.5–5.1)
PROT SERPL-MCNC: 7.5 G/DL (ref 6–8.4)
RBC # BLD AUTO: 6.02 M/UL (ref 4–5.4)
SODIUM SERPL-SCNC: 142 MMOL/L (ref 136–145)
TRIGL SERPL-MCNC: 172 MG/DL (ref 30–150)
WBC # BLD AUTO: 5.84 K/UL (ref 3.9–12.7)

## 2024-02-16 PROCEDURE — 80061 LIPID PANEL: CPT | Performed by: INTERNAL MEDICINE

## 2024-02-16 PROCEDURE — 85025 COMPLETE CBC W/AUTO DIFF WBC: CPT | Performed by: INTERNAL MEDICINE

## 2024-02-16 PROCEDURE — 36415 COLL VENOUS BLD VENIPUNCTURE: CPT | Mod: PO | Performed by: INTERNAL MEDICINE

## 2024-02-16 PROCEDURE — 83036 HEMOGLOBIN GLYCOSYLATED A1C: CPT | Performed by: INTERNAL MEDICINE

## 2024-02-16 PROCEDURE — 80053 COMPREHEN METABOLIC PANEL: CPT | Performed by: INTERNAL MEDICINE

## 2024-03-20 ENCOUNTER — OFFICE VISIT (OUTPATIENT)
Dept: OTOLARYNGOLOGY | Facility: CLINIC | Age: 84
End: 2024-03-20
Payer: MEDICARE

## 2024-03-20 ENCOUNTER — TELEPHONE (OUTPATIENT)
Dept: NEPHROLOGY | Facility: CLINIC | Age: 84
End: 2024-03-20
Payer: MEDICARE

## 2024-03-20 ENCOUNTER — CLINICAL SUPPORT (OUTPATIENT)
Dept: AUDIOLOGY | Facility: CLINIC | Age: 84
End: 2024-03-20
Payer: MEDICARE

## 2024-03-20 VITALS
BODY MASS INDEX: 25.12 KG/M2 | WEIGHT: 141.75 LBS | SYSTOLIC BLOOD PRESSURE: 118 MMHG | HEIGHT: 63 IN | DIASTOLIC BLOOD PRESSURE: 76 MMHG

## 2024-03-20 DIAGNOSIS — H61.23 BILATERAL IMPACTED CERUMEN: ICD-10-CM

## 2024-03-20 DIAGNOSIS — H90.3 SENSORINEURAL HEARING LOSS (SNHL) OF BOTH EARS: Primary | ICD-10-CM

## 2024-03-20 PROCEDURE — 69210 REMOVE IMPACTED EAR WAX UNI: CPT | Mod: S$GLB,,, | Performed by: OTOLARYNGOLOGY

## 2024-03-20 PROCEDURE — 92550 TYMPANOMETRY & REFLEX THRESH: CPT | Mod: S$GLB,,,

## 2024-03-20 PROCEDURE — 99203 OFFICE O/P NEW LOW 30 MIN: CPT | Mod: 25,S$GLB,, | Performed by: OTOLARYNGOLOGY

## 2024-03-20 PROCEDURE — 92557 COMPREHENSIVE HEARING TEST: CPT | Mod: S$GLB,,,

## 2024-03-20 NOTE — TELEPHONE ENCOUNTER
I called this patient in reference of informing that Dr. Riojas is a new nephrologist here at [facility], and if she needed someone to see, we are here for her. There is no referral in place; however, I was going off a list of patients with CKD.

## 2024-03-20 NOTE — PROGRESS NOTES
OTOLARYNGOLOGY CLINIC NOTE  Date:  03/20/2024     Chief complaint:  Chief Complaint   Patient presents with    Cerumen Impaction     Bilateral, left ear has a little bump/ skin tag on outside of canal       History of Present Illness  Lizbeth Hutchison is a 83 y.o. female  presenting today for a new evaluation and treatment of wax buildup and hearing loss.  Right ear hearing is worse to her -Has noticed that unable to hear as well in the right ear when talking on the phone. Has been going on since a little before February. Has not noticed issues with hearing children's voices. Has not noticed problems with sound localization. Has only noticed right hearing when using the phoen Used to see ent dr. Simmons/[facility] office - part of chart in media and reviewed.     Has noticed a bump in left ear canal. Not enlarging. No pain or irritation sensation in ear. No bleeding.     She does have history of recurrent wax buildup. Last ear cleaning before covid with javad     Past Medical History  Past Medical History:   Diagnosis Date    Alpha thalassemia trait     Anxiety     Atherosclerosis of abdominal aorta     noted on CT scan 4/6/2016    DDD (degenerative disc disease), lumbar     chronic low back pain    Diverticulosis     History of colonic polyps     last colonoscopy 2011 - normal    Hyperlipidemia LDL goal <100     unable to tolerate statins or Welchol    Hypertension     Osteopenia     no need for medication at this time - last BMD October 2010    Overweight     Type II or unspecified type diabetes mellitus without mention of complication, not stated as uncontrolled     Vitamin D deficiency disease     resolved with supplementation        Past Surgical History  Past Surgical History:   Procedure Laterality Date    CATARACT EXTRACTION Bilateral 6/25/19 8/23/19    samuel  1994    bilateral    HEMORRHOID SURGERY  1970    KELOID EXCISION  1974, 1976    abdominal wall    TOTAL ABDOMINAL HYSTERECTOMY  1972    TRIGGER  FINGER RELEASE      left hand        Medications  Current Outpatient Medications on File Prior to Visit   Medication Sig Dispense Refill    CALCIUM ORAL Take 1 tablet by mouth once daily.      colestipoL (COLESTID) 1 gram Tab Take 2 tablets (2 g total) by mouth 2 (two) times daily. 360 tablet 1    cyanocobalamin (VITAMIN B-12) 1000 MCG tablet Take 100 mcg by mouth once daily.      fluticasone (VERAMYST) 27.5 mcg/actuation nasal spray 2 sprays by Nasal route once daily.      fluticasone propionate (FLONASE) 50 mcg/actuation nasal spray SPRAY 1 SPRAY INTO EACH NOSTRIL ONCE DAILY      hydroCHLOROthiazide (HYDRODIURIL) 25 MG tablet Take 1 tablet (25 mg total) by mouth once daily. 90 tablet 1    lisinopriL (PRINIVIL,ZESTRIL) 40 MG tablet Take 1 tablet (40 mg total) by mouth once daily. 90 tablet 1    MULTIVITAMIN/IRON/FOLIC ACID (CENTRUM COMPLETE ORAL) Take 1 tablet by mouth once daily.      omega-3 fatty acids 1,000 mg Cap Take by mouth. 1 Capsule Oral Every day      vitamin D 185 MG Tab Take 185 mg by mouth once daily.       No current facility-administered medications on file prior to visit.       Review of Systems  Review of Systems   Constitutional:  Positive for malaise/fatigue.   HENT:  Positive for hearing loss and nosebleeds.    Eyes: Negative.    Respiratory: Negative.     Cardiovascular: Negative.    Gastrointestinal: Negative.    Genitourinary: Negative.    Musculoskeletal:  Positive for back pain.   Skin: Negative.    Neurological: Negative.    Psychiatric/Behavioral: Negative.          Social History   reports that she has never smoked. She has never used smokeless tobacco. She reports that she does not drink alcohol and does not use drugs.     Family History  Family History   Adopted: Yes   Problem Relation Age of Onset    Heart failure Mother     Alzheimer's disease Mother     Other Sister          from too much anesthesia    Arthritis Sister         back problems    Hypertension Sister      "Transient ischemic attack Sister     Congenital heart disease Brother     Stroke Brother     Coronary artery disease Brother         s/p stenting    Stroke Brother     Breast cancer Maternal Grandmother     Other Son         had eosinophilic granulomatosis -  shortly after lung transplant    Colon cancer Other     Other Daughter         stiffman syndrome    Diabetes Neg Hx         Physical Exam   Vitals:    24 1033   BP: 118/76    Body mass index is 25.11 kg/m².  Weight: 64.3 kg (141 lb 12.1 oz)   Height: 5' 3" (160 cm)     GENERAL: no acute distress.  HEAD: normocephalic.   EYES: lids and lashes normal. No scleral icterus  EARS: external ear without lesion, normal pinna shape and position. In canal meatus - Skin tag (flesh colored pedunculated tag, no ulceration no erythema, soft to palpation) in left ear canal  , slight dry skin around the area  External auditory canal with b/l cerumen impactions  NOSE: external nose without significant bony abnormality  ORAL CAVITY/OROPHARYNX: tongue mobile.  NECK: trachea midline.   RESPIRATORY: no stridor, no stertor. Voice normal. Respirations nonlabored.  NEURO: alert, responds to questions appropriately.   PSYCH:mood appropriate    Procedure Note   Procedure performed:microscopic examination of ears with cerumen disimpaction    Indication for procedure: bilateral cerumen impaction     Description of procedure:  After verbal consent was obtained, the patient was positioned in semi recumbent position and speculum was placed in the right ear and microscope brought into the field.  The microscope was positioned and magnification adjusted for appropriate visualization. Otologic instruments including various size otologic suctions and curette were used to remove the cerumen from the right external auditory canals under visualization with the operating microscope. After cleaning, visualization was again performed and at various levels of higher magnification to optimize " views of the ear canal, tympanic membrane, ossicles and middle ear space. The same procedure was then repeated for the left ear. Findings as indicated below. All portions of the procedure and examination by otomicroscopy were tolerated well without complication.     Findings:  Right ear: Complete cerumen impaction removed entirely revealing normal external auditory canal; tympanic membrane without bulging, retraction, or perforation; no evidence of middle ear fluid or effusion.   Left ear: Complete cerumen impaction removed entirely revealing normal external auditory canal; tympanic membrane without bulging, retraction, or perforation; no evidence of middle ear fluid or effusion.     AUDIOLOGY RESULTS  Audiometric evaluation including audiogram, tympanometry, acoustic reflexes, and speech discrimination which was performed 3-20-24 was personally reviewed and interpreted.  Notable findings on the audiogram were normal hearing sloping to mild sensorineural hearing loss (SNHL) at 3khz for left ear and 4 khz for right ear with both ears steeply sloping to moderate-severe snhl at high frequency. 20 dbHL asymmetry with right ear  compared to left ear at 6 khz .  Tympanometry revealed Type A tympanogram on the left and Type A tympanogram on the right. Speech discrimination was 96%  on the left, and 92% on the right.     Report of the audiologist performing this audiometric testing was also reviewed     Audiogram from 2013 also reviewed    Imaging:  The patient does not have any pertinent and/or recent imaging of the head and neck.     Labs:  CBC  Recent Labs   Lab 01/28/22  1000 02/01/23  0745 02/16/24  0730   WBC 5.41 5.69 5.84   Hemoglobin 13.3 13.4 13.6   Hematocrit 46.3 47.7 46.6   MCV 78 L 79 L 77 L   Platelets 170 190 165     BMP  Recent Labs   Lab 08/03/22  0836 02/01/23  0745 02/16/24  0730   Glucose 99 89 80   Sodium 138 145 142   Potassium 4.3 3.7 4.4   Chloride 103 106 106   CO2 28 29 26   BUN 20 23 27 H    Creatinine 1.7 H 1.7 H 1.7 H   Calcium 9.4 9.5 9.6     COAGS        Assessment  1. Bilateral impacted cerumen  - Ambulatory referral/consult to ENT    2. Sensorineural hearing loss (SNHL) of both ears       Plan:  Discussed plan of care with patient in detail and all questions answered. Patient reported understanding of plan of care. I gave the patient the opportunity to ask questions and patient confirmed all questions answered to satisfaction.     Reviewed old hearing test and test from today with patient. Has had some worsening at high frequency since test 10 years ago but not out of expected range. Other than asym at 1 frequency ( which could be subjective error) , findings consistent with presbycusis.     We Discussed about slight asymmetry on test and differential diagnosis including but not limited to acoustic neuroma. Discussed about this condition and mri iac needed for diagnosis.  Would definitely recommend mri iac if hearing on right side when talking on phone still sounds off as this would be concerning that finding on test in not false positive. Even with the chance of false positive asymmetry, discussed about acoustic neuroma and offered mri iac now but reasonable to recheck hearing in a year only if asymptomatic. If symptomatic of unilateral loss needs mri now. She did have b/l impactions, right ear was more extensive and since she has only noticed on the phone unclear. She understands options and prefers to hold off unless she is still noticing the problem and understands to contact me ASAP for mri if that is the case. Discussed the wax can temporarily drop hearing but now that wax is gone, if that was the cause then should not be happening anymore    Had significant amount of wax today, would like to see her in 6 months to see how she is doing. May be able to do yearly checks but can prevent issue sometimes with visits. Discussed that sometimes start making more wax as well. Unclear since last  time had cleaning was several years ago. If ears have minimal wax at 6 month and year follow up can do prn ( also going to see her in a year for repeat hearing test) .     Debrox 1-2 times per month to help wax egress, do not  use water flush. No qtips  Notify me immediately if sudden hearing changes discussed  Discussed about hearing aids and potential prevention of alzheimers   If decides to get hearing aids, get customized aid to prevent noise induced damage    F/u 6 months for ear check, sooner if issue        Please be aware that this note has been generated with the assistance of [employer] voice-to-text.  Please excuse any spelling or grammatical errors.

## 2024-08-19 ENCOUNTER — TELEPHONE (OUTPATIENT)
Dept: NEPHROLOGY | Facility: CLINIC | Age: 84
End: 2024-08-19
Payer: MEDICARE

## 2024-08-19 NOTE — TELEPHONE ENCOUNTER
I spoke with this patient in reference of being scheduled to see a nephrologist. Patient states that she would like to consult her PCP to see if this is something she needs. Informed that we were here if she needs us.

## 2024-09-05 ENCOUNTER — HOSPITAL ENCOUNTER (OUTPATIENT)
Dept: RADIOLOGY | Facility: HOSPITAL | Age: 84
Discharge: HOME | End: 2024-09-05
Attending: INTERNAL MEDICINE
Payer: MEDICARE

## 2024-09-05 DIAGNOSIS — Z12.31 ENCOUNTER FOR SCREENING MAMMOGRAM FOR BREAST CANCER: ICD-10-CM

## 2024-09-05 PROCEDURE — 77067 SCR MAMMO BI INCL CAD: CPT | Mod: TC,PO

## 2024-09-14 ENCOUNTER — HOSPITAL ENCOUNTER (EMERGENCY)
Facility: HOSPITAL | Age: 84
Discharge: HOME | End: 2024-09-14
Attending: EMERGENCY MEDICINE
Payer: MEDICARE

## 2024-09-14 VITALS
TEMPERATURE: 98 F | HEART RATE: 66 BPM | WEIGHT: 140 LBS | DIASTOLIC BLOOD PRESSURE: 58 MMHG | HEIGHT: 63 IN | BODY MASS INDEX: 24.8 KG/M2 | RESPIRATION RATE: 18 BRPM | SYSTOLIC BLOOD PRESSURE: 130 MMHG | OXYGEN SATURATION: 96 %

## 2024-09-14 DIAGNOSIS — M79.671 RIGHT FOOT PAIN: Primary | ICD-10-CM

## 2024-09-14 PROCEDURE — 25000003 PHARM REV CODE 250: Performed by: PHYSICIAN ASSISTANT

## 2024-09-14 PROCEDURE — 99283 EMERGENCY DEPT VISIT LOW MDM: CPT | Mod: 25

## 2024-09-14 RX ORDER — ACETAMINOPHEN 325 MG/1
650 TABLET ORAL
Status: COMPLETED | OUTPATIENT
Start: 2024-09-14 | End: 2024-09-14

## 2024-09-14 RX ORDER — DICLOFENAC SODIUM 10 MG/G
2 GEL TOPICAL 3 TIMES DAILY
Qty: 100 G | Refills: 0 | Status: SHIPPED | OUTPATIENT
Start: 2024-09-14 | End: 2024-09-24

## 2024-09-14 RX ADMIN — ACETAMINOPHEN 650 MG: 325 TABLET ORAL at 09:09

## 2024-09-14 NOTE — ED PROVIDER NOTES
Encounter Date: 9/14/2024    SCRIBE #1 NOTE: I, Sebastian Polanco, am scribing for, and in the presence of,  Jarrell Hoang PA-C. I have scribed the following portions of the note - Other sections scribed: HPI,ROS.       History     Chief Complaint   Patient presents with    Foot Pain     Pt reports being woken up with severe Throbbing pain to her right foot at 0200  this am.  Pt did not take any medication.  Pt is ambulatory in triage with a cane.   Pt denies any fall or trauma     Lizbeth Hutchison is a 84 y.o. female, with a PMHx of sciatica, gout DDD, HTN, osteopenia, DM, HLD, who presents to the ED with right foot pain onset 7 hours ago. Patient reports associated symptoms of redness and minor swelling to right foot. Patient reports that the pain awoke her from her sleep, patient states that the pain is a throbbing sensation but denies it being similar to past episodes of gout. Patient denies any trauma occurring to the area or taking any medications to alleviate her symptoms. 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 No other exacerbating or alleviating factors.     The history is provided by the patient. No  was used.     Review of patient's allergies indicates:   Allergen Reactions    Cholesterol analogues Other (See Comments)     Muscle spasam    Clindamycin Hives    Statins-hmg-coa reductase inhibitors Other (See Comments)    Sulfa (sulfonamide antibiotics)     Tramadol Nausea And Vomiting    Welchol [colesevelam] Other (See Comments)    Codeine Rash    Penicillins Rash     Past Medical History:   Diagnosis Date    Alpha thalassemia trait     Anxiety     Atherosclerosis of abdominal aorta     noted on CT scan 4/6/2016    DDD (degenerative disc disease), lumbar     chronic low back pain    Diverticulosis     History of colonic polyps     last colonoscopy 2011 - normal    Hyperlipidemia LDL goal <100     unable to  tolerate statins or Welchol    Hypertension     Osteopenia     no need for medication at this time - last BMD 2010    Overweight     Type II or unspecified type diabetes mellitus without mention of complication, not stated as uncontrolled     Vitamin D deficiency disease     resolved with supplementation     Past Surgical History:   Procedure Laterality Date    CATARACT EXTRACTION Bilateral 19    samuel      bilateral    HEMORRHOID SURGERY  1970    KELOID EXCISION  ,     abdominal wall    TOTAL ABDOMINAL HYSTERECTOMY      TRIGGER FINGER RELEASE      left hand     Family History   Adopted: Yes   Problem Relation Name Age of Onset    Heart failure Mother      Alzheimer's disease Mother      Other Sister Jocelyne          from too much anesthesia    Arthritis Sister Sally         back problems    Hypertension Sister Sally     Transient ischemic attack Sister Sally     Congenital heart disease Brother Mark     Stroke Brother Azalia     Coronary artery disease Brother Bonifacio         s/p stenting    Stroke Brother Bonifacio     Breast cancer Maternal Grandmother      Other Son          had eosinophilic granulomatosis -  shortly after lung transplant    Colon cancer Other grandson - Dmitriy     Other Daughter          stiffman syndrome    Diabetes Neg Hx       Social History     Tobacco Use    Smoking status: Never    Smokeless tobacco: Never   Substance Use Topics    Alcohol use: No    Drug use: Never     Review of Systems   Constitutional:  Negative for fever.   HENT:  Negative for congestion, sore throat and trouble swallowing.    Respiratory:  Negative for cough and shortness of breath.    Cardiovascular:  Negative for chest pain.   Gastrointestinal:  Negative for abdominal pain, constipation, diarrhea, nausea and vomiting.   Genitourinary:  Negative for dysuria, flank pain, frequency and urgency.   Musculoskeletal:  Positive for myalgias. Negative for back pain.   Skin:   Positive for color change. Negative for rash.   Neurological:  Negative for headaches.   All other systems reviewed and are negative.      Physical Exam     Initial Vitals [09/14/24 0855]   BP Pulse Resp Temp SpO2   (!) 130/58 66 18 98.1 °F (36.7 °C) 96 %      MAP       --         Physical Exam    Nursing note and vitals reviewed.  Constitutional: She appears well-developed and well-nourished. She is not diaphoretic. No distress.   HENT:   Head: Atraumatic.   Right Ear: External ear normal.   Left Ear: External ear normal.   Eyes: Conjunctivae and EOM are normal.   Neck: No tracheal deviation present. No JVD present.   Normal range of motion.  Cardiovascular:  Normal rate and regular rhythm.           Pulmonary/Chest: No accessory muscle usage or stridor. No tachypnea. No respiratory distress.   Musculoskeletal:         General: Normal range of motion.      Cervical back: Normal range of motion.      Comments: Quarter-sized area of mild redness to the right lateral mid foot with mild focal tenderness.  No warmth or discernible swelling.  Normal skin perfusion.  Full weight-bearing and ambulatory with assistance of walking cane, but with mild limp to the right side.  No ulcerations.     Neurological: She is alert and oriented to person, place, and time. She displays no tremor. She displays no seizure activity. Coordination and gait normal.   Skin: Skin is intact.         ED Course   Procedures  Labs Reviewed - No data to display       Imaging Results              X-Ray Foot Complete Right (Final result)  Result time 09/14/24 09:33:26      Final result by Harry Miles MD (09/14/24 09:33:26)                   Impression:      As above.      Electronically signed by: Harry Miles MD  Date:    09/14/2024  Time:    09:33               Narrative:    EXAMINATION:  XR FOOT COMPLETE 3 VIEW RIGHT    CLINICAL HISTORY:  . Pain in right foot    TECHNIQUE:  AP, lateral, and oblique views of the right foot were  performed.    COMPARISON:  None.    FINDINGS:  Postoperative changes about the 5th proximal interphalangeal joint.  No acute fracture or dislocation.  Lisfranc articulation is congruent.  Mild degenerative changes noted.  Soft tissues are unremarkable.                                       Medications   acetaminophen tablet 650 mg (650 mg Oral Given 9/14/24 0908)     Medical Decision Making  In the absence of other findings, likely has mild tendinitis.  Teaches dance.  Screening x-ray shows no bony instability.  No signs of cellulitis, septic joint, or gout.  No foreign body or other signs of trauma.  Supportive care.    Amount and/or Complexity of Data Reviewed  External Data Reviewed: notes.  Radiology: ordered.    Risk  OTC drugs.            Scribe Attestation:   Scribe #1: I performed the above scribed service and the documentation accurately describes the services I performed. I attest to the accuracy of the note.              I, Jarrell Hoang PA-C, personally performed the services described in this documentation. All medical record entries made by the scribe were at my direction and in my presence. I have reviewed the chart and agree that the record reflects my personal performance and is accurate and complete.      DISCLAIMER: This note was prepared with [employer] voice recognition transcription software. Garbled syntax, mangled pronouns, and other bizarre constructions may be attributed to that software system.                  Clinical Impression:  Final diagnoses:  [M79.671] Right foot pain (Primary)          ED Disposition Condition    Discharge Stable          ED Prescriptions       Medication Sig Dispense Start Date End Date Auth. Provider    diclofenac sodium (VOLTAREN) 1 % Gel Apply 2 g topically 3 (three) times daily. for 10 days 100 g 9/14/2024 9/24/2024 Jarrell Hoang PA-C          Follow-up Information       Follow up With Specialties Details Why Contact Info Additional Information    Anthony  Nusrat LUNA MD Internal Medicine, Pediatrics Schedule an appointment as soon as possible for a visit in 1 day For re-evaluation [address] [facility] [address]  [phone]       [facility] Schedule an appointment as soon as possible for a visit in 1 day For further evaluation, For more definitive management of your symptoms [address] [facility] [address] [phone] 1st Floor    [facility] - Emergency Dept Emergency Medicine Go to  If symptoms worsen or new symptoms develop [address] [facility] [place] [address]  [phone]              Jarrell Hoang, PA-C  09/14/24 1042

## 2024-09-14 NOTE — DISCHARGE INSTRUCTIONS
Thank you for coming to our Emergency Department today. It is important to remember that some problems or medical conditions are difficult to diagnose and may not be found or addressed during your Emergency Department visit.  These conditions often start with non-specific symptoms and can only be diagnosed on follow up visits with your primary care physician or specialist when the symptoms continue or change. Please remember that all medical conditions can change, and we cannot predict how you will be feeling tomorrow or the next day. Return to the ER with any questions/concerns, new/concerning symptoms, worsening or failure to improve.       Be sure to follow up with your primary care doctor and review all labs/imaging/tests that were performed during your ER visit with them. It is very common for us to identify non-emergent incidental findings which must be followed up with your primary care physician.  Some labs/imaging/tests may be outside of the normal range, and require non-emergent follow-up and/or further investigation/treatment/procedures/testing to help diagnose/exclude/prevent complications or other potentially serious medical conditions. Some abnormalities may not have been discussed or addressed during your ER visit.     An ER visit does not replace a primary care visit, and many screening tests or follow-up tests cannot be ordered by an ER doctor or performed by the ER. Some tests may even require pre-approval.    If you do not have a primary care doctor, you may contact the one listed on your discharge paperwork or you may also call the [facility] Appointment Desk at [phone] , or [address] [place] [address] at  [phone] to schedule an appointment, or establish care with a primary care doctor or even a specialist and to obtain information about local resources. It is important to your health that you have a primary care doctor.    Please take all medications as directed. We have done our best to select  a medication for you that will treat your condition however, all medications may potentially have side-effects and it is impossible to predict which medications may give you side-effects or what those side-effects (if any) those medications may give you.  If you feel that you are having a negative effect or side-effect of any medication you should stop taking those medications immediately and seek medical attention. If you feel that you are having a life-threatening reaction call 911.        Do not drive, swim, climb to height, take a bath, operate heavy machinery, drink alcohol or take potentially sedating medications, sign any legal documents or make any important decisions for 24 hours if you have received any pain medications, sedatives or mood altering drugs during your ER visit or within 24 hours of taking them if they have been prescribed to you.     You can find additional resources for Dentists, hearing aids, durable medical equipment, low cost pharmacies and other resources at [email or web address]

## 2024-09-16 ENCOUNTER — OFFICE VISIT (OUTPATIENT)
Dept: URGENT CARE | Facility: CLINIC | Age: 84
End: 2024-09-16
Payer: MEDICARE

## 2024-09-16 VITALS
TEMPERATURE: 98 F | HEIGHT: 63 IN | DIASTOLIC BLOOD PRESSURE: 75 MMHG | WEIGHT: 140 LBS | HEART RATE: 85 BPM | RESPIRATION RATE: 18 BRPM | BODY MASS INDEX: 24.8 KG/M2 | SYSTOLIC BLOOD PRESSURE: 168 MMHG | OXYGEN SATURATION: 98 %

## 2024-09-16 DIAGNOSIS — M10.9 ACUTE GOUT OF RIGHT FOOT, UNSPECIFIED CAUSE: Primary | ICD-10-CM

## 2024-09-16 RX ORDER — DEXAMETHASONE SODIUM PHOSPHATE 10 MG/ML
4 INJECTION INTRAMUSCULAR; INTRAVENOUS
Status: COMPLETED | OUTPATIENT
Start: 2024-09-16 | End: 2024-09-16

## 2024-09-16 RX ORDER — DEXAMETHASONE SODIUM PHOSPHATE 4 MG/ML
4 INJECTION, SOLUTION INTRA-ARTICULAR; INTRALESIONAL; INTRAMUSCULAR; INTRAVENOUS; SOFT TISSUE
Status: DISCONTINUED | OUTPATIENT
Start: 2024-09-16 | End: 2024-09-16

## 2024-09-16 RX ADMIN — DEXAMETHASONE SODIUM PHOSPHATE 4 MG: 10 INJECTION INTRAMUSCULAR; INTRAVENOUS at 03:09

## 2024-09-16 NOTE — PROGRESS NOTES
"Subjective:      Patient ID: Lizbeth Hutchison is a 84 y.o. female.    Vitals:  height is 5' 3" (1.6 m) and weight is 63.5 kg (140 lb). Her oral temperature is 98.4 °F (36.9 °C). Her blood pressure is 168/75 (abnormal) and her pulse is 85. Her respiration is 18 and oxygen saturation is 98%.     Chief Complaint: Foot Swelling    Patient presents with swelling, erythema and pain in right foot that started on Saturday.  She states pain started in right lower lateral aspect of her foot and imaging performed in the ED did not show any acute fracture.  Patient was diagnosed with tendinitis    Pt states she has had RT foot swelling and pain since Saturday . Pt said she was seen at ER and had xrays done ,but says she is still in a lot of pain. Pt took tylenol with no relief.    Foot Injury   Incident onset: Saturday. The incident occurred at home. There was no injury mechanism. The pain is present in the right ankle and right foot. The quality of the pain is described as shooting. The pain is at a severity of 8/10. The pain is moderate. The pain has been Constant since onset. Associated symptoms include an inability to bear weight. She reports no foreign bodies present. The symptoms are aggravated by weight bearing. Treatments tried: tylenol. The treatment provided no relief.       Musculoskeletal:  Positive for joint swelling and gout.      Objective:     Physical Exam   Constitutional: She is oriented to person, place, and time. She appears well-developed. She is cooperative.   HENT:   Head: Normocephalic and atraumatic.   Ears:   Right Ear: Hearing and external ear normal.   Left Ear: Hearing and external ear normal.   Nose: Nose normal. No mucosal edema or nasal deformity. No epistaxis. Right sinus exhibits no maxillary sinus tenderness and no frontal sinus tenderness. Left sinus exhibits no maxillary sinus tenderness and no frontal sinus tenderness.   Mouth/Throat: Uvula is midline, oropharynx is clear and moist " and mucous membranes are normal. No trismus in the jaw. Normal dentition. No uvula swelling.   Eyes: Conjunctivae and lids are normal.   Neck: Trachea normal and phonation normal. Neck supple.   Cardiovascular: Normal rate, regular rhythm and normal pulses.   Pulmonary/Chest: Effort normal.   Abdominal: Normal appearance.   Musculoskeletal: Normal range of motion.         General: Normal range of motion.      Right ankle: She exhibits swelling.      Comments: Right Ankle with erythema, warmth, swelling and tenderness   Neurological: She is alert and oriented to person, place, and time. She exhibits normal muscle tone.   Skin: Skin is warm, dry and intact.   Psychiatric: Her speech is normal and behavior is normal. Judgment and thought content normal.   Nursing note and vitals reviewed.      Assessment:     1. Acute gout of right foot, unspecified cause        Plan:       Acute gout of right foot, unspecified cause  -     Discontinue: dexAMETHasone injection 4 mg  -     dexAMETHasone injection 4 mg      Take Tylenol as needed for pain    Follow up with PCP as scheduled    See attached for foods to help prevent gout flare

## 2024-09-28 ENCOUNTER — OFFICE VISIT (OUTPATIENT)
Dept: URGENT CARE | Facility: CLINIC | Age: 84
End: 2024-09-28
Payer: MEDICARE

## 2024-09-28 ENCOUNTER — NURSE TRIAGE (OUTPATIENT)
Dept: ADMINISTRATIVE | Facility: CLINIC | Age: 84
End: 2024-09-28
Payer: MEDICARE

## 2024-09-28 VITALS
SYSTOLIC BLOOD PRESSURE: 150 MMHG | TEMPERATURE: 99 F | BODY MASS INDEX: 24.8 KG/M2 | RESPIRATION RATE: 16 BRPM | WEIGHT: 140 LBS | OXYGEN SATURATION: 98 % | HEIGHT: 63 IN | HEART RATE: 68 BPM | DIASTOLIC BLOOD PRESSURE: 61 MMHG

## 2024-09-28 DIAGNOSIS — M10.9 ACUTE GOUT OF MULTIPLE SITES, UNSPECIFIED CAUSE: Primary | ICD-10-CM

## 2024-09-28 PROCEDURE — 96372 THER/PROPH/DIAG INJ SC/IM: CPT | Mod: S$GLB,,,

## 2024-09-28 PROCEDURE — 99213 OFFICE O/P EST LOW 20 MIN: CPT | Mod: 25,S$GLB,,

## 2024-09-28 RX ORDER — DEXAMETHASONE SODIUM PHOSPHATE 10 MG/ML
5 INJECTION INTRAMUSCULAR; INTRAVENOUS
Status: COMPLETED | OUTPATIENT
Start: 2024-09-28 | End: 2024-09-28

## 2024-09-28 RX ADMIN — DEXAMETHASONE SODIUM PHOSPHATE 5 MG: 10 INJECTION INTRAMUSCULAR; INTRAVENOUS at 09:09

## 2024-09-28 NOTE — TELEPHONE ENCOUNTER
Patient transferred from patient access. Had episode of gout 9/16 and seen at  and given half an injection. [facility] was told to follow up with PCP for PO RX. Due to concern re kidney function. Woke up today in terrible pain, states veins are bulging on foot. Triage done- dispo see provider in 4 hours, advised to go to , her daughter in law is on her way over and will bring her. Advised I will send message to provider re follow up.   Reason for Disposition   [1] SEVERE pain (e.g., excruciating, unable to do any normal activities) AND [2] not improved after 2 hours of pain medicine    Additional Information   Negative: Entire foot is cool or blue in comparison to other foot   Negative: Purple or black skin on foot or toe   Negative: [1] Red area or streak AND [2] fever   Negative: [1] Swollen foot AND [2] fever   Negative: Patient sounds very sick or weak to the triager    Protocols used: Foot Pain-A-AH

## 2024-09-28 NOTE — PATIENT INSTRUCTIONS
Please return here or go to the Emergency Department for any concerns or worsening of condition.    You received a steroid injection today - this can elevate your blood pressure, elevate your blood sugar, cause weight gain, nervous energy, redness to the face and dimpling of the skin where the injection was administered.    Please apply VOLTAREN GEL to the affected area to help with pain/inflammation.    You were given a referral to PODIATRY, please call [phone] for scheduling and appointments.     Please consider consulting your primary care provider regarding preventative measures such as ALLOPURINOL.    Please follow up with your primary care doctor or specialist as needed.    If you  smoke, please stop smoking.

## 2024-09-28 NOTE — PROGRESS NOTES
"Subjective:      Patient ID: Lizbeth Hutchison is a 84 y.o. female.    Vitals:  height is 5' 3" (1.6 m) and weight is 63.5 kg (139 lb 15.9 oz). Her oral temperature is 98.5 °F (36.9 °C). Her blood pressure is 150/61 (abnormal) and her pulse is 68. Her respiration is 16 and oxygen saturation is 98%.     Chief Complaint: Foot Injury    Patient present today for right foot pain that started yesterday night. Patient has hx of gout.    Provider note starts here:     85 yo female with PMH of HTN, HLD, DM. Primary concerns for today's visit is right foot pain. She states that she believes that she is having a gout flare up. Patient states that she had liver, will, red beans/ rice, mushroom, cauliflower. She states that she went to the ER for her flare up and received a cream that did not help. She states that she was seen in the urgent care and received dexamethasone to help with her flare up. Patient states that they also reports swelling, redness. Patient states that movement and walking worsens symptoms and nothing alleviates symptoms. Patient denies fever, chills, cp, sob, numbness, tingling, trauma or injury.    Foot Injury   The incident occurred 6 to 12 hours ago. The incident occurred at home. There was no injury mechanism. The pain is present in the right leg and right foot. The quality of the pain is described as aching and stabbing. The pain is at a severity of 10/10. The pain is severe. The pain has been Constant since onset. Pertinent negatives include no inability to bear weight, loss of motion, loss of sensation, muscle weakness, numbness or tingling. She reports no foreign bodies present. Nothing aggravates the symptoms. She has tried ice for the symptoms. The treatment provided no relief.       Constitution: Negative for chills and fever.   Cardiovascular:  Negative for chest pain and sob on exertion.   Musculoskeletal:  Positive for pain.   Skin:  Positive for erythema.   Neurological:  Negative for " numbness and tingling.     Objective:     Physical Exam   Constitutional:  Non-toxic appearance. She does not appear ill. No distress.      Comments:Patient is in no acute distress, patient is non-toxic appearing, patient is ox3, patient is answering question appropriately.   normal  Abdominal: Normal appearance.   Musculoskeletal:      Right foot: Decreased range of motion (due to pain). Normal capillary refill. Tenderness and swelling present. No crepitus, laceration, Charcot foot, foot drop or prominent metatarsal heads.      Comments: 5/5 strength of right foot. Decreased ROM. Sensation intact. Distal pulses, 2+ bilaterally. Capillary refill,< 2 seconds. Patient is able to ambulate in exam room and throughout clinic. There is erythema and TTP. There is swelling, redness, warmth on exam. There is monoarticular tenderness with isolation of the 1st metatarsal and ankle consistent with gout.   Neurological: She is alert.   Skin: Skin is not diaphoretic. not right footerythema   Nursing note and vitals reviewed.    Assessment:     1. Acute gout of multiple sites, unspecified cause      Plan:   Previous notes reviewed.  Vital signs reviewed.  Labs reviewed. GFR and creatinine reviewed.  Discussed acute gout flare up, home care, tx options, and given follow up precautions.  Patient was briefed on my thought process and diagnosis.   Patient involved with the treatment plan and agreed to the plan. Gout flare up is likely due to diet. Patient is not a candidate for NSAID treatment and colchicine is not indicated due to CrCl being <30.  Patient informed on warning signs, patient understood warning signs and to go to urgent care or ER if warning signs appear.  Please excuse grammatical/spelling errors appreciated throughout this visit encounter for a remote dictation device was used during this encounter.    Patient Instructions   Please return here or go to the Emergency Department for any concerns or worsening of  condition.    You received a steroid injection today - this can elevate your blood pressure, elevate your blood sugar, cause weight gain, nervous energy, redness to the face and dimpling of the skin where the injection was administered.    Please apply VOLTAREN GEL to the affected area to help with pain/inflammation.    You were given a referral to PODIATRY, please call [phone] for scheduling and appointments.     Please follow up with your primary care doctor or specialist as needed.    If you  smoke, please stop smoking.    Acute gout of multiple sites, unspecified cause  -     dexAMETHasone injection 5 mg  -     Ambulatory referral/consult to Podiatry      Yelena Rothman PA-C

## 2024-09-30 ENCOUNTER — TELEPHONE (OUTPATIENT)
Dept: FAMILY MEDICINE | Facility: CLINIC | Age: 84
End: 2024-09-30
Payer: MEDICARE

## 2024-09-30 NOTE — TELEPHONE ENCOUNTER
----- Message from Felix sent at 9/30/2024  8:33 AM CDT -----  Regarding: Ambulatory referral/consult to Podiatry- Urgent  Good morning,     Patient is requesting a callback ASAP this morning requesting gout medication change, and discuss injections received at [facility] on 9/28/24, regarding referral/consult to Podiatry- Urgent, not sure if this referral is needed. Please give the patient a callback at [phone], and if no answer call home phone [phone].    Dx:Acute gout of multiple sites, unspecified cause [M10.9]    Thank you.    CELESTINE Schaefer

## 2024-10-04 ENCOUNTER — OFFICE VISIT (OUTPATIENT)
Dept: FAMILY MEDICINE | Facility: CLINIC | Age: 84
End: 2024-10-04
Payer: MEDICARE

## 2024-10-04 VITALS
HEIGHT: 63 IN | OXYGEN SATURATION: 97 % | HEART RATE: 74 BPM | DIASTOLIC BLOOD PRESSURE: 76 MMHG | WEIGHT: 140.44 LBS | BODY MASS INDEX: 24.88 KG/M2 | SYSTOLIC BLOOD PRESSURE: 122 MMHG | TEMPERATURE: 98 F

## 2024-10-04 DIAGNOSIS — E11.9 CONTROLLED TYPE 2 DIABETES MELLITUS WITHOUT COMPLICATION, WITHOUT LONG-TERM CURRENT USE OF INSULIN: ICD-10-CM

## 2024-10-04 DIAGNOSIS — M10.9 GOUT, UNSPECIFIED CAUSE, UNSPECIFIED CHRONICITY, UNSPECIFIED SITE: Primary | ICD-10-CM

## 2024-10-04 DIAGNOSIS — I12.9 BENIGN HYPERTENSION WITH CHRONIC KIDNEY DISEASE, STAGE IV: ICD-10-CM

## 2024-10-04 DIAGNOSIS — N18.4 BENIGN HYPERTENSION WITH CHRONIC KIDNEY DISEASE, STAGE IV: ICD-10-CM

## 2024-10-04 PROCEDURE — 99215 OFFICE O/P EST HI 40 MIN: CPT | Mod: PBBFAC,PO

## 2024-10-04 PROCEDURE — 99999 PR PBB SHADOW E&M-EST. PATIENT-LVL V: CPT | Mod: PBBFAC,,,

## 2024-10-04 RX ORDER — ALLOPURINOL 100 MG/1
50 TABLET ORAL
Qty: 30 TABLET | Refills: 0 | Status: SHIPPED | OUTPATIENT
Start: 2024-10-04

## 2024-10-04 NOTE — PROGRESS NOTES
HPI     Chief Complaint:  Chief Complaint   Patient presents with    foot pain    Gout       Lizbeth Hutchison is a 84 y.o. female with multiple medical diagnoses as listed in the medical history and problem list that presents for   Chief Complaint   Patient presents with    foot pain    Gout    .     Patient is not known to me with her last appointment in this department on Visit date not found.     HPI  Pt presents for gout pain.  Recurrent gout attacks over the past two weeks. Recently ate liver, spinach and mushrooms as she didn't know these foods could cause a gout attack. Urgent care administered decadron which helped some. Contacted by nephrology due to chronic kidney disease but unsure about scheduling.      Assessment & Plan     Problem List Items Addressed This Visit       Benign hypertension with chronic kidney disease, stage IV  Blood pressure 122/76.  Patient contacted by Nephrology due to chronic kidney disease.  Patient requesting referral to Nephrology, we will refer to Nephrology.    Relevant Orders    Ambulatory referral/consult to Nephrology    Controlled type 2 diabetes mellitus without complication, without long-term current use of insulin  Stable. The current medical regimen is effective;  continue present plan and medications.    Hemoglobin A1C   Date Value Ref Range Status   02/16/2024 6.0 (H) 4.0 - 5.6 % Final     Comment:     ADA Screening Guidelines:  5.7-6.4%  Consistent with prediabetes  >or=6.5%  Consistent with diabetes    High levels of fetal hemoglobin interfere with the HbA1C  assay. Heterozygous hemoglobin variants (HbS, HgC, etc)do  not significantly interfere with this assay.   However, presence of multiple variants may affect accuracy.     08/07/2023 5.9 (H) 4.0 - 5.6 % Final     Comment:     ADA Screening Guidelines:  5.7-6.4%  Consistent with prediabetes  >or=6.5%  Consistent with diabetes    High levels of fetal hemoglobin interfere with the HbA1C  assay. Heterozygous  hemoglobin variants (HbS, HgC, etc)do  not significantly interfere with this assay.   However, presence of multiple variants may affect accuracy.     02/01/2023 6.1 (H) 4.0 - 5.6 % Final     Comment:     ADA Screening Guidelines:  5.7-6.4%  Consistent with prediabetes  >or=6.5%  Consistent with diabetes    High levels of fetal hemoglobin interfere with the HbA1C  assay. Heterozygous hemoglobin variants (HbS, HgC, etc)do  not significantly interfere with this assay.   However, presence of multiple variants may affect accuracy.           Overview     - diet controlled.          Other Visit Diagnoses       Gout, unspecified cause, unspecified chronicity, unspecified site    -  Primary  Recurrent gout attacks over the past 2 weeks.  We will order allopurinol.  We will refer to Rheumatology.    Relevant Medications    allopurinoL (ZYLOPRIM) 100 MG tablet    Other Relevant Orders    Ambulatory referral/consult to Rheumatology              --------------------------------------------      Health Maintenance:  Health Maintenance         Date Due Completion Date    TETANUS VACCINE Never done ---    RSV Vaccine (Age 60+ and Pregnant patients) (1 - 1-dose 75+ series) Never done ---    DEXA Scan 01/19/2023 1/19/2021    Override on 10/11/2011: Done (osteopenia)    Eye Exam 08/11/2024 8/11/2023    Override on 3/18/2015: Done    Override on 3/4/2014: Done    Override on 1/28/2013: Done    Hemoglobin A1c 08/16/2024 2/16/2024    Influenza Vaccine (1) 09/01/2024 2/12/2024    COVID-19 Vaccine (5 - 2024-25 season) 09/01/2024 1/17/2023    Shingles Vaccine (1 of 2) 10/04/2025 (Originally 4/3/1990) ---    Diabetes Urine Screening 02/16/2025 2/16/2024    Lipid Panel 02/16/2025 2/16/2024            Health maintenance reviewed    Follow Up:  No follow-ups on file.    Exam     Review of Systems:  (as noted above)  Review of Systems    Physical Exam:   Physical Exam  Constitutional:       Appearance: Normal appearance.   Cardiovascular:       "Rate and Rhythm: Normal rate and regular rhythm.   Pulmonary:      Effort: Pulmonary effort is normal.      Breath sounds: Normal breath sounds.   Musculoskeletal:      Right ankle: Swelling present.      Right foot: Tenderness present.   Neurological:      Mental Status: She is alert.       Vitals:    10/04/24 0904   BP: 122/76   BP Location: Right arm   Patient Position: Sitting   Pulse: 74   Temp: 98.2 °F (36.8 °C)   TempSrc: Oral   SpO2: 97%   Weight: 63.7 kg (140 lb 6.9 oz)   Height: 5' 3" (1.6 m)      Body mass index is 24.88 kg/m².        History     Past Medical History:  Past Medical History:   Diagnosis Date    Alpha thalassemia trait     Anxiety     Atherosclerosis of abdominal aorta     noted on CT scan 4/6/2016    DDD (degenerative disc disease), lumbar     chronic low back pain    Diverticulosis     History of colonic polyps     last colonoscopy 2011 - normal    Hyperlipidemia LDL goal <100     unable to tolerate statins or Welchol    Hypertension     Osteopenia     no need for medication at this time - last BMD October 2010    Overweight     Type II or unspecified type diabetes mellitus without mention of complication, not stated as uncontrolled     Vitamin D deficiency disease     resolved with supplementation       Past Surgical History:  Past Surgical History:   Procedure Laterality Date    CATARACT EXTRACTION Bilateral 6/25/19 8/23/19    samuel  1994    bilateral    HEMORRHOID SURGERY  1970    KELOID EXCISION  1974, 1976    abdominal wall    TOTAL ABDOMINAL HYSTERECTOMY  1972    TRIGGER FINGER RELEASE  2003    left hand       Social History:  Social History     Socioeconomic History    Marital status:     Number of children: 2   Occupational History    Occupation:    Tobacco Use    Smoking status: Never    Smokeless tobacco: Never   Substance and Sexual Activity    Alcohol use: No    Drug use: Never     Social Drivers of Health     Financial Resource Strain: Low Risk "  (3/20/2024)    Overall Financial Resource Strain (CARDIA)     Difficulty of Paying Living Expenses: Not hard at all   Food Insecurity: No Food Insecurity (3/20/2024)    Hunger Vital Sign     Worried About Running Out of Food in the Last Year: Never true     Ran Out of Food in the Last Year: Never true   Transportation Needs: No Transportation Needs (3/20/2024)    PRAPARE - Transportation     Lack of Transportation (Medical): No     Lack of Transportation (Non-Medical): No   Physical Activity: Unknown (3/20/2024)    Exercise Vital Sign     Days of Exercise per Week: 3 days   Stress: No Stress Concern Present (3/20/2024)    [employer] - Occupational Stress Questionnaire     Feeling of Stress : Only a little   Housing Stability: Low Risk  (3/20/2024)    Housing Stability Vital Sign     Unable to Pay for Housing in the Last Year: No     Number of Places Lived in the Last Year: 1     Unstable Housing in the Last Year: No       Family History:  Family History   Adopted: Yes   Problem Relation Name Age of Onset    Heart failure Mother      Alzheimer's disease Mother      Other Sister Jocelyne          from too much anesthesia    Arthritis Sister Sally         back problems    Hypertension Sister Sally     Transient ischemic attack Sister Sally     Congenital heart disease Brother Mark     Stroke Brother Vieyra     Coronary artery disease Brother Bonifacio         s/p stenting    Stroke Brother Bonifacio     Breast cancer Maternal Grandmother      Other Son          had eosinophilic granulomatosis -  shortly after lung transplant    Colon cancer Other grandson - Dmitriy     Other Daughter          stiffman syndrome    Diabetes Neg Hx         Allergies and Medications: (updated and reviewed)  Review of patient's allergies indicates:   Allergen Reactions    Cholesterol analogues Other (See Comments)     Muscle spasam    Clindamycin Hives    Statins-hmg-coa reductase inhibitors Other (See  Comments)    Sulfa (sulfonamide antibiotics)     Tramadol Nausea And Vomiting    Welchol [colesevelam] Other (See Comments)    Codeine Rash    Penicillins Rash     Current Outpatient Medications   Medication Sig Dispense Refill    colestipoL (COLESTID) 1 gram Tab Take 2 tablets (2 g total) by mouth 2 (two) times daily. 360 tablet 1    cyanocobalamin (VITAMIN B-12) 1000 MCG tablet Take 100 mcg by mouth once daily.      diclofenac sodium (VOLTAREN) 1 % Gel Apply 2 g topically 3 (three) times daily. for 10 days 100 g 0    fluticasone (VERAMYST) 27.5 mcg/actuation nasal spray 2 sprays by Nasal route once daily.      hydroCHLOROthiazide (HYDRODIURIL) 25 MG tablet Take 1 tablet (25 mg total) by mouth once daily. 90 tablet 1    lisinopriL (PRINIVIL,ZESTRIL) 40 MG tablet Take 1 tablet (40 mg total) by mouth once daily. 90 tablet 1    MULTIVITAMIN/IRON/FOLIC ACID (CENTRUM COMPLETE ORAL) Take 1 tablet by mouth once daily.      omega-3 fatty acids 1,000 mg Cap Take by mouth. 1 Capsule Oral Every day      vitamin D 185 MG Tab Take 185 mg by mouth once daily.      allopurinoL (ZYLOPRIM) 100 MG tablet Take 0.5 tablets (50 mg total) by mouth every 48 hours. 30 tablet 0    CALCIUM ORAL Take 1 tablet by mouth once daily.      fluticasone propionate (FLONASE) 50 mcg/actuation nasal spray        No current facility-administered medications for this visit.       Patient Care Team:  Nusrat Cruz MD as PCP - General (Internal Medicine)  Cindy Oscar LPN as Care Coordinator  Shira Barksdale OD (Ophthalmology)         - The patient is given an After Visit Summary that lists all medications with directions, allergies, education, orders placed during this encounter and follow-up instructions.      - I have reviewed the patient's medical information including past medical, family, and social history sections including the medications and allergies.      - We discussed the patient's current medications.     This note was  created by combination of typed  and MModal dictation.  Transcription errors may be present.  If there are any questions, please contact me.       Luna Barber NP

## 2024-10-29 DIAGNOSIS — Z00.00 ENCOUNTER FOR MEDICARE ANNUAL WELLNESS EXAM: ICD-10-CM

## 2024-10-31 ENCOUNTER — TELEPHONE (OUTPATIENT)
Dept: FAMILY MEDICINE | Facility: CLINIC | Age: 84
End: 2024-10-31
Payer: MEDICARE

## 2024-11-01 ENCOUNTER — PATIENT OUTREACH (OUTPATIENT)
Dept: ADMINISTRATIVE | Facility: HOSPITAL | Age: 84
End: 2024-11-01
Payer: MEDICARE

## 2024-11-18 ENCOUNTER — LAB VISIT (OUTPATIENT)
Dept: LAB | Facility: HOSPITAL | Age: 84
End: 2024-11-18
Attending: INTERNAL MEDICINE
Payer: MEDICARE

## 2024-11-18 ENCOUNTER — OFFICE VISIT (OUTPATIENT)
Dept: FAMILY MEDICINE | Facility: CLINIC | Age: 84
End: 2024-11-18
Payer: MEDICARE

## 2024-11-18 VITALS
HEART RATE: 63 BPM | BODY MASS INDEX: 24.79 KG/M2 | OXYGEN SATURATION: 93 % | SYSTOLIC BLOOD PRESSURE: 128 MMHG | TEMPERATURE: 98 F | DIASTOLIC BLOOD PRESSURE: 70 MMHG | WEIGHT: 139.88 LBS | HEIGHT: 63 IN

## 2024-11-18 DIAGNOSIS — T46.6X5A STATIN MYOPATHY: ICD-10-CM

## 2024-11-18 DIAGNOSIS — M85.80 OSTEOPENIA AFTER MENOPAUSE: ICD-10-CM

## 2024-11-18 DIAGNOSIS — D56.3 ALPHA THALASSEMIA TRAIT: ICD-10-CM

## 2024-11-18 DIAGNOSIS — E11.311 TYPE 2 DIABETES MELLITUS WITH RETINOPATHY AND MACULAR EDEMA, WITHOUT LONG-TERM CURRENT USE OF INSULIN, UNSPECIFIED LATERALITY, UNSPECIFIED RETINOPATHY SEVERITY: ICD-10-CM

## 2024-11-18 DIAGNOSIS — E78.5 HYPERLIPIDEMIA LDL GOAL <100: ICD-10-CM

## 2024-11-18 DIAGNOSIS — Z78.0 OSTEOPENIA AFTER MENOPAUSE: ICD-10-CM

## 2024-11-18 DIAGNOSIS — N25.81 SECONDARY HYPERPARATHYROIDISM OF RENAL ORIGIN: ICD-10-CM

## 2024-11-18 DIAGNOSIS — G72.0 STATIN MYOPATHY: ICD-10-CM

## 2024-11-18 DIAGNOSIS — R30.0 DYSURIA: ICD-10-CM

## 2024-11-18 DIAGNOSIS — Z23 FLU VACCINE NEED: ICD-10-CM

## 2024-11-18 DIAGNOSIS — E11.9 CONTROLLED TYPE 2 DIABETES MELLITUS WITHOUT COMPLICATION, WITHOUT LONG-TERM CURRENT USE OF INSULIN: ICD-10-CM

## 2024-11-18 DIAGNOSIS — I12.9 BENIGN HYPERTENSION WITH CHRONIC KIDNEY DISEASE, STAGE IV: ICD-10-CM

## 2024-11-18 DIAGNOSIS — I70.0 ATHEROSCLEROSIS OF ABDOMINAL AORTA: ICD-10-CM

## 2024-11-18 DIAGNOSIS — Z00.00 ROUTINE MEDICAL EXAM: Primary | ICD-10-CM

## 2024-11-18 DIAGNOSIS — M79.674 PAIN OF TOE OF RIGHT FOOT: ICD-10-CM

## 2024-11-18 DIAGNOSIS — Z23 NEED FOR SHINGLES VACCINE: ICD-10-CM

## 2024-11-18 DIAGNOSIS — N18.4 BENIGN HYPERTENSION WITH CHRONIC KIDNEY DISEASE, STAGE IV: ICD-10-CM

## 2024-11-18 LAB
BILIRUB SERPL-MCNC: NEGATIVE MG/DL
BLOOD URINE, POC: NORMAL
COLOR, POC UA: YELLOW
ESTIMATED AVG GLUCOSE: 123 MG/DL (ref 68–131)
GLUCOSE UR QL STRIP: NORMAL
HBA1C MFR BLD: 5.9 % (ref 4–5.6)
KETONES UR QL STRIP: NEGATIVE
LEUKOCYTE ESTERASE URINE, POC: NORMAL
NITRITE, POC UA: NEGATIVE
PH, POC UA: 5
PROTEIN, POC: NORMAL
SPECIFIC GRAVITY, POC UA: 1.01
URATE SERPL-MCNC: 8.7 MG/DL (ref 2.4–5.7)
UROBILINOGEN, POC UA: NORMAL

## 2024-11-18 PROCEDURE — 36415 COLL VENOUS BLD VENIPUNCTURE: CPT | Mod: PO | Performed by: INTERNAL MEDICINE

## 2024-11-18 PROCEDURE — 99214 OFFICE O/P EST MOD 30 MIN: CPT | Mod: PBBFAC,PO | Performed by: INTERNAL MEDICINE

## 2024-11-18 PROCEDURE — 90653 IIV ADJUVANT VACCINE IM: CPT | Mod: PBBFAC,PO

## 2024-11-18 PROCEDURE — 81001 URINALYSIS AUTO W/SCOPE: CPT | Mod: PBBFAC,PO | Performed by: INTERNAL MEDICINE

## 2024-11-18 PROCEDURE — 99999PBSHW POCT URINALYSIS, DIPSTICK OR TABLET REAGENT, AUTOMATED, WITH MICROSCOP: Mod: PBBFAC,,,

## 2024-11-18 PROCEDURE — 87086 URINE CULTURE/COLONY COUNT: CPT | Performed by: INTERNAL MEDICINE

## 2024-11-18 PROCEDURE — 99999 PR PBB SHADOW E&M-EST. PATIENT-LVL IV: CPT | Mod: PBBFAC,,, | Performed by: INTERNAL MEDICINE

## 2024-11-18 PROCEDURE — 99999PBSHW PR PBB SHADOW TECHNICAL ONLY FILED TO HB: Mod: PBBFAC,,,

## 2024-11-18 PROCEDURE — G0008 ADMIN INFLUENZA VIRUS VAC: HCPCS | Mod: PBBFAC,PO

## 2024-11-18 PROCEDURE — 83036 HEMOGLOBIN GLYCOSYLATED A1C: CPT | Performed by: INTERNAL MEDICINE

## 2024-11-18 PROCEDURE — 84550 ASSAY OF BLOOD/URIC ACID: CPT | Performed by: INTERNAL MEDICINE

## 2024-11-18 RX ADMIN — INFLUENZA A VIRUS A/VICTORIA/4897/2022 IVR-238 (H1N1) ANTIGEN (FORMALDEHYDE INACTIVATED), INFLUENZA A VIRUS A/THAILAND/8/2022 IVR-237 (H3N2) ANTIGEN (FORMALDEHYDE INACTIVATED), INFLUENZA B VIRUS B/AUSTRIA/1359417/2021 BVR-26 ANTIGEN (FORMALDEHYDE INACTIVATED) 0.5 ML: 15; 15; 15 INJECTION, SUSPENSION INTRAMUSCULAR at 03:11

## 2024-11-18 NOTE — PROGRESS NOTES
CHIEF COMPLAINT:   Chief Complaint   Patient presents with    Urinary Tract Infection     Pain while urinating - yesterday    Follow-up    Hematuria     Yesterday,none today          HISTORY OF PRESENT ILLNESS:  Lizbeth Hutchison is a 84 y.o. female who presents to the clinic today for a routine physical exam. Her last physical exam was approximately 1 years(s) ago.          Patient reports a gout flare-up that began on September 16th with pain in the right great toe, which was warm to touch, and ankle swelling with noticeable edema. She sought treatment at the hospital in [place], where an X-ray was performed. Given her kidney issues, she received only two Tylenols and was discharged.  By the following Monday, the pain intensified, causing foot and toe swelling with severe pain on the right side of her foot and visible vein distention. Her niece took her to urgent care on [address], where she received a partial steroid injection. The pain eased from Tuesday through Thursday but exacerbated by Friday, with ankle swelling and the right great toe becoming warm and painful again.  On Saturday, she returned to urgent care and received another partial steroid injection. Following advice from a nurse friend, she began icing her foot and consuming a homemade herbal tea (black pepper, turmeric, cinnamon, and powdered shoshana) every morning for 2 weeks, which seemed to alleviate the pain and swelling. She was prescribed allopurinol but has not taken it due to concerns about interactions with her current condition/low kidney function.    Patient reports a possible UTI. Yesterday at 2:45 AM, she felt pain in her lower abdomen. When standing, she had urinary incontinence and hematuria. She self-treated by drinking cranberry juice throughout the day, which seemed to alleviate the symptoms. By 7 PM the same day, the hematuria had resolved, and the pain had significantly decreased.    Patient mentions having fecal  incontinence and wearing protective underwear at night. On the Friday before the UTI symptoms, there was a small amount of fecal matter in her protective underwear.    Patient denies current fever or any UTI symptoms today.          Subjective    PAST MEDICAL HISTORY:  Past Medical History:   Diagnosis Date    Alpha thalassemia trait     Anxiety     Atherosclerosis of abdominal aorta     noted on CT scan 4/6/2016    DDD (degenerative disc disease), lumbar     chronic low back pain    Diverticulosis     History of colonic polyps     last colonoscopy 2011 - normal    Hyperlipidemia LDL goal <100     unable to tolerate statins or Welchol    Hypertension     Osteopenia     no need for medication at this time - last BMD October 2010    Overweight     Type II or unspecified type diabetes mellitus without mention of complication, not stated as uncontrolled     Vitamin D deficiency disease     resolved with supplementation       PAST SURGICAL HISTORY:  Past Surgical History:   Procedure Laterality Date    CATARACT EXTRACTION Bilateral 6/25/19 8/23/19    samuel  1994    bilateral    HEMORRHOID SURGERY  1970    KELOID EXCISION  1974, 1976    abdominal wall    TOTAL ABDOMINAL HYSTERECTOMY  1972    TRIGGER FINGER RELEASE  2003    left hand       SOCIAL HISTORY:  Social History     Socioeconomic History    Marital status:     Number of children: 2   Occupational History    Occupation:    Tobacco Use    Smoking status: Never    Smokeless tobacco: Never   Substance and Sexual Activity    Alcohol use: No    Drug use: Never     Social Drivers of Health     Financial Resource Strain: Low Risk  (3/20/2024)    Overall Financial Resource Strain (CARDIA)     Difficulty of Paying Living Expenses: Not hard at all   Food Insecurity: No Food Insecurity (3/20/2024)    Hunger Vital Sign     Worried About Running Out of Food in the Last Year: Never true     Ran Out of Food in the Last Year: Never true    Transportation Needs: No Transportation Needs (3/20/2024)    PRAPARE - Transportation     Lack of Transportation (Medical): No     Lack of Transportation (Non-Medical): No   Physical Activity: Unknown (3/20/2024)    Exercise Vital Sign     Days of Exercise per Week: 3 days   Stress: No Stress Concern Present (3/20/2024)    [employer] - Occupational Stress Questionnaire     Feeling of Stress : Only a little   Housing Stability: Low Risk  (3/20/2024)    Housing Stability Vital Sign     Unable to Pay for Housing in the Last Year: No     Number of Places Lived in the Last Year: 1     Unstable Housing in the Last Year: No       FAMILY HISTORY:  Family History   Adopted: Yes   Problem Relation Name Age of Onset    Heart failure Mother      Alzheimer's disease Mother      Other Sister Jocelyne          from too much anesthesia    Arthritis Sister Sally         back problems    Hypertension Sister Sally     Transient ischemic attack Sister Sally     Congenital heart disease Brother Mark     Stroke Brother Vieyra     Coronary artery disease Brother Bonifacio         s/p stenting    Stroke Brother Bonifacio     Breast cancer Maternal Grandmother      Other Son          had eosinophilic granulomatosis -  shortly after lung transplant    Colon cancer Other grandson - Dmitriy     Other Daughter          stiffman syndrome    Diabetes Neg Hx         ALLERGIES AND MEDICATIONS: updated and reviewed.  Review of patient's allergies indicates:   Allergen Reactions    Cholesterol analogues Other (See Comments)     Muscle spasam    Clindamycin Hives    Statins-hmg-coa reductase inhibitors Other (See Comments)    Sulfa (sulfonamide antibiotics)     Tramadol Nausea And Vomiting    Welchol [colesevelam] Other (See Comments)    Codeine Rash    Penicillins Rash     Medication List with Changes/Refills   Current Medications    ALLOPURINOL (ZYLOPRIM) 100 MG TABLET    Take 0.5 tablets (50 mg total) by mouth every  48 hours.    CALCIUM ORAL    Take 1 tablet by mouth once daily.    COLESTIPOL (COLESTID) 1 GRAM TAB    Take 2 tablets (2 g total) by mouth 2 (two) times daily.    CYANOCOBALAMIN (VITAMIN B-12) 1000 MCG TABLET    Take 100 mcg by mouth once daily.    DICLOFENAC SODIUM (VOLTAREN) 1 % GEL    Apply 2 g topically 3 (three) times daily. for 10 days    FLUTICASONE (VERAMYST) 27.5 MCG/ACTUATION NASAL SPRAY    2 sprays by Nasal route once daily.    FLUTICASONE PROPIONATE (FLONASE) 50 MCG/ACTUATION NASAL SPRAY        HYDROCHLOROTHIAZIDE (HYDRODIURIL) 25 MG TABLET    Take 1 tablet (25 mg total) by mouth once daily.    LISINOPRIL (PRINIVIL,ZESTRIL) 40 MG TABLET    Take 1 tablet (40 mg total) by mouth once daily.    MULTIVITAMIN/IRON/FOLIC ACID (CENTRUM COMPLETE ORAL)    Take 1 tablet by mouth once daily.    OMEGA-3 FATTY ACIDS 1,000 MG CAP    Take by mouth. 1 Capsule Oral Every day    VITAMIN D 185 MG TAB    Take 185 mg by mouth once daily.          CARE TEAM:  Patient Care Team:  Nusrat Cruz MD as PCP - General (Internal Medicine)  Shira Barksdale OD (Ophthalmology)       SCREENING HISTORY:  Health Maintenance         Date Due Completion Date    TETANUS VACCINE Never done ---    Shingles Vaccine (1 of 2) Never done ---    RSV Vaccine (Age 60+ and Pregnant patients) (1 - 1-dose 75+ series) Never done ---    DEXA Scan 01/19/2023 1/19/2021    Override on 10/11/2011: Done (osteopenia)    Hemoglobin A1c 08/16/2024 2/16/2024    Influenza Vaccine (1) 09/01/2024 2/12/2024    COVID-19 Vaccine (7 - 2024-25 season) 09/01/2024 10/2/2023    Diabetes Urine Screening 02/16/2025 2/16/2024    Lipid Panel 02/16/2025 2/16/2024    Eye Exam 10/10/2025 10/10/2024    Override on 3/18/2015: Done    Override on 3/4/2014: Done    Override on 1/28/2013: Done              REVIEW OF SYSTEMS:   The patient reports : good dietary habits.  The patient reports  : that they exercise regularly.  Review of Systems   Constitutional:  Negative for chills  "and fever.   Respiratory:  Negative for cough and shortness of breath.    Cardiovascular:  Negative for chest pain and claudication.   Gastrointestinal:  Negative for abdominal pain.   Genitourinary:  Positive for dysuria and hematuria.   Musculoskeletal:  Positive for arthralgias.       ROS (Optional)-: no pelvic pain  Breast ROS (Optional)-: negative for breast lumps/discharge          Objective    PHYSICAL EXAMINATION/VITALS:  Vitals:    11/18/24 1410   BP: 128/70   BP Location: Left arm   Patient Position: Sitting   Pulse: 63   Temp: 97.6 °F (36.4 °C)   TempSrc: Oral   SpO2: (!) 93%   Weight: 63.5 kg (139 lb 14.1 oz)   Height: 5' 3" (1.6 m)        Body mass index is 24.78 kg/m².      General appearance - alert, well appearing, and in no distress, normal appearing weight  Psychiatric - alert, oriented to person, place, and time, normal behavior, speech, dress, motor activity and thought process  Eyes - pupils equal and reactive, extraocular eye movements intact, sclera anicteric  Neck - supple, no significant adenopathy, carotids upstroke normal bilaterally, no bruits  Lymphatics - no palpable cervical lymphadenopathy  Chest - clear to auscultation, no wheezes, rales or rhonchi, symmetric air entry  Heart - normal rate and regular rhythm  Neurological - alert, normal speech, no focal findings; cranial nerves II through XII intact  Musculoskeletal - not examined  Extremities - no pedal edema noted  Skin - normal coloration, no suspicious skin lesions      LABS:  Results for orders placed or performed in visit on 11/18/24   POCT urinalysis, dipstick or tablet reag    Collection Time: 11/18/24  2:33 PM   Result Value Ref Range    Color, UA Yellow     Spec Grav UA 1.015     pH, UA 5     WBC, UA trace     Nitrite, UA negative     Protein, POC trace     Glucose, UA normal     Ketones, UA negative     Urobilinogen, UA normal     Bilirubin, POC negative     Blood, UA trace                 ASSESSMENT AND PLAN:   1. " Routine medical exam  Counseled on age appropriate medical preventative services including age appropriate cancer screenings, age appropriate eye and dental exams, over all nutritional health, need for a consistent exercise regimen, and an over all push towards maintaining a vigorous and active lifestyle.  Counseled on age appropriate vaccines and discussed upcoming health care needs based on age/gender. Discussed good sleep hygiene and stress management.    2. 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 Overall diabetes control has stayed the same since last check.  We discussed diabetic diet and regular exercise.   We discussed home blood sugar monitoring, if appropriate - the patient does not need to test daily but can test only as needed.   We discussed low sugar/low carbohydrate diet and regular exercise to prevent progression. No need for prescription medication at this time.  Diabetic complications addressed: None addressed today.  Patient was counseled on the need for yearly eye exam to screen for/monitor diabetic retinopathy and yearly diabetic foot exam.  Overview:  Diet controlled      3. Benign hypertension with chronic kidney disease, stage IV  BP Readings from Last 1 Encounters:   11/18/24 128/70      Discussed sodium restriction, maintaining ideal body weight and regular exercise program as physiologic means to achieve blood pressure control. The patient will strive towards this.   The current medical regimen is effective;  continue present plan and medications. Recommended patient to check home readings to monitor and see me for followup as scheduled or sooner as needed.   Patient was educated that both decongestant and anti-inflammatory  medication may raise blood pressure.  Stable decreased kidney function. Observe. Patient counseled to avoid/minimize the use of anti-inflammatory  Medication. Discussed to stay well hydrated. Also discussed with patient that good control of blood pressure and/or diabetes, if present, will help to prevent progression.  The patient is not active on the digital hypertension program.    4. Hyperlipidemia LDL goal <100/5. Statin myopathy  Lab Results   Component Value Date    CHOL 250 (H) 02/16/2024     Lab Results   Component Value Date    HDL 44 02/16/2024     Lab Results   Component Value Date    LDLCALC 171.6 (H) 02/16/2024     Lab Results   Component Value Date    TRIG 172 (H) 02/16/2024     Lab Results   Component Value Date    LDLCALC 171.6 (H) 02/16/2024     We discussed low fat diet and regular exercise. Patient is statin intolerant.  Overview:  unable to tolerate statins or Welchol        6. Atherosclerosis of abdominal aorta  Patient with Atherosclerosis of the Aorta.  Stable/asymptomatic. Currently stable on lipid and blood pressure monitoring. Statin intolerant.  Overview:  noted on CT scan 4/6/2016      7. Alpha thalassemia trait  Stable. Asymptomatic. Observe.    8. Secondary hyperparathyroidism of renal origin  Stable. Asymptomatic. Observe.    9. Osteopenia after menopause  We discussed adequate calcium intake (preferably from diet) and vitamin D supplementation. We discussed fall precautions. She is planning to call back to schedule her BMD. No need for prescription medication at this time. Further treatment plan will be based on results of bone density testing.  Overview:  no need for medication at this time - last BMD March 2016  BMD 1/2021 - No need for Rx tx at this time.       10. Flu vaccine need    -     influenza (adjuvanted) (Fluad) 45 mcg/0.5 mL IM vaccine (> or = 66 yo) 0.5 mL    11. Need for shingles vaccine  Patient was advised to get immunization at the pharmacy.    12. Dysuria  She  reports her symptoms have resolved.  Dipstick does still show some mild blood.  I will send urine for culture and address results accordingly.  She will continue cranberry juice and pills for now.  -     POCT urinalysis, dipstick or tablet reag  -     Urine culture    13. Pain of toe of right foot  I suspect she has gout, but I do not have any uric acid lab results.  I will check that at this time.  Unfortunately, her decreased kidney function prevents her from taking many medications.  I discussed with the patient that steroids are allowed when she has a flare-up.  She has received recent information on further dietary restrictions to hopefully prevent future flare-ups.  She will also discuss further with Nephrology when she establishes with them in the near future.  -     Uric Acid; Future; Expected date: 11/18/2024               PATIENT EDUCATION:  Explained the mechanism of gout: increased uric acid in blood forming crystals in joints  Discussed how allopurinol works to decrease uric acid levels and potential initial exacerbation of gout attacks in the 1st year of treatment  Informed patient about the close proximity of bladder and rectal openings, increasing risk of UTI with fecal incontinence  Clarified that A1C test is a 3-month average of blood sugar and is not affected by recent food intake    ACTION ITEMS/LIFESTYLE:  Patient to continue consuming cranberries for potential UTI symptoms  Recommend switching to OTC cranberry pills to reduce sugar intake  Patient to avoid foods that may trigger gout: red meat, seafood, dry beans, mushrooms, spinach, liver, broccoli, cauliflower, will  Recommend increasing water intake          Orders Placed This Encounter   Procedures    Urine culture    Uric Acid    POCT urinalysis, dipstick or tablet reag      FOLLOW UP: Follow up in about 6 months (around 5/18/2025), or if symptoms worsen or fail to improve, for follow up chronic medical conditions.. or sooner as  needed.    This note was generated with the assistance of ambient listening technology. Verbal consent was obtained by the patient and accompanying visitor(s) for the recording of patient appointment to facilitate this note. I attest to having reviewed and edited the generated note for accuracy, though some syntax or spelling errors may persist. Please contact the author of this note for any clarification.

## 2024-11-19 LAB — BACTERIA UR CULT: NORMAL

## 2024-11-20 ENCOUNTER — PATIENT MESSAGE (OUTPATIENT)
Dept: FAMILY MEDICINE | Facility: CLINIC | Age: 84
End: 2024-11-20
Payer: MEDICARE

## 2024-11-21 ENCOUNTER — PATIENT MESSAGE (OUTPATIENT)
Dept: ADMINISTRATIVE | Facility: CLINIC | Age: 84
End: 2024-11-21
Payer: MEDICARE

## 2024-11-26 DIAGNOSIS — N18.4 BENIGN HYPERTENSION WITH CHRONIC KIDNEY DISEASE, STAGE IV: Primary | ICD-10-CM

## 2024-11-26 DIAGNOSIS — I12.9 BENIGN HYPERTENSION WITH CHRONIC KIDNEY DISEASE, STAGE IV: Primary | ICD-10-CM

## 2024-11-27 DIAGNOSIS — M10.9 GOUT, UNSPECIFIED CAUSE, UNSPECIFIED CHRONICITY, UNSPECIFIED SITE: ICD-10-CM

## 2024-11-29 RX ORDER — ALLOPURINOL 100 MG/1
TABLET ORAL
Qty: 23 TABLET | Refills: 1 | Status: SHIPPED | OUTPATIENT
Start: 2024-11-29

## 2024-12-10 ENCOUNTER — LAB VISIT (OUTPATIENT)
Dept: LAB | Facility: HOSPITAL | Age: 84
End: 2024-12-10
Attending: STUDENT IN AN ORGANIZED HEALTH CARE EDUCATION/TRAINING PROGRAM
Payer: MEDICARE

## 2024-12-10 DIAGNOSIS — I12.9 BENIGN HYPERTENSION WITH CHRONIC KIDNEY DISEASE, STAGE IV: ICD-10-CM

## 2024-12-10 DIAGNOSIS — N18.4 BENIGN HYPERTENSION WITH CHRONIC KIDNEY DISEASE, STAGE IV: ICD-10-CM

## 2024-12-10 LAB
25(OH)D3+25(OH)D2 SERPL-MCNC: 36 NG/ML (ref 30–96)
ALBUMIN SERPL BCP-MCNC: 4.1 G/DL (ref 3.5–5.2)
ANION GAP SERPL CALC-SCNC: 11 MMOL/L (ref 8–16)
BASOPHILS # BLD AUTO: 0.05 K/UL (ref 0–0.2)
BASOPHILS NFR BLD: 0.8 % (ref 0–1.9)
BUN SERPL-MCNC: 22 MG/DL (ref 8–23)
CALCIUM SERPL-MCNC: 9.6 MG/DL (ref 8.7–10.5)
CHLORIDE SERPL-SCNC: 106 MMOL/L (ref 95–110)
CO2 SERPL-SCNC: 26 MMOL/L (ref 23–29)
CREAT SERPL-MCNC: 1.6 MG/DL (ref 0.5–1.4)
DIFFERENTIAL METHOD BLD: ABNORMAL
EOSINOPHIL # BLD AUTO: 0.2 K/UL (ref 0–0.5)
EOSINOPHIL NFR BLD: 2.5 % (ref 0–8)
ERYTHROCYTE [DISTWIDTH] IN BLOOD BY AUTOMATED COUNT: 18.5 % (ref 11.5–14.5)
EST. GFR  (NO RACE VARIABLE): 31.6 ML/MIN/1.73 M^2
FERRITIN SERPL-MCNC: 196 NG/ML (ref 20–300)
GLUCOSE SERPL-MCNC: 73 MG/DL (ref 70–110)
HCT VFR BLD AUTO: 47.9 % (ref 37–48.5)
HGB BLD-MCNC: 13.5 G/DL (ref 12–16)
IMM GRANULOCYTES # BLD AUTO: 0.02 K/UL (ref 0–0.04)
IMM GRANULOCYTES NFR BLD AUTO: 0.3 % (ref 0–0.5)
IRON SERPL-MCNC: 75 UG/DL (ref 30–160)
LYMPHOCYTES # BLD AUTO: 2.5 K/UL (ref 1–4.8)
LYMPHOCYTES NFR BLD: 40.2 % (ref 18–48)
MCH RBC QN AUTO: 22.5 PG (ref 27–31)
MCHC RBC AUTO-ENTMCNC: 28.2 G/DL (ref 32–36)
MCV RBC AUTO: 80 FL (ref 82–98)
MONOCYTES # BLD AUTO: 0.4 K/UL (ref 0.3–1)
MONOCYTES NFR BLD: 7.2 % (ref 4–15)
NEUTROPHILS # BLD AUTO: 3 K/UL (ref 1.8–7.7)
NEUTROPHILS NFR BLD: 49 % (ref 38–73)
NRBC BLD-RTO: 0 /100 WBC
PHOSPHATE SERPL-MCNC: 2.7 MG/DL (ref 2.7–4.5)
PLATELET # BLD AUTO: 195 K/UL (ref 150–450)
PMV BLD AUTO: 13.3 FL (ref 9.2–12.9)
POTASSIUM SERPL-SCNC: 4.1 MMOL/L (ref 3.5–5.1)
PTH-INTACT SERPL-MCNC: 132.8 PG/ML (ref 9–77)
RBC # BLD AUTO: 6.01 M/UL (ref 4–5.4)
SATURATED IRON: 25 % (ref 20–50)
SODIUM SERPL-SCNC: 143 MMOL/L (ref 136–145)
TOTAL IRON BINDING CAPACITY: 302 UG/DL (ref 250–450)
TRANSFERRIN SERPL-MCNC: 204 MG/DL (ref 200–375)
URATE SERPL-MCNC: 8.7 MG/DL (ref 2.4–5.7)
WBC # BLD AUTO: 6.09 K/UL (ref 3.9–12.7)

## 2024-12-10 PROCEDURE — 85025 COMPLETE CBC W/AUTO DIFF WBC: CPT | Performed by: STUDENT IN AN ORGANIZED HEALTH CARE EDUCATION/TRAINING PROGRAM

## 2024-12-10 PROCEDURE — 36415 COLL VENOUS BLD VENIPUNCTURE: CPT | Mod: PO | Performed by: STUDENT IN AN ORGANIZED HEALTH CARE EDUCATION/TRAINING PROGRAM

## 2024-12-10 PROCEDURE — 82306 VITAMIN D 25 HYDROXY: CPT | Performed by: STUDENT IN AN ORGANIZED HEALTH CARE EDUCATION/TRAINING PROGRAM

## 2024-12-10 PROCEDURE — 82728 ASSAY OF FERRITIN: CPT | Performed by: STUDENT IN AN ORGANIZED HEALTH CARE EDUCATION/TRAINING PROGRAM

## 2024-12-10 PROCEDURE — 83970 ASSAY OF PARATHORMONE: CPT | Performed by: STUDENT IN AN ORGANIZED HEALTH CARE EDUCATION/TRAINING PROGRAM

## 2024-12-10 PROCEDURE — 84550 ASSAY OF BLOOD/URIC ACID: CPT | Performed by: STUDENT IN AN ORGANIZED HEALTH CARE EDUCATION/TRAINING PROGRAM

## 2024-12-10 PROCEDURE — 80069 RENAL FUNCTION PANEL: CPT | Performed by: STUDENT IN AN ORGANIZED HEALTH CARE EDUCATION/TRAINING PROGRAM

## 2024-12-10 PROCEDURE — 84466 ASSAY OF TRANSFERRIN: CPT | Performed by: STUDENT IN AN ORGANIZED HEALTH CARE EDUCATION/TRAINING PROGRAM

## 2024-12-12 DIAGNOSIS — E78.5 HYPERLIPIDEMIA LDL GOAL <100: ICD-10-CM

## 2024-12-12 NOTE — TELEPHONE ENCOUNTER
Care Due:                  Date            Visit Type   Department     Provider  --------------------------------------------------------------------------------                                [facility]                              PRIMARY      MED/ INTERNAL  Laurentia Kaykay  Last Visit: 11-      CARE ([facility])   MED/ PEDS      Austin Cruz                              [facility]                              PRIMARY      MED/ INTERNAL  Laurentia Kaykay  Next Visit: 07-      CARE ([facility])   MED/ PEDS      Leslieer  Anthony                                                            Last  Test          Frequency    Reason                     Performed    Due Date  --------------------------------------------------------------------------------    CMP.........  12 months..  allopurinoL, colestipoL..  02-   02-    Lipid Panel.  12 months..  colestipoL...............  02-   02-    [employer] Embedded Care Due Messages. Reference number: [id number].   12/12/2024 11:50:11 AM CST

## 2024-12-13 RX ORDER — COLESTIPOL HYDROCHLORIDE 1 G/1
2 TABLET ORAL 2 TIMES DAILY
Qty: 360 TABLET | Refills: 0 | Status: SHIPPED | OUTPATIENT
Start: 2024-12-13

## 2024-12-13 NOTE — TELEPHONE ENCOUNTER
Provider Staff:  Action required for this patient    Requires labs      Please see care gap opportunities below in Care Due Message.    Thanks!  [facility]     Appointments      Date Provider   Last Visit   11/18/2024 Nusrat Cruz MD   Next Visit   Visit date not found Nusrat Cruz MD     Refill Decision Note   Lizbeth Foster  is requesting a refill authorization.  Brief Assessment and Rationale for Refill:  Approve     Medication Therapy Plan:         Comments:     Note composed:9:29 AM 12/13/2024

## 2024-12-16 ENCOUNTER — OFFICE VISIT (OUTPATIENT)
Dept: NEPHROLOGY | Facility: CLINIC | Age: 84
End: 2024-12-16
Payer: MEDICARE

## 2024-12-16 VITALS
OXYGEN SATURATION: 96 % | RESPIRATION RATE: 18 BRPM | BODY MASS INDEX: 24.92 KG/M2 | DIASTOLIC BLOOD PRESSURE: 64 MMHG | SYSTOLIC BLOOD PRESSURE: 122 MMHG | HEART RATE: 69 BPM | WEIGHT: 140.63 LBS | HEIGHT: 63 IN

## 2024-12-16 DIAGNOSIS — N18.4 BENIGN HYPERTENSION WITH CHRONIC KIDNEY DISEASE, STAGE IV: ICD-10-CM

## 2024-12-16 DIAGNOSIS — I12.9 BENIGN HYPERTENSION WITH CHRONIC KIDNEY DISEASE, STAGE IV: ICD-10-CM

## 2024-12-16 DIAGNOSIS — N18.32 STAGE 3B CHRONIC KIDNEY DISEASE: Primary | ICD-10-CM

## 2024-12-16 DIAGNOSIS — M10.9 GOUT, UNSPECIFIED CAUSE, UNSPECIFIED CHRONICITY, UNSPECIFIED SITE: ICD-10-CM

## 2024-12-16 PROCEDURE — 99999 PR PBB SHADOW E&M-EST. PATIENT-LVL V: CPT | Mod: PBBFAC,,, | Performed by: STUDENT IN AN ORGANIZED HEALTH CARE EDUCATION/TRAINING PROGRAM

## 2024-12-16 PROCEDURE — 99215 OFFICE O/P EST HI 40 MIN: CPT | Mod: PBBFAC | Performed by: STUDENT IN AN ORGANIZED HEALTH CARE EDUCATION/TRAINING PROGRAM

## 2024-12-16 PROCEDURE — 99213 OFFICE O/P EST LOW 20 MIN: CPT | Mod: S$PBB,,, | Performed by: STUDENT IN AN ORGANIZED HEALTH CARE EDUCATION/TRAINING PROGRAM

## 2024-12-16 RX ORDER — ALLOPURINOL 100 MG/1
100 TABLET ORAL DAILY
Qty: 90 TABLET | Refills: 3 | Status: SHIPPED | OUTPATIENT
Start: 2024-12-16

## 2024-12-16 RX ORDER — PREDNISONE 10 MG/1
10 TABLET ORAL DAILY
Qty: 5 TABLET | Refills: 3 | Status: SHIPPED | OUTPATIENT
Start: 2024-12-16

## 2024-12-16 NOTE — PATIENT INSTRUCTIONS
You have chronic kidney disease, remember that this is a reflection that your kidneys have seen some use rather than a specific process. There are many uncontrollable factors that progress chronic kidney disease - mainly that we use our kidneys 24/7. We will do what we can to prolong the life of your kidney:    Stay plenty hydrated: unless otherwise directed aim for at minimum 2 liters a day  Low salt diet: goal is less than 2 grams of salt daily  Low animal protein diet: greater than 1.3 grams per kilograms bodyweight per day. (I.e if you weigh 100 kilograms (220 lbs) dont exceed greater than 130 grams of protein daily)  Avoid NSAIDS (nonsteroidal anti-inflammatories) like ibuprofen, motrin, goodies powder, advil. Tylenol is okay    Lastly, control of your other medical conditions, weight loss and exercise will always help as well.    We are starting something for your gout - start taking the allopurinol 100 mg daily, if you develop a rash please let me know.  If you develop a flare, take the prednisone for 5 days, if it continues please also let me know.

## 2024-12-16 NOTE — PROGRESS NOTES
Subjective     Chief Complaint: CKD     History of Present Illness:  Ms. Lizbeth Hutchison is a 84 y.o. female with active medical diagnosis of alpha thal trait, HTN, T2DM, gout    Reports that she had a gout flare 2 weeks ago, required 2 ED visits.     #CKD3b/4  Baseline creatinine 1.6-1.7  UA negative  UPCR 0    Creatinine   Date Value Ref Range Status   12/10/2024 1.6 (H) 0.5 - 1.4 mg/dL Final   02/16/2024 1.7 (H) 0.5 - 1.4 mg/dL Final   02/01/2023 1.7 (H) 0.5 - 1.4 mg/dL Final     eGFR   Date Value Ref Range Status   12/10/2024 31.6 (A) >60 mL/min/1.73 m^2 Final   02/16/2024 29.6 (A) >60 mL/min/1.73 m^2 Final   02/01/2023 29.8 (A) >60 mL/min/1.73 m^2 Final     Prot/Creat Ratio, Urine   Date Value Ref Range Status   12/10/2024 Unable to calculate 0.00 - 0.20 Final     #Secondary hyperparathyroidism  Calcium   Date Value Ref Range Status   12/10/2024 9.6 8.7 - 10.5 mg/dL Final     Phosphorus   Date Value Ref Range Status   12/10/2024 2.7 2.7 - 4.5 mg/dL Final     PTH, Intact   Date Value Ref Range Status   12/10/2024 132.8 (H) 9.0 - 77.0 pg/mL Final     #Anemia  Hemoglobin   Date Value Ref Range Status   12/10/2024 13.5 12.0 - 16.0 g/dL Final     Iron   Date Value Ref Range Status   12/10/2024 75 30 - 160 ug/dL Final     Transferrin   Date Value Ref Range Status   12/10/2024 204 200 - 375 mg/dL Final     TIBC   Date Value Ref Range Status   12/10/2024 302 250 - 450 ug/dL Final     Saturated Iron   Date Value Ref Range Status   12/10/2024 25 20 - 50 % Final     Ferritin   Date Value Ref Range Status   12/10/2024 196 20.0 - 300.0 ng/mL Final       #DM  Hemoglobin A1c: 5.9    #HTN  Home medications: hydrochlorothiaizde 25 qd, lisinopril 40 qd.  Usually 120s SBP at home.     #Gout  Uric acid 8.7, was on allopurinol 50 mg every other day.     Review of Systems   Constitutional:  Negative for chills and fever.   HENT:  Negative for ear discharge and ear pain.    Eyes:  Negative for blurred vision and double  vision.   Respiratory:  Negative for cough and shortness of breath.    Cardiovascular:  Negative for chest pain and palpitations.   Gastrointestinal:  Negative for abdominal pain, constipation, diarrhea, nausea and vomiting.   Genitourinary:  Negative for dysuria and frequency.   Musculoskeletal:  Negative for myalgias and neck pain.   Skin:  Negative for itching and rash.   Neurological:  Negative for tingling and headaches.             PAST HISTORY:     Past Medical History:   Diagnosis Date    Alpha thalassemia trait     Anxiety     Atherosclerosis of abdominal aorta     noted on CT scan 2016    DDD (degenerative disc disease), lumbar     chronic low back pain    Diverticulosis     History of colonic polyps     last colonoscopy  - normal    Hyperlipidemia LDL goal <100     unable to tolerate statins or Welchol    Hypertension     Osteopenia     no need for medication at this time - last BMD 2010    Overweight     Type II or unspecified type diabetes mellitus without mention of complication, not stated as uncontrolled     Vitamin D deficiency disease     resolved with supplementation       Past Surgical History:   Procedure Laterality Date    CATARACT EXTRACTION Bilateral 19    samuel      bilateral    HEMORRHOID SURGERY  1970    KELOID EXCISION  ,     abdominal wall    TOTAL ABDOMINAL HYSTERECTOMY      TRIGGER FINGER RELEASE      left hand       Family History   Adopted: Yes   Problem Relation Name Age of Onset    Heart failure Mother      Alzheimer's disease Mother      Other Sister Jocelyne          from too much anesthesia    Arthritis Sister Sally         back problems    Hypertension Sister Sally     Transient ischemic attack Sister Sally     Congenital heart disease Brother Mark     Stroke Brother Azalia     Coronary artery disease Brother Bonifacio         s/p stenting    Stroke Brother Bonifacio     Breast cancer Maternal Grandmother      Other Son           had eosinophilic granulomatosis -  shortly after lung transplant    Colon cancer Other grandson - Dmitriy     Other Daughter          stiffman syndrome    Diabetes Neg Hx         Social History     Socioeconomic History    Marital status:     Number of children: 2   Occupational History    Occupation:    Tobacco Use    Smoking status: Never    Smokeless tobacco: Never   Substance and Sexual Activity    Alcohol use: No    Drug use: Never     Social Drivers of Health     Financial Resource Strain: Low Risk  (3/20/2024)    Overall Financial Resource Strain (CARDIA)     Difficulty of Paying Living Expenses: Not hard at all   Food Insecurity: No Food Insecurity (3/20/2024)    Hunger Vital Sign     Worried About Running Out of Food in the Last Year: Never true     Ran Out of Food in the Last Year: Never true   Transportation Needs: No Transportation Needs (3/20/2024)    PRAPARE - Transportation     Lack of Transportation (Medical): No     Lack of Transportation (Non-Medical): No   Physical Activity: Unknown (3/20/2024)    Exercise Vital Sign     Days of Exercise per Week: 3 days   Stress: No Stress Concern Present (3/20/2024)    [employer] [employer] - Occupational Stress Questionnaire     Feeling of Stress : Only a little   Housing Stability: Low Risk  (3/20/2024)    Housing Stability Vital Sign     Unable to Pay for Housing in the Last Year: No     Number of Places Lived in the Last Year: 1     Unstable Housing in the Last Year: No       MEDICATIONS & ALLERGIES:     Current Outpatient Medications on File Prior to Visit   Medication Sig    allopurinoL (ZYLOPRIM) 100 MG tablet TAKE 1/2 TABLET BY MOUTH ONCE EVERY 48 HOURS    CALCIUM ORAL Take 1 tablet by mouth once daily.    colestipoL (COLESTID) 1 gram Tab Take 2 tablets (2 g total) by mouth 2 (two) times daily.    cyanocobalamin (VITAMIN B-12) 1000 MCG tablet Take 100 mcg by mouth once daily.    diclofenac sodium  (VOLTAREN) 1 % Gel Apply 2 g topically 3 (three) times daily. for 10 days    fluticasone (VERAMYST) 27.5 mcg/actuation nasal spray 2 sprays by Nasal route once daily.    fluticasone propionate (FLONASE) 50 mcg/actuation nasal spray     hydroCHLOROthiazide (HYDRODIURIL) 25 MG tablet Take 1 tablet (25 mg total) by mouth once daily.    lisinopriL (PRINIVIL,ZESTRIL) 40 MG tablet Take 1 tablet (40 mg total) by mouth once daily.    MULTIVITAMIN/IRON/FOLIC ACID (CENTRUM COMPLETE ORAL) Take 1 tablet by mouth once daily.    omega-3 fatty acids 1,000 mg Cap Take by mouth. 1 Capsule Oral Every day    vitamin D 185 MG Tab Take 185 mg by mouth once daily.     No current facility-administered medications on file prior to visit.       Review of patient's allergies indicates:   Allergen Reactions    Cholesterol analogues Other (See Comments)     Muscle spasam    Clindamycin Hives    Statins-hmg-coa reductase inhibitors Other (See Comments)    Sulfa (sulfonamide antibiotics)     Tramadol Nausea And Vomiting    Welchol [colesevelam] Other (See Comments)    Codeine Rash    Penicillins Rash       OBJECTIVE:     Vital Signs:  There were no vitals filed for this visit.    There is no height or weight on file to calculate BMI.     Physical Exam  Constitutional:       General: She is not in acute distress.     Appearance: Normal appearance. She is not ill-appearing or diaphoretic.   HENT:      Head: Normocephalic and atraumatic.      Mouth/Throat:      Pharynx: No oropharyngeal exudate or posterior oropharyngeal erythema.   Eyes:      General: No scleral icterus.        Right eye: No discharge.         Left eye: No discharge.      Extraocular Movements: Extraocular movements intact.      Conjunctiva/sclera: Conjunctivae normal.      Pupils: Pupils are equal, round, and reactive to light.   Neck:      Vascular: No JVD.      Trachea: No tracheal deviation.   Cardiovascular:      Rate and Rhythm: Normal rate and regular rhythm.      Heart  sounds: No murmur heard.  Pulmonary:      Effort: Pulmonary effort is normal. No respiratory distress.      Breath sounds: Normal breath sounds. No wheezing or rales.   Abdominal:      General: Abdomen is flat. Bowel sounds are normal. There is no distension.      Palpations: Abdomen is soft. There is no mass.      Tenderness: There is no abdominal tenderness.   Musculoskeletal:         General: No swelling, tenderness or deformity. Normal range of motion.      Cervical back: Normal range of motion and neck supple.      Right lower leg: No edema.      Left lower leg: No edema.   Lymphadenopathy:      Cervical: No cervical adenopathy.   Skin:     General: Skin is warm and dry.      Coloration: Skin is not jaundiced or pale.      Findings: No bruising, erythema or rash.   Neurological:      General: No focal deficit present.      Mental Status: She is alert and oriented to person, place, and time. Mental status is at baseline.      Cranial Nerves: No cranial nerve deficit.         Laboratory  Sodium   Date Value Ref Range Status   12/10/2024 143 136 - 145 mmol/L Final   02/16/2024 142 136 - 145 mmol/L Final   02/01/2023 145 136 - 145 mmol/L Final     Potassium   Date Value Ref Range Status   12/10/2024 4.1 3.5 - 5.1 mmol/L Final   02/16/2024 4.4 3.5 - 5.1 mmol/L Final   02/01/2023 3.7 3.5 - 5.1 mmol/L Final     Chloride   Date Value Ref Range Status   12/10/2024 106 95 - 110 mmol/L Final   02/16/2024 106 95 - 110 mmol/L Final   02/01/2023 106 95 - 110 mmol/L Final     CO2   Date Value Ref Range Status   12/10/2024 26 23 - 29 mmol/L Final   02/16/2024 26 23 - 29 mmol/L Final   02/01/2023 29 23 - 29 mmol/L Final     BUN   Date Value Ref Range Status   12/10/2024 22 8 - 23 mg/dL Final   02/16/2024 27 (H) 8 - 23 mg/dL Final   02/01/2023 23 8 - 23 mg/dL Final     Creatinine   Date Value Ref Range Status   12/10/2024 1.6 (H) 0.5 - 1.4 mg/dL Final   02/16/2024 1.7 (H) 0.5 - 1.4 mg/dL Final   02/01/2023 1.7 (H) 0.5 - 1.4  mg/dL Final     eGFR   Date Value Ref Range Status   12/10/2024 31.6 (A) >60 mL/min/1.73 m^2 Final   02/16/2024 29.6 (A) >60 mL/min/1.73 m^2 Final   02/01/2023 29.8 (A) >60 mL/min/1.73 m^2 Final     Calcium   Date Value Ref Range Status   12/10/2024 9.6 8.7 - 10.5 mg/dL Final   02/16/2024 9.6 8.7 - 10.5 mg/dL Final   02/01/2023 9.5 8.7 - 10.5 mg/dL Final     Phosphorus   Date Value Ref Range Status   12/10/2024 2.7 2.7 - 4.5 mg/dL Final     Albumin   Date Value Ref Range Status   12/10/2024 4.1 3.5 - 5.2 g/dL Final   02/16/2024 4.0 3.5 - 5.2 g/dL Final   02/01/2023 4.1 3.5 - 5.2 g/dL Final       Diagnostic Results:      Health Maintenance Due   Topic Date Due    TETANUS VACCINE  Never done    Shingles Vaccine (1 of 2) Never done    RSV Vaccine (Age 60+ and Pregnant patients) (1 - 1-dose 75+ series) Never done    DEXA Scan  01/19/2023    COVID-19 Vaccine (7 - 2024-25 season) 09/01/2024         ASSESSMENT & PLAN:   Ms. Lizbeth Hutchison is a 84 y.o. female     CKD - age related changes, hx of DM and HTN, gout  DM - well controlled  HTN - well controlled.  On ACEi, not on SGLT-2. Discussed SGLT-2, will reassess in 1 month as her CKD is very stable.      Gout - start allopurinol 100 mg qd, repeat uric acid 1 month  Rx steroid course for flares    Stage 3b chronic kidney disease  -     US Retroperitoneal Complete; Future; Expected date: 12/16/2024    Benign hypertension with chronic kidney disease, stage IV  -     Ambulatory referral/consult to Nephrology    Gout, unspecified cause, unspecified chronicity, unspecified site  -     allopurinoL (ZYLOPRIM) 100 MG tablet; Take 1 tablet (100 mg total) by mouth once daily.  Dispense: 90 tablet; Refill: 3  -     RENAL FUNCTION PANEL; Future; Expected date: 01/16/2025  -     Urinalysis; Future; Expected date: 01/16/2025  -     Protein / creatinine ratio, urine; Future; Expected date: 01/16/2025  -     Uric Acid; Future; Expected date: 01/16/2025    Other orders  -      predniSONE (DELTASONE) 10 MG tablet; Take 1 tablet (10 mg total) by mouth once daily.  Dispense: 5 tablet; Refill: 3        RTC in 1 month      Hakan Levy MD  Nephrology [facility] [place]

## 2025-01-02 ENCOUNTER — HOSPITAL ENCOUNTER (OUTPATIENT)
Dept: RADIOLOGY | Facility: HOSPITAL | Age: 85
Discharge: HOME | End: 2025-01-02
Attending: STUDENT IN AN ORGANIZED HEALTH CARE EDUCATION/TRAINING PROGRAM
Payer: MEDICARE

## 2025-01-02 DIAGNOSIS — N18.32 STAGE 3B CHRONIC KIDNEY DISEASE: ICD-10-CM

## 2025-01-02 PROCEDURE — 76770 US EXAM ABDO BACK WALL COMP: CPT | Mod: 26,,, | Performed by: STUDENT IN AN ORGANIZED HEALTH CARE EDUCATION/TRAINING PROGRAM

## 2025-01-02 PROCEDURE — 76770 US EXAM ABDO BACK WALL COMP: CPT | Mod: TC

## 2025-01-17 ENCOUNTER — LAB VISIT (OUTPATIENT)
Dept: LAB | Facility: HOSPITAL | Age: 85
End: 2025-01-17
Attending: STUDENT IN AN ORGANIZED HEALTH CARE EDUCATION/TRAINING PROGRAM
Payer: MEDICARE

## 2025-01-17 DIAGNOSIS — M10.9 GOUT, UNSPECIFIED CAUSE, UNSPECIFIED CHRONICITY, UNSPECIFIED SITE: ICD-10-CM

## 2025-01-17 LAB
ALBUMIN SERPL BCP-MCNC: 4 G/DL (ref 3.5–5.2)
ANION GAP SERPL CALC-SCNC: 10 MMOL/L (ref 8–16)
BUN SERPL-MCNC: 23 MG/DL (ref 8–23)
CALCIUM SERPL-MCNC: 9.5 MG/DL (ref 8.7–10.5)
CHLORIDE SERPL-SCNC: 107 MMOL/L (ref 95–110)
CO2 SERPL-SCNC: 26 MMOL/L (ref 23–29)
CREAT SERPL-MCNC: 1.5 MG/DL (ref 0.5–1.4)
EST. GFR  (NO RACE VARIABLE): 34.2 ML/MIN/1.73 M^2
GLUCOSE SERPL-MCNC: 86 MG/DL (ref 70–110)
PHOSPHATE SERPL-MCNC: 2.8 MG/DL (ref 2.7–4.5)
POTASSIUM SERPL-SCNC: 3.8 MMOL/L (ref 3.5–5.1)
SODIUM SERPL-SCNC: 143 MMOL/L (ref 136–145)
URATE SERPL-MCNC: 8.9 MG/DL (ref 2.4–5.7)

## 2025-01-17 PROCEDURE — 84550 ASSAY OF BLOOD/URIC ACID: CPT | Performed by: STUDENT IN AN ORGANIZED HEALTH CARE EDUCATION/TRAINING PROGRAM

## 2025-01-17 PROCEDURE — 80069 RENAL FUNCTION PANEL: CPT | Performed by: STUDENT IN AN ORGANIZED HEALTH CARE EDUCATION/TRAINING PROGRAM

## 2025-01-17 PROCEDURE — 36415 COLL VENOUS BLD VENIPUNCTURE: CPT | Mod: PO | Performed by: STUDENT IN AN ORGANIZED HEALTH CARE EDUCATION/TRAINING PROGRAM

## 2025-01-29 ENCOUNTER — OFFICE VISIT (OUTPATIENT)
Dept: NEPHROLOGY | Facility: CLINIC | Age: 85
End: 2025-01-29
Payer: MEDICARE

## 2025-01-29 VITALS
HEART RATE: 59 BPM | HEIGHT: 63 IN | DIASTOLIC BLOOD PRESSURE: 64 MMHG | RESPIRATION RATE: 18 BRPM | SYSTOLIC BLOOD PRESSURE: 126 MMHG | WEIGHT: 141.44 LBS | BODY MASS INDEX: 25.06 KG/M2 | OXYGEN SATURATION: 97 %

## 2025-01-29 DIAGNOSIS — N18.4 BENIGN HYPERTENSION WITH CHRONIC KIDNEY DISEASE, STAGE IV: ICD-10-CM

## 2025-01-29 DIAGNOSIS — E11.311 TYPE 2 DIABETES MELLITUS WITH RETINOPATHY AND MACULAR EDEMA, WITHOUT LONG-TERM CURRENT USE OF INSULIN, UNSPECIFIED LATERALITY, UNSPECIFIED RETINOPATHY SEVERITY: ICD-10-CM

## 2025-01-29 DIAGNOSIS — I12.9 BENIGN HYPERTENSION WITH CHRONIC KIDNEY DISEASE, STAGE IV: ICD-10-CM

## 2025-01-29 DIAGNOSIS — N18.32 STAGE 3B CHRONIC KIDNEY DISEASE: Primary | ICD-10-CM

## 2025-01-29 PROCEDURE — 99999 PR PBB SHADOW E&M-EST. PATIENT-LVL III: CPT | Mod: PBBFAC,,, | Performed by: STUDENT IN AN ORGANIZED HEALTH CARE EDUCATION/TRAINING PROGRAM

## 2025-01-29 PROCEDURE — 99213 OFFICE O/P EST LOW 20 MIN: CPT | Mod: S$PBB,,, | Performed by: STUDENT IN AN ORGANIZED HEALTH CARE EDUCATION/TRAINING PROGRAM

## 2025-01-29 PROCEDURE — 99213 OFFICE O/P EST LOW 20 MIN: CPT | Mod: PBBFAC | Performed by: STUDENT IN AN ORGANIZED HEALTH CARE EDUCATION/TRAINING PROGRAM

## 2025-01-29 NOTE — PROGRESS NOTES
Subjective     Chief Complaint: CKD    History of Present Illness:  Ms. Lizbeth Hutchison is a 84 y.o. female with active medical diagnosis of alpha thalassemia trait, anxiety, HTN, T2DM, gout    Interval history: has been doing well. Started taking the allopurinol and after 48 hours started having diarrhea, she has since stopped the allopurinol.     #CKD4  Baseline creatinine 1.5-1.7  UA 2+ leuks, 1 RBC, 7 WBC, 1 squamous epithelial cell  UPCR 0    Creatinine   Date Value Ref Range Status   01/17/2025 1.5 (H) 0.5 - 1.4 mg/dL Final   12/10/2024 1.6 (H) 0.5 - 1.4 mg/dL Final   02/16/2024 1.7 (H) 0.5 - 1.4 mg/dL Final     eGFR   Date Value Ref Range Status   01/17/2025 34.2 (A) >60 mL/min/1.73 m^2 Final   12/10/2024 31.6 (A) >60 mL/min/1.73 m^2 Final   02/16/2024 29.6 (A) >60 mL/min/1.73 m^2 Final     Prot/Creat Ratio, Urine   Date Value Ref Range Status   01/18/2025 Unable to calculate 0.00 - 0.20 Final   12/10/2024 Unable to calculate 0.00 - 0.20 Final       Imaging: RP US 1/2/2025 - right kidney 9.5 with left renal parenchymal calcification. Left kidney 9.2 cm.     #Anemia  Hemoglobin   Date Value Ref Range Status   12/10/2024 13.5 12.0 - 16.0 g/dL Final     Iron   Date Value Ref Range Status   12/10/2024 75 30 - 160 ug/dL Final     Transferrin   Date Value Ref Range Status   12/10/2024 204 200 - 375 mg/dL Final     TIBC   Date Value Ref Range Status   12/10/2024 302 250 - 450 ug/dL Final     Saturated Iron   Date Value Ref Range Status   12/10/2024 25 20 - 50 % Final     Ferritin   Date Value Ref Range Status   12/10/2024 196 20.0 - 300.0 ng/mL Final     #Secondary hyperparathyroidism  Calcium   Date Value Ref Range Status   01/17/2025 9.5 8.7 - 10.5 mg/dL Final     Phosphorus   Date Value Ref Range Status   01/17/2025 2.8 2.7 - 4.5 mg/dL Final     PTH, Intact   Date Value Ref Range Status   12/10/2024 132.8 (H) 9.0 - 77.0 pg/mL Final     #DM  Hemoglobin A1c:5.9    #HTN  Home medications: lisinopril 40, HCTZ  25  120/60s at home.     Review of Systems   Constitutional:  Negative for chills and fever.   HENT:  Negative for ear discharge and ear pain.    Eyes:  Negative for blurred vision and double vision.   Respiratory:  Negative for cough and shortness of breath.    Cardiovascular:  Negative for chest pain and palpitations.   Gastrointestinal:  Negative for abdominal pain, constipation, diarrhea, nausea and vomiting.   Genitourinary:  Negative for dysuria and frequency.   Musculoskeletal:  Negative for myalgias and neck pain.   Skin:  Negative for itching and rash.   Neurological:  Negative for tingling and headaches.             PAST HISTORY:     Past Medical History:   Diagnosis Date    Alpha thalassemia trait     Anxiety     Atherosclerosis of abdominal aorta     noted on CT scan 2016    DDD (degenerative disc disease), lumbar     chronic low back pain    Diverticulosis     History of colonic polyps     last colonoscopy  - normal    Hyperlipidemia LDL goal <100     unable to tolerate statins or Welchol    Hypertension     Osteopenia     no need for medication at this time - last BMD 2010    Overweight     Type II or unspecified type diabetes mellitus without mention of complication, not stated as uncontrolled     Vitamin D deficiency disease     resolved with supplementation       Past Surgical History:   Procedure Laterality Date    CATARACT EXTRACTION Bilateral 19    samuel      bilateral    HEMORRHOID SURGERY  1970    KELOID EXCISION  ,     abdominal wall    TOTAL ABDOMINAL HYSTERECTOMY      TRIGGER FINGER RELEASE      left hand       Family History   Adopted: Yes   Problem Relation Name Age of Onset    Heart failure Mother      Alzheimer's disease Mother      Other Sister Jocelyne          from too much anesthesia    Arthritis Sister Sally         back problems    Hypertension Sister Sally     Transient ischemic attack Sister Sally     Congenital heart disease  Brother Mark     Stroke Brother Azalia     Coronary artery disease Brother Bonifacio         s/p stenting    Stroke Brother Bonifacio     Breast cancer Maternal Grandmother      Other Son          had eosinophilic granulomatosis -  shortly after lung transplant    Colon cancer Other grandson - Dmitriy     Other Daughter          stiffman syndrome    Diabetes Neg Hx         Social History     Socioeconomic History    Marital status:     Number of children: 2   Occupational History    Occupation:    Tobacco Use    Smoking status: Never    Smokeless tobacco: Never   Substance and Sexual Activity    Alcohol use: No    Drug use: Never     Social Drivers of Health     Financial Resource Strain: Low Risk  (3/20/2024)    Overall Financial Resource Strain (CARDIA)     Difficulty of Paying Living Expenses: Not hard at all   Food Insecurity: No Food Insecurity (3/20/2024)    Hunger Vital Sign     Worried About Running Out of Food in the Last Year: Never true     Ran Out of Food in the Last Year: Never true   Transportation Needs: No Transportation Needs (3/20/2024)    PRAPARE - Transportation     Lack of Transportation (Medical): No     Lack of Transportation (Non-Medical): No   Physical Activity: Unknown (3/20/2024)    Exercise Vital Sign     Days of Exercise per Week: 3 days   Stress: No Stress Concern Present (3/20/2024)    [employer] - Occupational Stress Questionnaire     Feeling of Stress : Only a little   Housing Stability: Low Risk  (3/20/2024)    Housing Stability Vital Sign     Unable to Pay for Housing in the Last Year: No     Number of Places Lived in the Last Year: 1     Unstable Housing in the Last Year: No       MEDICATIONS & ALLERGIES:     Current Outpatient Medications on File Prior to Visit   Medication Sig    allopurinoL (ZYLOPRIM) 100 MG tablet Take 1 tablet (100 mg total) by mouth once daily.    CALCIUM ORAL Take 1 tablet by mouth once daily.  (Patient not taking: Reported on 12/16/2024)    colestipoL (COLESTID) 1 gram Tab Take 2 tablets (2 g total) by mouth 2 (two) times daily.    cyanocobalamin (VITAMIN B-12) 1000 MCG tablet Take 100 mcg by mouth once daily. (Patient not taking: Reported on 12/16/2024)    diclofenac sodium (VOLTAREN) 1 % Gel Apply 2 g topically 3 (three) times daily. for 10 days    fluticasone (VERAMYST) 27.5 mcg/actuation nasal spray 2 sprays by Nasal route once daily.    fluticasone propionate (FLONASE) 50 mcg/actuation nasal spray     hydroCHLOROthiazide (HYDRODIURIL) 25 MG tablet Take 1 tablet (25 mg total) by mouth once daily.    lisinopriL (PRINIVIL,ZESTRIL) 40 MG tablet Take 1 tablet (40 mg total) by mouth once daily.    MULTIVITAMIN/IRON/FOLIC ACID (CENTRUM COMPLETE ORAL) Take 1 tablet by mouth once daily. (Patient not taking: Reported on 12/16/2024)    omega-3 fatty acids 1,000 mg Cap Take by mouth. 1 Capsule Oral Every day    predniSONE (DELTASONE) 10 MG tablet Take 1 tablet (10 mg total) by mouth once daily.    vitamin D 185 MG Tab Take 185 mg by mouth once daily.     No current facility-administered medications on file prior to visit.       Review of patient's allergies indicates:   Allergen Reactions    Cholesterol analogues Other (See Comments)     Muscle spasam    Clindamycin Hives    Statins-hmg-coa reductase inhibitors Other (See Comments)    Sulfa (sulfonamide antibiotics)     Tramadol Nausea And Vomiting    Welchol [colesevelam] Other (See Comments)    Codeine Rash    Penicillins Rash       OBJECTIVE:     Vital Signs:  There were no vitals filed for this visit.    There is no height or weight on file to calculate BMI.     Physical Exam  Constitutional:       General: She is not in acute distress.     Appearance: Normal appearance. She is not ill-appearing or diaphoretic.   HENT:      Head: Normocephalic and atraumatic.      Mouth/Throat:      Pharynx: No oropharyngeal exudate or posterior oropharyngeal erythema.    Eyes:      General: No scleral icterus.        Right eye: No discharge.         Left eye: No discharge.      Extraocular Movements: Extraocular movements intact.      Conjunctiva/sclera: Conjunctivae normal.      Pupils: Pupils are equal, round, and reactive to light.   Neck:      Vascular: No JVD.      Trachea: No tracheal deviation.   Cardiovascular:      Rate and Rhythm: Normal rate and regular rhythm.      Heart sounds: No murmur heard.  Pulmonary:      Effort: Pulmonary effort is normal. No respiratory distress.      Breath sounds: Normal breath sounds. No wheezing or rales.   Abdominal:      General: Abdomen is flat. Bowel sounds are normal. There is no distension.      Palpations: Abdomen is soft. There is no mass.      Tenderness: There is no abdominal tenderness.   Musculoskeletal:         General: No swelling, tenderness or deformity. Normal range of motion.      Cervical back: Normal range of motion and neck supple.   Lymphadenopathy:      Cervical: No cervical adenopathy.   Skin:     General: Skin is warm and dry.      Coloration: Skin is not jaundiced or pale.      Findings: No bruising, erythema or rash.   Neurological:      General: No focal deficit present.      Mental Status: She is alert and oriented to person, place, and time. Mental status is at baseline.      Cranial Nerves: No cranial nerve deficit.         Laboratory  Sodium   Date Value Ref Range Status   01/17/2025 143 136 - 145 mmol/L Final   12/10/2024 143 136 - 145 mmol/L Final   02/16/2024 142 136 - 145 mmol/L Final     Potassium   Date Value Ref Range Status   01/17/2025 3.8 3.5 - 5.1 mmol/L Final   12/10/2024 4.1 3.5 - 5.1 mmol/L Final   02/16/2024 4.4 3.5 - 5.1 mmol/L Final     Chloride   Date Value Ref Range Status   01/17/2025 107 95 - 110 mmol/L Final   12/10/2024 106 95 - 110 mmol/L Final   02/16/2024 106 95 - 110 mmol/L Final     CO2   Date Value Ref Range Status   01/17/2025 26 23 - 29 mmol/L Final   12/10/2024 26 23 - 29  mmol/L Final   02/16/2024 26 23 - 29 mmol/L Final     BUN   Date Value Ref Range Status   01/17/2025 23 8 - 23 mg/dL Final   12/10/2024 22 8 - 23 mg/dL Final   02/16/2024 27 (H) 8 - 23 mg/dL Final     Creatinine   Date Value Ref Range Status   01/17/2025 1.5 (H) 0.5 - 1.4 mg/dL Final   12/10/2024 1.6 (H) 0.5 - 1.4 mg/dL Final   02/16/2024 1.7 (H) 0.5 - 1.4 mg/dL Final     eGFR   Date Value Ref Range Status   01/17/2025 34.2 (A) >60 mL/min/1.73 m^2 Final   12/10/2024 31.6 (A) >60 mL/min/1.73 m^2 Final   02/16/2024 29.6 (A) >60 mL/min/1.73 m^2 Final     Calcium   Date Value Ref Range Status   01/17/2025 9.5 8.7 - 10.5 mg/dL Final   12/10/2024 9.6 8.7 - 10.5 mg/dL Final   02/16/2024 9.6 8.7 - 10.5 mg/dL Final     Phosphorus   Date Value Ref Range Status   01/17/2025 2.8 2.7 - 4.5 mg/dL Final   12/10/2024 2.7 2.7 - 4.5 mg/dL Final     Albumin   Date Value Ref Range Status   01/17/2025 4.0 3.5 - 5.2 g/dL Final   12/10/2024 4.1 3.5 - 5.2 g/dL Final   02/16/2024 4.0 3.5 - 5.2 g/dL Final       Diagnostic Results:      Health Maintenance Due   Topic Date Due    TETANUS VACCINE  Never done    Shingles Vaccine (1 of 2) Never done    RSV Vaccine (Age 60+ and Pregnant patients) (1 - 1-dose 75+ series) Never done    DEXA Scan  01/19/2023    COVID-19 Vaccine (7 - 2024-25 season) 09/01/2024    Diabetes Urine Screening  02/16/2025    Lipid Panel  02/16/2025         ASSESSMENT & PLAN:   Ms. Nieves TASHA Hutchison is a 84 y.o. female     CKD 3b - age related changes, HTN, T2DM  On ACE  Has fecal incontinence. Hold on SGLT-2    US with renal calcifications - repeat US 6 months  Continue kidney friendly habits (water and low salt intake)    Anemia - repeat labs 6 months  Secondary hyperpara - repeat labs 6 months    Stage 3b chronic kidney disease  -     RENAL FUNCTION PANEL; Future; Expected date: 07/29/2025  -     Urinalysis; Future; Expected date: 07/29/2025  -     Protein / creatinine ratio, urine; Future; Expected date:  07/29/2025  -     CBC Auto Differential; Future; Expected date: 07/29/2025  -     Iron and TIBC; Future; Expected date: 07/29/2025  -     PTH, intact; Future; Expected date: 07/29/2025  -     Ferritin; Future; Expected date: 01/29/2025  -     US Retroperitoneal Complete; Future; Expected date: 07/29/2025    Benign hypertension with chronic kidney disease, stage IV    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

## 2025-01-30 DIAGNOSIS — Z00.00 ENCOUNTER FOR MEDICARE ANNUAL WELLNESS EXAM: ICD-10-CM

## 2025-02-25 DIAGNOSIS — Z00.00 ROUTINE MEDICAL EXAM: ICD-10-CM

## 2025-02-25 RX ORDER — HYDROCHLOROTHIAZIDE 25 MG/1
25 TABLET ORAL
Qty: 90 TABLET | Refills: 1 | Status: SHIPPED | OUTPATIENT
Start: 2025-02-25

## 2025-02-25 RX ORDER — LISINOPRIL 40 MG/1
40 TABLET ORAL
Qty: 90 TABLET | Refills: 1 | Status: SHIPPED | OUTPATIENT
Start: 2025-02-25

## 2025-02-25 NOTE — TELEPHONE ENCOUNTER
Care Due:                  Date            Visit Type   Department     Provider  --------------------------------------------------------------------------------                                [facility]                              PRIMARY      MED/ INTERNAL  Laurentia Kaykay  Last Visit: 11-      CARE ([facility])   MED/ PEDS      Alykeldeja Cruz                              [facility]                              PRIMARY      MED/ INTERNAL  Laurentia Kaykay  Next Visit: 07-      CARE ([facility])   MED/ PEDS      Leslieer  Anthony                                                            Last  Test          Frequency    Reason                     Performed    Due Date  --------------------------------------------------------------------------------    CMP.........  12 months..  colestipoL...............  02-   02-    Lipid Panel.  12 months..  colestipoL...............  02-   02-    [employer] Embedded Care Due Messages. Reference number: [id number].   2/25/2025 12:29:00 AM CST

## 2025-02-25 NOTE — TELEPHONE ENCOUNTER
Refill Routing Note   Medication(s) are not appropriate for processing by [facility] for the following reason(s):        Required labs abnormal    [facility] action(s):  Defer     Requires labs : Yes             Appointments  past 12m or future 3m with PCP    Date Provider   Last Visit   11/18/2024 Nusrat Cruz MD   Next Visit   7/16/2025 Nusrat Cruz MD   ED visits in past 90 days: 0        Note composed:2:48 AM 02/25/2025

## 2025-03-11 DIAGNOSIS — E78.5 HYPERLIPIDEMIA LDL GOAL <100: ICD-10-CM

## 2025-03-11 RX ORDER — COLESTIPOL HYDROCHLORIDE 1 G/1
TABLET ORAL
Qty: 360 TABLET | Refills: 0 | Status: SHIPPED | OUTPATIENT
Start: 2025-03-11

## 2025-03-11 NOTE — TELEPHONE ENCOUNTER
Refill Routing Note   Medication(s) are not appropriate for processing by [facility] for the following reason(s):        Required labs outdated    [facility] action(s):  Defer               Appointments  past 12m or future 3m with PCP    Date Provider   Last Visit   11/18/2024 Nusrat Cruz MD   Next Visit   7/16/2025 Nusrat Cruz MD   ED visits in past 90 days: 0        Note composed:2:18 PM 03/11/2025

## 2025-03-11 NOTE — TELEPHONE ENCOUNTER
No care due was identified.  [facility] Embedded Care Due Messages. Reference number: [id number].   3/11/2025 12:26:00 AM CDT

## 2025-03-27 ENCOUNTER — OFFICE VISIT (OUTPATIENT)
Dept: FAMILY MEDICINE | Facility: CLINIC | Age: 85
End: 2025-03-27
Payer: MEDICARE

## 2025-03-27 VITALS
OXYGEN SATURATION: 97 % | DIASTOLIC BLOOD PRESSURE: 62 MMHG | SYSTOLIC BLOOD PRESSURE: 128 MMHG | WEIGHT: 141.63 LBS | BODY MASS INDEX: 25.09 KG/M2 | HEART RATE: 62 BPM | HEIGHT: 63 IN | TEMPERATURE: 98 F

## 2025-03-27 DIAGNOSIS — N18.4 BENIGN HYPERTENSION WITH CHRONIC KIDNEY DISEASE, STAGE IV: ICD-10-CM

## 2025-03-27 DIAGNOSIS — E78.5 HYPERLIPIDEMIA LDL GOAL <100: ICD-10-CM

## 2025-03-27 DIAGNOSIS — I12.9 BENIGN HYPERTENSION WITH CHRONIC KIDNEY DISEASE, STAGE IV: ICD-10-CM

## 2025-03-27 DIAGNOSIS — E11.311 TYPE 2 DIABETES MELLITUS WITH RETINOPATHY AND MACULAR EDEMA, WITHOUT LONG-TERM CURRENT USE OF INSULIN, UNSPECIFIED LATERALITY, UNSPECIFIED RETINOPATHY SEVERITY: ICD-10-CM

## 2025-03-27 DIAGNOSIS — D22.9 ATYPICAL MOLE: Primary | ICD-10-CM

## 2025-03-27 PROCEDURE — G2211 COMPLEX E/M VISIT ADD ON: HCPCS | Mod: S$PBB,,,

## 2025-03-27 PROCEDURE — 99214 OFFICE O/P EST MOD 30 MIN: CPT | Mod: PBBFAC,PO

## 2025-03-27 PROCEDURE — 99999 PR PBB SHADOW E&M-EST. PATIENT-LVL IV: CPT | Mod: PBBFAC,,,

## 2025-03-27 PROCEDURE — 99214 OFFICE O/P EST MOD 30 MIN: CPT | Mod: S$PBB,,,

## 2025-03-27 NOTE — PROGRESS NOTES
HPI     Chief Complaint:  Chief Complaint   Patient presents with    Mole       Lizbeth Hutchison is a 84 y.o. female with multiple medical diagnoses as listed in the medical history and problem list that presents for   Chief Complaint   Patient presents with    Mole    .     Patient is know to me with her last appointment with me on 10/4/2024.     HPI  Pt presents for mole.  Mole to right leg that has become raised over the past week.  Reports multiple moles and skin tags spread throughout the body.  Denies bleeding, itching or change in size.         Assessment & Plan     Problem List Items Addressed This Visit       Benign hypertension with chronic kidney disease, stage IV  BP in clinic today 128/62.  The current medical regimen is effective;  continue present plan and medications.      Hyperlipidemia LDL goal <100  Stable. Diet controlled. Will our lipid panel.    Overview   unable to tolerate statins or Welchol         Relevant Orders        LIPID PANEL     Other Visit Diagnoses         Atypical mole    -  Primary  Mole to right leg that has become raised over the past week.  Will refer to dermatology.    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 Will order DM urine screen.  Hemoglobin A1C   Date Value Ref Range Status   11/18/2024 5.9 (H) 4.0 - 5.6 % Final     Comment:     ADA Screening Guidelines:  5.7-6.4%  Consistent with prediabetes  >or=6.5%  Consistent with diabetes    High levels of fetal hemoglobin interfere with the HbA1C  assay. Heterozygous hemoglobin variants (HbS, HgC, etc)do  not significantly interfere with this assay.   However, presence of multiple variants may affect accuracy.     02/16/2024 6.0 (H) 4.0 - 5.6 % Final     Comment:     ADA Screening Guidelines:  5.7-6.4%  Consistent with  prediabetes  >or=6.5%  Consistent with diabetes    High levels of fetal hemoglobin interfere with the HbA1C  assay. Heterozygous hemoglobin variants (HbS, HgC, etc)do  not significantly interfere with this assay.   However, presence of multiple variants may affect accuracy.     08/07/2023 5.9 (H) 4.0 - 5.6 % Final     Comment:     ADA Screening Guidelines:  5.7-6.4%  Consistent with prediabetes  >or=6.5%  Consistent with diabetes    High levels of fetal hemoglobin interfere with the HbA1C  assay. Heterozygous hemoglobin variants (HbS, HgC, etc)do  not significantly interfere with this assay.   However, presence of multiple variants may affect accuracy.         Microalbumin/Creatinine Ratio, Urine              --------------------------------------------      Health Maintenance:  Health Maintenance         Date Due Completion Date    TETANUS VACCINE Never done ---    Shingles Vaccine (1 of 2) Never done ---    RSV Vaccine (Age 60+ and Pregnant patients) (1 - 1-dose 75+ series) Never done ---    DEXA Scan 01/19/2023 1/19/2021    Override on 10/11/2011: Done (osteopenia)    COVID-19 Vaccine (7 - 2024-25 season) 09/01/2024 10/2/2023    Diabetes Urine Screening 02/16/2025 2/16/2024    Lipid Panel 02/16/2025 2/16/2024    Hemoglobin A1c 05/18/2025 11/18/2024    Diabetic Eye Exam 10/10/2025 10/10/2024    Override on 3/18/2015: Done    Override on 3/4/2014: Done    Override on 1/28/2013: Done            Health maintenance reviewed, Diabetic urine screening ordered, and Lipid panel ordered    Follow Up:  No follow-ups on file.    Exam     Review of Systems:  (as noted above)  Review of Systems    Physical Exam:   Physical Exam  Constitutional:       General: She is not in acute distress.     Appearance: Normal appearance. She is not ill-appearing, toxic-appearing or diaphoretic.   Cardiovascular:      Rate and Rhythm: Normal rate.   Pulmonary:      Effort: Pulmonary effort is normal.   Skin:     Findings: Lesion present.       "Comments: Mole to right thigh, irregular borders, hyperpigmented, raised   Neurological:      Mental Status: She is alert.       Vitals:    25 1104   BP: 128/62   BP Location: Right arm   Patient Position: Sitting   Pulse: 62   Temp: 97.7 °F (36.5 °C)   TempSrc: Oral   SpO2: 97%   Weight: 64.3 kg (141 lb 10.3 oz)   Height: 5' 3" (1.6 m)      Body mass index is 25.09 kg/m².        History     Past Medical History:  Past Medical History:   Diagnosis Date    Alpha thalassemia trait     Anxiety     Atherosclerosis of abdominal aorta     noted on CT scan 2016    DDD (degenerative disc disease), lumbar     chronic low back pain    Diverticulosis     History of colonic polyps     last colonoscopy  - normal    Hyperlipidemia LDL goal <100     unable to tolerate statins or Welchol    Hypertension     Osteopenia     no need for medication at this time - last BMD 2010    Overweight     Type II or unspecified type diabetes mellitus without mention of complication, not stated as uncontrolled     Vitamin D deficiency disease     resolved with supplementation       Past Surgical History:  Past Surgical History:   Procedure Laterality Date    CATARACT EXTRACTION Bilateral 19    samuel      bilateral    HEMORRHOID SURGERY  1970    KELOID EXCISION  ,     abdominal wall    TOTAL ABDOMINAL HYSTERECTOMY      TRIGGER FINGER RELEASE      left hand       Social History:  Social History[1]    Family History:  Family History   Adopted: Yes   Problem Relation Name Age of Onset    Heart failure Mother      Alzheimer's disease Mother      Other Sister Jocelyne          from too much anesthesia    Arthritis Sister Sally         back problems    Hypertension Sister Sally     Transient ischemic attack Sister Sally     Congenital heart disease Brother Mark     Stroke Brother Vieyra     Coronary artery disease Brother Bonifacio         s/p stenting    Stroke Brother Bonifacio     Breast cancer " Maternal Grandmother      Other Son          had eosinophilic granulomatosis -  shortly after lung transplant    Colon cancer Other grandson - Dmitriy     Other Daughter          stiffman syndrome    Diabetes Neg Hx         Allergies and Medications: (updated and reviewed)  Review of patient's allergies indicates:   Allergen Reactions    Cholesterol analogues Other (See Comments)     Muscle spasam    Clindamycin Hives    Statins-hmg-coa reductase inhibitors Other (See Comments)    Sulfa (sulfonamide antibiotics)     Tramadol Nausea And Vomiting    Welchol [colesevelam] Other (See Comments)    Codeine Rash    Penicillins Rash     Current Medications[2]    Patient Care Team:  Nusrat Cruz MD as PCP - General (Internal Medicine)  Shira Barksdale OD (Ophthalmology)         - The patient is given an After Visit Summary that lists all medications with directions, allergies, education, orders placed during this encounter and follow-up instructions.      - I have reviewed the patient's medical information including past medical, family, and social history sections including the medications and allergies.      - We discussed the patient's current medications.     This note was created by combination of typed  and MModal dictation.  Transcription errors may be present.  If there are any questions, please contact me.       Luna Barber NP                      [1]   Social History  Socioeconomic History    Marital status:     Number of children: 2   Occupational History    Occupation:    Tobacco Use    Smoking status: Never    Smokeless tobacco: Never   Substance and Sexual Activity    Alcohol use: No    Drug use: Never     Social Drivers of Health     Financial Resource Strain: Low Risk  (3/20/2024)    Overall Financial Resource Strain (CARDIA)     Difficulty of Paying Living Expenses: Not hard at all   Food Insecurity: No Food Insecurity (3/20/2024)    Hunger Vital Sign      Worried About Running Out of Food in the Last Year: Never true     Ran Out of Food in the Last Year: Never true   Transportation Needs: No Transportation Needs (3/20/2024)    PRAPARE - Transportation     Lack of Transportation (Medical): No     Lack of Transportation (Non-Medical): No   Physical Activity: Unknown (3/20/2024)    Exercise Vital Sign     Days of Exercise per Week: 3 days   Stress: No Stress Concern Present (3/20/2024)    [employer] - Occupational Stress Questionnaire     Feeling of Stress : Only a little   Housing Stability: Low Risk  (3/20/2024)    Housing Stability Vital Sign     Unable to Pay for Housing in the Last Year: No     Number of Places Lived in the Last Year: 1     Unstable Housing in the Last Year: No   [2]   Current Outpatient Medications   Medication Sig Dispense Refill    colestipoL (COLESTID) 1 gram Tab TAKE 2 TABLETS BY MOUTH 2 TIMES DAILY. 360 tablet 0    fluticasone (VERAMYST) 27.5 mcg/actuation nasal spray 2 sprays by Nasal route once daily.      fluticasone propionate (FLONASE) 50 mcg/actuation nasal spray       hydroCHLOROthiazide (HYDRODIURIL) 25 MG tablet TAKE 1 TABLET BY MOUTH EVERY DAY 90 tablet 1    lisinopriL (PRINIVIL,ZESTRIL) 40 MG tablet TAKE 1 TABLET BY MOUTH EVERY DAY 90 tablet 1    omega-3 fatty acids 1,000 mg Cap Take by mouth. 1 Capsule Oral Every day      predniSONE (DELTASONE) 10 MG tablet Take 1 tablet (10 mg total) by mouth once daily. 5 tablet 3    vitamin D 185 MG Tab Take 185 mg by mouth once daily.      CALCIUM ORAL Take 1 tablet by mouth once daily.      cyanocobalamin (VITAMIN B-12) 1000 MCG tablet Take 100 mcg by mouth once daily.      diclofenac sodium (VOLTAREN) 1 % Gel Apply 2 g topically 3 (three) times daily. for 10 days 100 g 0    MULTIVITAMIN/IRON/FOLIC ACID (CENTRUM COMPLETE ORAL) Take 1 tablet by mouth once daily.       No current facility-administered medications for this visit.

## 2025-03-28 ENCOUNTER — TELEPHONE (OUTPATIENT)
Dept: NEPHROLOGY | Facility: CLINIC | Age: 85
End: 2025-03-28
Payer: MEDICARE

## 2025-03-28 NOTE — TELEPHONE ENCOUNTER
I left a message for this patient in reference of arranging a six month follow up with Dr. Levy. Also, ultrasound and labs prior to the appointment. Instructed to contact [phone].    -Fariha Ha MA

## 2025-04-02 ENCOUNTER — TELEPHONE (OUTPATIENT)
Dept: NEPHROLOGY | Facility: CLINIC | Age: 85
End: 2025-04-02
Payer: MEDICARE

## 2025-05-21 ENCOUNTER — OFFICE VISIT (OUTPATIENT)
Dept: URGENT CARE | Facility: CLINIC | Age: 85
End: 2025-05-21
Payer: MEDICARE

## 2025-05-21 VITALS
RESPIRATION RATE: 16 BRPM | HEIGHT: 63 IN | HEART RATE: 73 BPM | BODY MASS INDEX: 24.8 KG/M2 | DIASTOLIC BLOOD PRESSURE: 56 MMHG | WEIGHT: 140 LBS | OXYGEN SATURATION: 96 % | SYSTOLIC BLOOD PRESSURE: 108 MMHG | TEMPERATURE: 98 F

## 2025-05-21 DIAGNOSIS — M10.9 ACUTE GOUT OF RIGHT ANKLE, UNSPECIFIED CAUSE: Primary | ICD-10-CM

## 2025-05-21 PROCEDURE — 99214 OFFICE O/P EST MOD 30 MIN: CPT | Mod: 25,S$GLB,,

## 2025-05-21 PROCEDURE — 96372 THER/PROPH/DIAG INJ SC/IM: CPT | Mod: S$GLB,,, | Performed by: FAMILY MEDICINE

## 2025-05-21 RX ORDER — DEXAMETHASONE SODIUM PHOSPHATE 10 MG/ML
4 INJECTION INTRAMUSCULAR; INTRAVENOUS
Status: COMPLETED | OUTPATIENT
Start: 2025-05-21 | End: 2025-05-21

## 2025-05-21 RX ORDER — DEXAMETHASONE SODIUM PHOSPHATE 4 MG/ML
4 INJECTION, SOLUTION INTRA-ARTICULAR; INTRALESIONAL; INTRAMUSCULAR; INTRAVENOUS; SOFT TISSUE
Status: DISCONTINUED | OUTPATIENT
Start: 2025-05-21 | End: 2025-05-21

## 2025-05-21 RX ORDER — PREDNISONE 10 MG/1
10 TABLET ORAL DAILY
Qty: 5 TABLET | Refills: 0 | Status: SHIPPED | OUTPATIENT
Start: 2025-05-21 | End: 2025-05-26

## 2025-05-21 RX ADMIN — DEXAMETHASONE SODIUM PHOSPHATE 4 MG: 10 INJECTION INTRAMUSCULAR; INTRAVENOUS at 02:05

## 2025-05-21 NOTE — PROGRESS NOTES
"Subjective:      Patient ID: Lizbeth Hutchison is a 85 y.o. female.    Vitals:  height is 5' 3" (1.6 m) and weight is 63.5 kg (140 lb). Her oral temperature is 97.7 °F (36.5 °C). Her blood pressure is 108/56 (abnormal) and her pulse is 73. Her respiration is 16 and oxygen saturation is 96%.     Chief Complaint: Ankle Pain    84 y/o F with hx of gout, CKD stage IV, T2DM here for atraumatic R ankle pain swelling that started 10 days ago. Initially painful to the lateral aspect of the right ankle, but now having pain to medial aspect of ankle.  She took 5 days of prednisone 10 mg and symptoms improved temporarily, but returned the day after she completed the course of prednisone.  Denies erythema, bruising, numbness or tingling.    Ankle Pain   The incident occurred 5 to 7 days ago. There was no injury mechanism. The pain is present in the right ankle. The quality of the pain is described as cramping, burning and stabbing. The pain is at a severity of 10/10. The pain has been Constant since onset. Pertinent negatives include no inability to bear weight, loss of motion, loss of sensation, muscle weakness, numbness or tingling. She reports no foreign bodies present. The symptoms are aggravated by palpation, movement and weight bearing. She has tried acetaminophen, elevation and ice for the symptoms. The treatment provided no relief.       Constitution: Negative for chills and fever.   Musculoskeletal:  Positive for joint pain, joint swelling and abnormal ROM of joint. Negative for trauma.   Neurological:  Negative for numbness and tingling.      Objective:     Physical Exam   Constitutional: She is oriented to person, place, and time. She appears well-developed.   HENT:   Head: Normocephalic and atraumatic.   Ears:   Right Ear: External ear normal.   Left Ear: External ear normal.   Nose: Nose normal.   Mouth/Throat: Oropharynx is clear and moist.   Eyes: Conjunctivae, EOM and lids are normal.   Neck: Trachea normal " and phonation normal. Neck supple.   Musculoskeletal:      Comments: Swelling over the lateral aspect of R ankle, but there is tenderness over the medial aspect. No erythema or warmth. Neurovascularly intact distally.   Neurological: She is alert and oriented to person, place, and time.   Skin: Skin is warm, dry and intact.   Psychiatric: Her speech is normal and behavior is normal. Judgment and thought content normal.   Nursing note and vitals reviewed.    Assessment:     1. Acute gout of right ankle, unspecified cause      Plan:     Acute gout of right ankle, unspecified cause  -     Discontinue: dexAMETHasone injection 4 mg  -     predniSONE (DELTASONE) 10 MG tablet; Take 1 tablet (10 mg total) by mouth once daily. for 5 days  Dispense: 5 tablet; Refill: 0  -     dexAMETHasone injection 4 mg    Hx of gout. Atraumatic R ankle pain and swelling x11 days. Reviewed previous records. Diabetes well controlled. Dexamethasone in clinic, and oral steroids to take tomorrow. Unable to take NSAIDs due to CKD.           Patient Instructions   - You received a steroid today.  This can elevate your blood pressure, elevate your blood sugar, water weight gain, nervous energy, redness to the face and dimpling of the skin where the shot goes in.  - If you have diabetes, please check you blood sugar frequently.  - If you have high blood pressure, please check your blood pressure frequently.     - Follow up with your PCP or specialty clinic as directed in the next 1-2 weeks if not improved or as needed.  You can call [phone] to schedule an appointment with the appropriate provider.    - Go to the ER or seek medical attention immediately if you develop new or worsening symptoms.    - You must understand that you have received an Urgent Care treatment only and that you may be released before all of your medical problems are known or treated.   - You, the patient, will arrange for follow up care as instructed.   - If your  condition worsens or fails to improve we recommend that you receive another evaluation at the ER immediately or contact your PCP to discuss your concerns or return here.

## 2025-05-21 NOTE — PATIENT INSTRUCTIONS
- You received a steroid today.  This can elevate your blood pressure, elevate your blood sugar, water weight gain, nervous energy, redness to the face and dimpling of the skin where the shot goes in.  - If you have diabetes, please check you blood sugar frequently.  - If you have high blood pressure, please check your blood pressure frequently.     - Follow up with your PCP or specialty clinic as directed in the next 1-2 weeks if not improved or as needed.  You can call [phone] to schedule an appointment with the appropriate provider.    - Go to the ER or seek medical attention immediately if you develop new or worsening symptoms.    - You must understand that you have received an Urgent Care treatment only and that you may be released before all of your medical problems are known or treated.   - You, the patient, will arrange for follow up care as instructed.   - If your condition worsens or fails to improve we recommend that you receive another evaluation at the ER immediately or contact your PCP to discuss your concerns or return here.

## 2025-06-20 ENCOUNTER — PATIENT OUTREACH (OUTPATIENT)
Dept: ADMINISTRATIVE | Facility: HOSPITAL | Age: 85
End: 2025-06-20
Payer: MEDICARE

## 2025-06-20 DIAGNOSIS — E11.9 DIABETES MELLITUS WITHOUT COMPLICATION: Primary | ICD-10-CM

## 2025-06-20 NOTE — LETTER
AUTHORIZATION FOR RELEASE OF   CONFIDENTIAL INFORMATION    Dear Dr. Shira Barksdale ,    We are seeing Lizbeth Hutchison, date of birth 1940, in the clinic at [facility]. Nusrat Cruz MD is the patient's PCP. Lizbeth Hutchison has an outstanding lab/procedure at the time we reviewed her chart. In order to help keep her health information updated, she has authorized us to request the following medical record(s):        (  )  MAMMOGRAM                                      (  )  COLONOSCOPY      (  )  PAP SMEAR                                          (  )  OUTSIDE LAB RESULTS     (  )  DEXA SCAN                                          ( x ) EYE EXAM(diabetic) [id number]            (  )  FOOT EXAM                                          (  )  ENTIRE RECORD     (  )  OUTSIDE IMMUNIZATIONS                 (  )  _______________         Please fax records to [facility], [place], Laura A., MD,  [phone].     If you have any questions, please contact Cindy Oscar LPN [facility] at   [phone].     Patient Name: Lizbeth Hutchison  : 1940  Patient Phone #: [phone]

## 2025-06-26 ENCOUNTER — TELEPHONE (OUTPATIENT)
Dept: FAMILY MEDICINE | Facility: CLINIC | Age: 85
End: 2025-06-26
Payer: MEDICARE

## 2025-06-26 DIAGNOSIS — I12.9 BENIGN HYPERTENSION WITH CHRONIC KIDNEY DISEASE, STAGE IV: ICD-10-CM

## 2025-06-26 DIAGNOSIS — E78.5 HYPERLIPIDEMIA LDL GOAL <100: ICD-10-CM

## 2025-06-26 DIAGNOSIS — E11.311 TYPE 2 DIABETES MELLITUS WITH RETINOPATHY AND MACULAR EDEMA, WITHOUT LONG-TERM CURRENT USE OF INSULIN, UNSPECIFIED LATERALITY, UNSPECIFIED RETINOPATHY SEVERITY: Primary | ICD-10-CM

## 2025-06-26 DIAGNOSIS — N18.4 BENIGN HYPERTENSION WITH CHRONIC KIDNEY DISEASE, STAGE IV: ICD-10-CM

## 2025-07-03 ENCOUNTER — OFFICE VISIT (OUTPATIENT)
Dept: URGENT CARE | Facility: CLINIC | Age: 85
End: 2025-07-03
Payer: MEDICARE

## 2025-07-03 VITALS
WEIGHT: 140 LBS | DIASTOLIC BLOOD PRESSURE: 71 MMHG | RESPIRATION RATE: 18 BRPM | HEART RATE: 64 BPM | BODY MASS INDEX: 24.8 KG/M2 | SYSTOLIC BLOOD PRESSURE: 182 MMHG | TEMPERATURE: 98 F | OXYGEN SATURATION: 98 % | HEIGHT: 63 IN

## 2025-07-03 DIAGNOSIS — M10.9 ACUTE GOUT OF LEFT KNEE, UNSPECIFIED CAUSE: Primary | ICD-10-CM

## 2025-07-03 DIAGNOSIS — I10 ELEVATED BLOOD PRESSURE READING IN OFFICE WITH DIAGNOSIS OF HYPERTENSION: ICD-10-CM

## 2025-07-03 RX ORDER — DICLOFENAC SODIUM 10 MG/G
2 GEL TOPICAL DAILY
Qty: 150 G | Refills: 0 | Status: SHIPPED | OUTPATIENT
Start: 2025-07-03

## 2025-07-03 RX ORDER — DEXAMETHASONE SODIUM PHOSPHATE 10 MG/ML
5 INJECTION INTRAMUSCULAR; INTRAVENOUS
Status: COMPLETED | OUTPATIENT
Start: 2025-07-03 | End: 2025-07-03

## 2025-07-03 RX ORDER — PREDNISONE 10 MG/1
10 TABLET ORAL DAILY
Qty: 5 TABLET | Refills: 0 | Status: SHIPPED | OUTPATIENT
Start: 2025-07-03 | End: 2025-07-08

## 2025-07-03 RX ADMIN — DEXAMETHASONE SODIUM PHOSPHATE 5 MG: 10 INJECTION INTRAMUSCULAR; INTRAVENOUS at 08:07

## 2025-07-03 NOTE — PATIENT INSTRUCTIONS
You received Decadron steroid injection in clinic today  Tomorrow you can start Prednisone 10mg daily for 5 days    Follow up with your PCP for more gout management     RICE - Rest, ice, compression and elevation to the affected joint or limb as needed.  Rest your joint. Prop it on pillows, keeping it above the level of your heart. This may help lessen pain and swelling.  Ice may help with your pain. Place an ice pack or a bag of frozen vegetables wrapped in a towel over the painful part. Never put ice right on the skin. Do not leave the ice on more than 10 to 15 minutes at a time.  Eat a healthy diet that includes plenty of fruits, vegetables, whole grain, and low-fat dairy products.  Drink plenty of water. Limit sugary drinks and alcohol as they can make your gout flare worse.  Avoid high-purine foods such as:  Red meats like steak and hamburgers  Organ meat like kidney, liver, or brains  Wild game like rabbit, venison, quail, pheasant, and goose  Parra, anchovies, sardines, or caviar  Seafood and shellfish like shrimp, lobsters, mackerel, sardines, mussels, tuna, trout, codfish, indigo, herring, or scallops  Beer, wine, and mixed drinks (alcohol)  Dried beans and peas  Some vegetables, such as asparagus, mushrooms, spinach, and cauliflower  Gravy  Avoid high-fructose foods such as:  Sweetened drinks and juices  Foods with added fructose corn syrup like sodas, enriched fruit drinks, cereals, ice creams, and candy      Please remember that you have received care at an urgent care today. Urgent cares are not emergency rooms and are not equipped to handle life threatening emergencies and cannot rule in or out certain medical conditions and you may be released before all of your medical problems are known or treated. Please arrange follow up with your primary care physician or speciality clinic   (orthopedics) within 2-5 days if your signs and symptoms have not resolved or worsen. Patient can call our Referral  Hotline at [phone] to make an appointment.    Please return here or go to the Emergency Department for any concerns or worsening of condition.Patient was educated on signs/symptoms that would warrant emergent medical attention. Patient verbalized understanding.  You have a fever of 102.2°F (39°C) or higher, or chills with severe joint pain that does not get better with treatment.  You have a fever of 100.4°F (38°C) or higher or chills.  You have pain with passing urine.  Your symptoms are not improving within 2 to 3 days or your pain does not go away completely after a few weeks

## 2025-07-03 NOTE — PROGRESS NOTES
"Subjective:      Patient ID: Lizbeth Hutchison is a 85 y.o. female.    Vitals:  height is 5' 3" (1.6 m) and weight is 63.5 kg (140 lb). Her oral temperature is 97.8 °F (36.6 °C). Her blood pressure is 182/71 (abnormal) and her pulse is 64. Her respiration is 18 and oxygen saturation is 98%.     Chief Complaint: Gout    Pt is an 85 year old female with hx of CKD, DM, HTN with complaints of left knee x2 days. She believes her gout flared up due to eating high fructose sugar.     Knee Pain   The incident occurred 2 days ago. The incident occurred at home. There was no injury mechanism. The pain is present in the left knee. The quality of the pain is described as aching. The pain is at a severity of 10/10. The pain is severe. The pain has been Constant since onset. The symptoms are aggravated by movement and weight bearing. She has tried ice and acetaminophen for the symptoms. The treatment provided no relief.       Musculoskeletal:  Positive for gout.   Skin:  Negative for erythema.      Objective:     Physical Exam   Constitutional: She is oriented to person, place, and time. She appears well-developed.   HENT:   Head: Normocephalic and atraumatic. Head is without abrasion, without contusion and without laceration.   Ears:   Right Ear: External ear normal.   Left Ear: External ear normal.   Nose: Nose normal.   Mouth/Throat: Oropharynx is clear and moist and mucous membranes are normal.   Eyes: Conjunctivae, EOM and lids are normal. Pupils are equal, round, and reactive to light.   Neck: Trachea normal and phonation normal. Neck supple.   Cardiovascular: Normal rate, regular rhythm and normal heart sounds.   Pulmonary/Chest: Effort normal and breath sounds normal. No stridor. No respiratory distress.   Musculoskeletal: Normal range of motion.         General: Normal range of motion.      Left knee: She exhibits swelling and erythema. She exhibits normal range of motion, no effusion, no ecchymosis, no deformity and " no laceration. Tenderness (very tender to touch) found.        Legs:    Neurological: She is alert and oriented to person, place, and time.   Skin: Skin is warm, dry, intact and no rash. Capillary refill takes less than 2 seconds. not left kneeNo abrasion, No burn, No bruising, No erythema and No ecchymosis   Psychiatric: Her speech is normal and behavior is normal. Judgment and thought content normal.   Nursing note and vitals reviewed.      Assessment:     1. Acute gout of left knee, unspecified cause    2. Elevated blood pressure reading in office with diagnosis of hypertension        Plan:   Will treat her gout flare up with decadron 5mg IM in clinic today and have her start prednisone tomorrow. She is unable to take NSAIDs due to CKD. BP elevated today. She states it is usually elevated when she is in pain. Encouraged to continue taking BP medications. She has follow up appt with PCP in 2 weeks.    Acute gout of left knee, unspecified cause  -     dexAMETHasone injection 5 mg  -     predniSONE (DELTASONE) 10 MG tablet; Take 1 tablet (10 mg total) by mouth once daily. for 5 days  Dispense: 5 tablet; Refill: 0  -     diclofenac sodium (VOLTAREN ARTHRITIS PAIN) 1 % Gel; Apply 2 g topically once daily.  Dispense: 150 g; Refill: 0    Elevated blood pressure reading in office with diagnosis of hypertension             Patient Instructions   You received Decadron steroid injection in clinic today  Tomorrow you can start Prednisone 10mg daily for 5 days    Follow up with your PCP for more gout management     RICE - Rest, ice, compression and elevation to the affected joint or limb as needed.  Rest your joint. Prop it on pillows, keeping it above the level of your heart. This may help lessen pain and swelling.  Ice may help with your pain. Place an ice pack or a bag of frozen vegetables wrapped in a towel over the painful part. Never put ice right on the skin. Do not leave the ice on more than 10 to 15 minutes at a  time.  Eat a healthy diet that includes plenty of fruits, vegetables, whole grain, and low-fat dairy products.  Drink plenty of water. Limit sugary drinks and alcohol as they can make your gout flare worse.  Avoid high-purine foods such as:  Red meats like steak and hamburgers  Organ meat like kidney, liver, or brains  Wild game like rabbit, venison, quail, pheasant, and goose  Parra, anchovies, sardines, or caviar  Seafood and shellfish like shrimp, lobsters, mackerel, sardines, mussels, tuna, trout, codfish, indigo, herring, or scallops  Beer, wine, and mixed drinks (alcohol)  Dried beans and peas  Some vegetables, such as asparagus, mushrooms, spinach, and cauliflower  Gravy  Avoid high-fructose foods such as:  Sweetened drinks and juices  Foods with added fructose corn syrup like sodas, enriched fruit drinks, cereals, ice creams, and candy      Please remember that you have received care at an urgent care today. Urgent cares are not emergency rooms and are not equipped to handle life threatening emergencies and cannot rule in or out certain medical conditions and you may be released before all of your medical problems are known or treated. Please arrange follow up with your primary care physician or speciality clinic   (orthopedics) within 2-5 days if your signs and symptoms have not resolved or worsen. Patient can call our Referral Hotline at [phone] to make an appointment.    Please return here or go to the Emergency Department for any concerns or worsening of condition.Patient was educated on signs/symptoms that would warrant emergent medical attention. Patient verbalized understanding.  You have a fever of 102.2°F (39°C) or higher, or chills with severe joint pain that does not get better with treatment.  You have a fever of 100.4°F (38°C) or higher or chills.  You have pain with passing urine.  Your symptoms are not improving within 2 to 3 days or your pain does not go away completely after a few  weeks

## 2025-07-16 ENCOUNTER — OFFICE VISIT (OUTPATIENT)
Dept: FAMILY MEDICINE | Facility: CLINIC | Age: 85
End: 2025-07-16
Payer: MEDICARE

## 2025-07-16 VITALS
WEIGHT: 135.81 LBS | HEART RATE: 61 BPM | BODY MASS INDEX: 24.06 KG/M2 | DIASTOLIC BLOOD PRESSURE: 54 MMHG | OXYGEN SATURATION: 94 % | HEIGHT: 63 IN | SYSTOLIC BLOOD PRESSURE: 118 MMHG | TEMPERATURE: 98 F

## 2025-07-16 DIAGNOSIS — Z23 NEED FOR COVID-19 VACCINE: ICD-10-CM

## 2025-07-16 DIAGNOSIS — I70.0 ATHEROSCLEROSIS OF ABDOMINAL AORTA: ICD-10-CM

## 2025-07-16 DIAGNOSIS — M25.562 LEFT KNEE PAIN, UNSPECIFIED CHRONICITY: ICD-10-CM

## 2025-07-16 DIAGNOSIS — Z29.11 NEED FOR PROPHYLACTIC VACCINATION AND INOCULATION AGAINST RESPIRATORY SYNCYTIAL VIRUS (RSV): ICD-10-CM

## 2025-07-16 DIAGNOSIS — E78.5 HYPERLIPIDEMIA LDL GOAL <100: ICD-10-CM

## 2025-07-16 DIAGNOSIS — I12.9 BENIGN HYPERTENSION WITH CHRONIC KIDNEY DISEASE, STAGE IV: ICD-10-CM

## 2025-07-16 DIAGNOSIS — E55.9 VITAMIN D DEFICIENCY DISEASE: ICD-10-CM

## 2025-07-16 DIAGNOSIS — M85.80 OSTEOPENIA AFTER MENOPAUSE: ICD-10-CM

## 2025-07-16 DIAGNOSIS — Z78.0 OSTEOPENIA AFTER MENOPAUSE: ICD-10-CM

## 2025-07-16 DIAGNOSIS — R05.9 COUGH, UNSPECIFIED TYPE: ICD-10-CM

## 2025-07-16 DIAGNOSIS — N25.81 SECONDARY HYPERPARATHYROIDISM OF RENAL ORIGIN: ICD-10-CM

## 2025-07-16 DIAGNOSIS — Z23 NEED FOR TDAP VACCINATION: ICD-10-CM

## 2025-07-16 DIAGNOSIS — D56.3 ALPHA THALASSEMIA TRAIT: ICD-10-CM

## 2025-07-16 DIAGNOSIS — T46.6X5A STATIN MYOPATHY: ICD-10-CM

## 2025-07-16 DIAGNOSIS — N18.4 BENIGN HYPERTENSION WITH CHRONIC KIDNEY DISEASE, STAGE IV: ICD-10-CM

## 2025-07-16 DIAGNOSIS — Z23 NEED FOR SHINGLES VACCINE: ICD-10-CM

## 2025-07-16 DIAGNOSIS — G72.0 STATIN MYOPATHY: ICD-10-CM

## 2025-07-16 DIAGNOSIS — E11.311 TYPE 2 DIABETES MELLITUS WITH RETINOPATHY AND MACULAR EDEMA, WITHOUT LONG-TERM CURRENT USE OF INSULIN, UNSPECIFIED LATERALITY, UNSPECIFIED RETINOPATHY SEVERITY: Primary | ICD-10-CM

## 2025-07-16 PROCEDURE — 99215 OFFICE O/P EST HI 40 MIN: CPT | Mod: PBBFAC,PO | Performed by: INTERNAL MEDICINE

## 2025-07-16 PROCEDURE — 99214 OFFICE O/P EST MOD 30 MIN: CPT | Mod: S$PBB,,, | Performed by: INTERNAL MEDICINE

## 2025-07-16 PROCEDURE — G2211 COMPLEX E/M VISIT ADD ON: HCPCS | Mod: ,,, | Performed by: INTERNAL MEDICINE

## 2025-07-16 PROCEDURE — 99999 PR PBB SHADOW E&M-EST. PATIENT-LVL V: CPT | Mod: PBBFAC,,, | Performed by: INTERNAL MEDICINE

## 2025-07-16 NOTE — PATIENT INSTRUCTIONS
For allergy symptoms I recommend:   antihistamine at bedtime (allegra, claritin or zyrtec - without decongestant (D))   saline nasal rinse daily (preferably in the evening before bedtime) (hold head forward and spray towards the corresponding ear; then blow your nose)  Flonase once daily after saline rinse    Mucinex DM (mucinex thins secretions; DM suppresses cough)

## 2025-07-16 NOTE — PROGRESS NOTES
CHIEF COMPLAINT:   Chief Complaint   Patient presents with    Diabetes     6 month           HISTORY OF PRESENT ILLNESS:  Lizbeth Hutchison is a 85 y.o. female who presents to the clinic today for Diabetes (6 month )  .             Patient reports having had acute naso-laryngitis (a cold) that started on May 12th. After evaluation at urgent care, she still has a persistent dry, hacking cough with difficulty expectorating. Cough drops have not been effective in stopping the cough.    Patient also reports recent gout attacks. The first attack affected the right foot, for which she was evaluated at urgent care and received a half shot of prednisone and pills. A subsequent gout attack occurred on July 3rd, affecting the left knee, which she had never had before. Prednisone and diclofenac were prescribed but ineffective. Maximum strength Tylenol (arthritis strength) provided better relief than the prescribed medications. The knee pain is severe, radiating up and down the leg, with extreme discomfort due to the intensity. Currently, the knee is still painful, particularly when bent, and was previously swollen and red. She is using a walker to assist with mobility.    Patient mentions having kidney issues and was advised to ask about colchicine use due to potential kidney effects. She reports drinking large amounts of water, necessitating frequent urination every 15-20 minutes.    Patient tried taking allopurinol for gout prevention but reports that it caused significant GI distress. She also mentions having two prescriptions of colchicine but is unsure about using it due to potential kidney effects.    Patient denies having any nasal congestion.          Subjective    PAST MEDICAL HISTORY:  Past Medical History:   Diagnosis Date    Alpha thalassemia trait     Anxiety     Atherosclerosis of abdominal aorta     noted on CT scan 4/6/2016    DDD (degenerative disc disease), lumbar     chronic low back pain     Diverticulosis     History of colonic polyps     last colonoscopy  - normal    Hyperlipidemia LDL goal <100     unable to tolerate statins or Welchol    Hypertension     Osteopenia     no need for medication at this time - last BMD 2010    Overweight     Type II or unspecified type diabetes mellitus without mention of complication, not stated as uncontrolled     Vitamin D deficiency disease     resolved with supplementation       PAST SURGICAL HISTORY:  Past Surgical History:   Procedure Laterality Date    CATARACT EXTRACTION Bilateral 19    samuel      bilateral    HEMORRHOID SURGERY      KELOID EXCISION  ,     abdominal wall    TOTAL ABDOMINAL HYSTERECTOMY      TRIGGER FINGER RELEASE      left hand       SOCIAL HISTORY:  Social History[1]    FAMILY HISTORY:  Family History   Adopted: Yes   Problem Relation Name Age of Onset    Heart failure Mother      Alzheimer's disease Mother      Other Sister Jocelyne          from too much anesthesia    Arthritis Sister Sally         back problems    Hypertension Sister Sally     Transient ischemic attack Sister Sally     Congenital heart disease Brother Mark     Stroke Brother Vieyra     Coronary artery disease Brother Bonifacio         s/p stenting    Stroke Brother Bonifacio     Breast cancer Maternal Grandmother      Other Son          had eosinophilic granulomatosis -  shortly after lung transplant    Colon cancer Other grandson - Dmitriy     Other Daughter          stiffman syndrome    Diabetes Neg Hx         ALLERGIES AND MEDICATIONS: updated and reviewed.  Review of patient's allergies indicates:   Allergen Reactions    Cholesterol analogues Other (See Comments)     Muscle spasam    Clindamycin Hives    Statins-hmg-coa reductase inhibitors Other (See Comments)    Sulfa (sulfonamide antibiotics)     Tramadol Nausea And Vomiting    Welchol [colesevelam] Other (See Comments)    Codeine Rash    Penicillins Rash      Medication List with Changes/Refills   Current Medications    CALCIUM ORAL    Take 1 tablet by mouth once daily.    COLESTIPOL (COLESTID) 1 GRAM TAB    TAKE 2 TABLETS BY MOUTH 2 TIMES DAILY.    CYANOCOBALAMIN (VITAMIN B-12) 1000 MCG TABLET    Take 100 mcg by mouth once daily.    DICLOFENAC SODIUM (VOLTAREN ARTHRITIS PAIN) 1 % GEL    Apply 2 g topically once daily.    DICLOFENAC SODIUM (VOLTAREN) 1 % GEL    Apply 2 g topically 3 (three) times daily. for 10 days    FLUTICASONE (VERAMYST) 27.5 MCG/ACTUATION NASAL SPRAY    2 sprays by Nasal route once daily.    FLUTICASONE PROPIONATE (FLONASE) 50 MCG/ACTUATION NASAL SPRAY        LISINOPRIL (PRINIVIL,ZESTRIL) 40 MG TABLET    TAKE 1 TABLET BY MOUTH EVERY DAY    MULTIVITAMIN/IRON/FOLIC ACID (CENTRUM COMPLETE ORAL)    Take 1 tablet by mouth once daily.    OMEGA-3 FATTY ACIDS 1,000 MG CAP    Take by mouth. 1 Capsule Oral Every day    PREDNISONE (DELTASONE) 10 MG TABLET    Take 1 tablet (10 mg total) by mouth once daily.    VITAMIN D 185 MG TAB    Take 185 mg by mouth once daily.   Discontinued Medications    HYDROCHLOROTHIAZIDE (HYDRODIURIL) 25 MG TABLET    TAKE 1 TABLET BY MOUTH EVERY DAY    PREDNISONE (DELTASONE) 10 MG TABLET    Take 1 tablet (10 mg total) by mouth once daily. for 5 days       CARE TEAM:  Patient Care Team:  Nusrat Cruz MD as PCP - General (Internal Medicine)  Shira Barksdale OD (Ophthalmology)  Cindy Oscar LPN as Care Coordinator       REVIEW OF SYSTEMS:  Review of Systems   Constitutional:  Negative for chills and fever.   Respiratory:  Positive for cough. Negative for shortness of breath.    Cardiovascular:  Negative for chest pain and leg swelling.   Gastrointestinal:  Negative for abdominal pain.   Genitourinary:  Negative for dysuria.   Musculoskeletal:  Positive for arthralgias and gait problem.              Objective    PHYSICAL EXAMINATION/VITALS:  Vitals:    07/16/25 1349   BP: (!) 118/54   Pulse: 61   Temp: 98.2 °F (36.8  "°C)   TempSrc: Oral   SpO2: (!) 94%   Weight: 61.6 kg (135 lb 12.9 oz)   Height: 5' 3" (1.6 m)       Body mass index is 24.06 kg/m².      General appearance - alert, well appearing, and in no distress, pleasant elderly female, exam limited as patient did not get onto the examination table  Psychiatric - alert, oriented to person, place, and time, normal behavior, speech, dress, motor activity and thought process  Eyes - pupils equal and reactive, extraocular eye movements intact, sclera anicteric  Neck - supple, no significant adenopathy  Lymphatics - no palpable cervical lymphadenopathy  Chest - clear to auscultation, no wheezes, rales or rhonchi, symmetric air entry  Heart - normal rate and regular rhythm  Neurological - alert, normal speech, no focal findings; cranial nerves II through XII intact  Musculoskeletal - patient ambulated with a walker, left knee today without swelling, erythema, or warmth  Extremities - no pedal edema noted  Skin - normal coloration, no suspicious skin lesions        LABS:  Lab Results   Component Value Date    HGBA1C 5.9 (H) 11/18/2024    HGBA1C 6.0 (H) 02/16/2024    HGBA1C 5.9 (H) 08/07/2023      Lab Results   Component Value Date    CHOL 250 (H) 02/16/2024    CHOL 242 (H) 02/01/2023    CHOL 244 (H) 01/28/2022     Lab Results   Component Value Date    LDLCALC 171.6 (H) 02/16/2024    LDLCALC 170.8 (H) 02/01/2023    LDLCALC 170.6 (H) 01/28/2022               ASSESSMENT AND PLAN:  1. 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   Overall diabetes control has stayed the same since last check.  We discussed diabetic diet and regular exercise.   We discussed home blood sugar monitoring, if appropriate - the patient does not need to " test daily but can test only as needed.   We discussed low sugar/low carbohydrate diet and regular exercise to prevent progression. No need for prescription medication at this time.  Diabetic complications addressed: None addressed today.  Patient was counseled on the need for yearly eye exam to screen for/monitor diabetic retinopathy and yearly diabetic foot exam.  Overview:  Diet controlled      2. Benign hypertension with chronic kidney disease, stage IV  BP Readings from Last 1 Encounters:   07/16/25 (!) 118/54      Discussed sodium restriction, maintaining ideal body weight and regular exercise program as physiologic means to achieve blood pressure control. The patient will strive towards this.   The current medical regimen is effective;  continue present plan and medications. Recommended patient to check home readings to monitor and see me for followup as scheduled or sooner as needed.   Patient was educated that both decongestant and anti-inflammatory medication may raise blood pressure.  Stable decreased kidney function. Observe. Patient counseled to avoid/minimize the use of anti-inflammatory  Medication. Discussed to stay well hydrated. Also discussed with patient that good control of blood pressure and/or diabetes, if present, will help to prevent progression.  The patient is not active on the digital hypertension program.  Overview:  - followed by nephrology      3. Hyperlipidemia LDL goal <100/4. Statin myopathy  Lab Results   Component Value Date    CHOL 250 (H) 02/16/2024     Lab Results   Component Value Date    HDL 44 02/16/2024     Lab Results   Component Value Date    LDLCALC 171.6 (H) 02/16/2024     Lab Results   Component Value Date    TRIG 172 (H) 02/16/2024     Lab Results   Component Value Date    LDLCALC 171.6 (H) 02/16/2024     We discussed low fat diet and regular exercise. Patient has statin myopathy.  Overview:  unable to tolerate statins or Welchol        5. Atherosclerosis of abdominal  aorta  Patient with Atherosclerosis of the Aorta.  Stable/asymptomatic. Currently stable on lipid and blood pressure monitoring. Statin intolerant.  Overview:  noted on CT scan 4/6/2016      6. Alpha thalassemia trait  Stable. Asymptomatic. Observe.    7. Secondary hyperparathyroidism of renal origin  Stable. Asymptomatic. Observe.    8. Osteopenia after menopause  We discussed adequate calcium intake (preferably from diet) and vitamin D supplementation. We discussed fall precautions. She is scheduled for her BMD. No need for prescription medication at this time. Further treatment plan will be based on results of bone density testing.  Overview:  no need for medication at this time - last BMD March 2016  BMD 1/2021 - No need for Rx tx at this time.     Orders:  -     DXA Bone Density Axial Skeleton 1 or more sites; Future; Expected date: 07/16/2025    9. Vitamin D deficiency disease  The current medical regimen is effective;  continue present plan and medications.   Overview:  resolved with supplementation      10. Need for COVID-19 vaccine  Declined    11. Need for Tdap vaccination  Patient was advised to get immunization at the pharmacy.    12. Need for shingles vaccine  Patient was advised to get immunization at the pharmacy.    13. Need for prophylactic vaccination and inoculation against respiratory syncytial virus (RSV)  Patient was advised to get immunization at the pharmacy.    14. Left knee pain, unspecified chronicity  Unsure of recent pain was due to arthritis or gout.  I will refer to orthopedics for further evaluation.  She will discuss with Nephrology if she may take colchicine as needed for gout attacks.  She will also retry the allopurinol to lower her overall your acid level.  We also discussed icing, elevation, and non ambulation when she has a gout attack.  -     Ambulatory referral/consult to Orthopedics; Future; Expected date: 07/23/2025    15. Cough, unspecified type  On exam today her lungs  were clear.  Her nasal exam did reveal some turbinate enlargement/congestion.  We discussed taking antihistamine at bedtime and using saline nasal rinse once or twice daily.  She may add Flonase nasal spray as needed.  She will let me know if symptoms worsen or persist.  We also discussed using Mucinex DM as needed to help thin secretions that suppress cough.  I do not see any need for antibiotics at this time.  We also discussed trying some cough suppression to decrease throat irritation which may lead to further coughing.                PATIENT EDUCATION:  Discussed uric acid metabolism and its role in gout attacks, particularly in context of impaired renal function.  Provided information on handicap placard options and renewal process - temporary tag paperwork completed today..    ACTION ITEMS/LIFESTYLE:  Patient to i continue with copious water intake to manage gout symptoms.  Patient to use saline nasal rinse to clear pollen and reduce congestion.  Patient to try to suppress cough when possible to allow cilia regrowth.  Patient to research foods that are safe to eat for patients with gout, not just focus on foods that she can not eat.          Orders Placed This Encounter   Procedures    DXA Bone Density Axial Skeleton 1 or more sites    Ambulatory referral/consult to Orthopedics      FOLLOW UP: Follow up in about 6 months (around 1/16/2026), or if symptoms worsen or fail to improve, for annual exam. or sooner as needed.    This note was generated with the assistance of ambient listening technology. Verbal consent was obtained by the patient and accompanying visitor(s) for the recording of patient appointment to facilitate this note. I attest to having reviewed and edited the generated note for accuracy, though some syntax or spelling errors may persist. Please contact the author of this note for any clarification.         [1]   Social History  Socioeconomic History    Marital status:     Number of  children: 2   Occupational History    Occupation:    Tobacco Use    Smoking status: Never    Smokeless tobacco: Never   Substance and Sexual Activity    Alcohol use: No    Drug use: Never     Social Drivers of Health     Financial Resource Strain: Low Risk  (3/20/2024)    Overall Financial Resource Strain (CARDIA)     Difficulty of Paying Living Expenses: Not hard at all   Food Insecurity: No Food Insecurity (3/20/2024)    Hunger Vital Sign     Worried About Running Out of Food in the Last Year: Never true     Ran Out of Food in the Last Year: Never true   Transportation Needs: No Transportation Needs (3/20/2024)    PRAPARE - Transportation     Lack of Transportation (Medical): No     Lack of Transportation (Non-Medical): No   Physical Activity: Unknown (3/20/2024)    Exercise Vital Sign     Days of Exercise per Week: 3 days   Stress: No Stress Concern Present (3/20/2024)    [employer] [place] [employer] - Occupational Stress Questionnaire     Feeling of Stress : Only a little   Housing Stability: Low Risk  (3/20/2024)    Housing Stability Vital Sign     Unable to Pay for Housing in the Last Year: No     Number of Places Lived in the Last Year: 1     Unstable Housing in the Last Year: No

## 2025-07-17 ENCOUNTER — OFFICE VISIT (OUTPATIENT)
Dept: ORTHOPEDICS | Facility: CLINIC | Age: 85
End: 2025-07-17
Attending: ORTHOPAEDIC SURGERY
Payer: MEDICARE

## 2025-07-17 ENCOUNTER — APPOINTMENT (OUTPATIENT)
Dept: RADIOLOGY | Facility: HOSPITAL | Age: 85
End: 2025-07-17
Attending: ORTHOPAEDIC SURGERY
Payer: MEDICARE

## 2025-07-17 VITALS — WEIGHT: 135.81 LBS | HEIGHT: 63 IN | BODY MASS INDEX: 24.06 KG/M2

## 2025-07-17 DIAGNOSIS — M17.12 PRIMARY OSTEOARTHRITIS OF LEFT KNEE: Primary | ICD-10-CM

## 2025-07-17 DIAGNOSIS — M25.562 LEFT KNEE PAIN, UNSPECIFIED CHRONICITY: Primary | ICD-10-CM

## 2025-07-17 DIAGNOSIS — M1A.0620 CHRONIC GOUT OF LEFT KNEE, UNSPECIFIED CAUSE: ICD-10-CM

## 2025-07-17 DIAGNOSIS — M25.562 LEFT KNEE PAIN, UNSPECIFIED CHRONICITY: ICD-10-CM

## 2025-07-17 PROCEDURE — 99999PBSHW PR PBB SHADOW TECHNICAL ONLY FILED TO HB: Mod: PBBFAC,,,

## 2025-07-17 PROCEDURE — 99999 PR PBB SHADOW E&M-EST. PATIENT-LVL III: CPT | Mod: PBBFAC,,, | Performed by: ORTHOPAEDIC SURGERY

## 2025-07-17 PROCEDURE — 73564 X-RAY EXAM KNEE 4 OR MORE: CPT | Mod: TC,PN,LT

## 2025-07-17 PROCEDURE — 99213 OFFICE O/P EST LOW 20 MIN: CPT | Mod: PBBFAC,25,PN | Performed by: ORTHOPAEDIC SURGERY

## 2025-07-17 PROCEDURE — 73564 X-RAY EXAM KNEE 4 OR MORE: CPT | Mod: 26,LT,, | Performed by: RADIOLOGY

## 2025-07-17 PROCEDURE — 20610 DRAIN/INJ JOINT/BURSA W/O US: CPT | Mod: PBBFAC,PN,LT | Performed by: ORTHOPAEDIC SURGERY

## 2025-07-17 RX ORDER — TRIAMCINOLONE ACETONIDE 40 MG/ML
40 INJECTION, SUSPENSION INTRA-ARTICULAR; INTRAMUSCULAR
Status: DISCONTINUED | OUTPATIENT
Start: 2025-07-17 | End: 2025-07-17 | Stop reason: HOSPADM

## 2025-07-17 RX ADMIN — TRIAMCINOLONE ACETONIDE 40 MG: 40 INJECTION, SUSPENSION INTRA-ARTICULAR; INTRAMUSCULAR at 02:07

## 2025-07-17 NOTE — PROCEDURES
Large Joint Aspiration/Injection: L knee    Date/Time: 7/17/2025 2:40 PM    Performed by: Chucho Rasheed MD  Authorized by: Chucho Rasheed MD    Consent Done?:  Yes (Verbal)  Indications:  Pain  Prep: patient was prepped and draped in usual sterile fashion      Local anesthesia used?: Yes    Anesthesia:  Local infiltration  Local anesthetic:  Topical anesthetic and lidocaine 1% without epinephrine    Details:  Needle Size:  22 G  Approach:  Anterolateral  Location:  Knee  Site:  L knee  Medications:  40 mg triamcinolone acetonide 40 mg/mL  Patient tolerance:  Patient tolerated the procedure well with no immediate complications

## 2025-07-17 NOTE — PROGRESS NOTES
NEW PATIENT ORTHOPAEDIC: Knee    PRIMARY CARE PHYSICIAN: Nusrat Cruz MD   REFERRING PROVIDER: Nusrat Cruz MD  [id number] [facility]  [place] [id number]     ASSESSMENT & PLAN:    Impression:  Left Knee Mild Osteoarthritis, Primary    Left Knee gout    Follow Up Plan: PRN     Injection:     Lizbeth Hutchison has physical exam evidence of above and wishes to pursue an injection. We discussed alternative non operative modalities including physical therapy, anti-inflammatories, bracing, maintenance of appropriate weight, rest, ambulatory devices. We discussed the risk, benefits, and expectations regarding injection. They have elected to proceed with injection. See procedure note for billing purposes.     Non operative care:    Lizbeth Hutchison has physical exam evidence of above and wishes to pursue an non-operative care. I am recommending the following: injection      Patient comes in with a acute on chronic left knee pain.  These pains tend to come in bouts.  She has had intermittent pain and July, June, a few months before then.  She has been having pain in her ankle and in her toes.  She associates this with elevated uric acid levels and gout.  She has been unable to tolerate anti-inflammatories secondary to underlying kidney disease.  She has had trouble with preventative medications for these flare-ups.  With varying side effects.  She was previously told she could not tolerate steroid injections as a result of her underlying kidney disease.  I have no association of intra-articular knee injections with causing problems aside from diabetic complications and not anything to do with her kidneys.  As a result of her intolerance to oral medications I think she would benefit from intra-articular steroid injection.  She is agreeable to this today.  She has had IM injections without significant benefit.  I am optimistic that this will help return her to baseline.  Preventatively for her gout attacks she  is looking at discontinuing some medications.  She pays attention to her diet.  She has x-rays that demonstrate some age-appropriate wear and tear but I do not think this acute pain attacks are related to the underlying arthritis.  Her swelling seems reasonable today but she reports previously was more significant.  She had episodes where light touch would cause significant pain to her knee which I think we would be more consistent with gout.    The patient has been ordered:  Office Intraarticular injection    CONSULTS:   None    ACTIVE PROBLEM LIST  Problem List[1]        SUBJECTIVE    CHIEF COMPLAINT: Knee Pain    HPI:   Lizbeth Hutchison is a 85 y.o. female here for evaluation and management of left knee pain. There is not a specific incident that brought about this pain. she has had progressive problems with the knee(s) starting 1 months ago but is now progressing to interfere with activities which include: walking, functional household ADL's, and participating in family activities    Currently the pain in the joint is rated at moderate with activity. The pain is intermittent and is located in the knee, located medially, located anterior, located posterior, and without radiation. The pain is described as aching, severe, sharp, stiffness, and throbbing. Relieving factors include ambulatory device, rest, prescription medication, over the counter medication , and repositioning.     There is not associated Catching, Clicking, and Popping.     Lizbeth Hutchison has no additional complaints.     PROGRESSIVE SYMPTOMS:  Pain worsened by weight bearing  Pain effecting living situation    FUNCTIONAL STATUS:   Walk a block or two on level ground     PREVIOUS TREATMENTS:  Medical: Intolerant of NSAIDS  Physical Therapy: Use of Ambulatory Aid and Activities Modified   Previous Orthopaedic Surgery: None    REVIEW OF SYSTEMS:  PAIN ASSESSMENT:  See HPI.  MUSCULOSKELETAL: See HPI.  OTHER 10 point review of systems is  negative except as stated in HPI above    PAST MEDICAL HISTORY   has a past medical history of Alpha thalassemia trait, Anxiety, Atherosclerosis of abdominal aorta, DDD (degenerative disc disease), lumbar, Diverticulosis, History of colonic polyps, Hyperlipidemia LDL goal <100, Hypertension, Osteopenia, Overweight, Type II or unspecified type diabetes mellitus without mention of complication, not stated as uncontrolled, and Vitamin D deficiency disease.     PAST SURGICAL HISTORY   has a past surgical history that includes Hemorrhoid surgery (1970); Total abdominal hysterectomy (1972); Keloid excision (1974, 1976); hammertoe (1994); Trigger finger release (2003); and Cataract extraction (Bilateral, 6/25/19 8/23/19).     FAMILY HISTORY  family history includes Alzheimer's disease in her mother; Arthritis in her sister; Breast cancer in her maternal grandmother; Colon cancer in an other family member; Congenital heart disease in her brother; Coronary artery disease in her brother; Heart failure in her mother; Hypertension in her sister; Other in her daughter, sister, and son; Stroke in her brother and brother; Transient ischemic attack in her sister. She was adopted.     SOCIAL HISTORY   reports that she has never smoked. She has never used smokeless tobacco. She reports that she does not drink alcohol and does not use drugs.     ALLERGIES   Review of patient's allergies indicates:   Allergen Reactions    Cholesterol analogues Other (See Comments)     Muscle spasam    Clindamycin Hives    Statins-hmg-coa reductase inhibitors Other (See Comments)    Sulfa (sulfonamide antibiotics)     Tramadol Nausea And Vomiting    Welchol [colesevelam] Other (See Comments)    Codeine Rash    Penicillins Rash        MEDICATIONS  Medications Ordered Prior to Encounter[2]       PHYSICAL EXAM   vitals were not taken for this visit.   There is no height or weight on file to calculate BMI.      All other systems deferred.  GENERAL:  No  acute distress  HABITUS: Normal  GAIT: Antalgic  SKIN: Normal  and No erythema, warmth, fluctuance     KNEE EXAM:    left:   Effusion: Minimal joint effusion  TTP: Yes over Medial Joint Line and Lateral Joint Line   No crepitus with passive knee ROM  Passive ROM: Extension 0, Flexion 110 (limited by pain in flexion)  No pain with manipulation of patella  Stable to varus/valgus stress. No increased laxity to anterior/posterior drawer testing  negative Tiffany's test  No pain with IR/ER rotation of the hip  5/5 strength in knee flexion and extension, ankle plantarflexion and dorsiflexion  Neurovascular Status: Sensation intact to light touch in Sural, Saphenous, SPN, DPN, Tibial nerve distribution  2+ pulse DP/PT, normal capillary refill, foot has normal warmth    DATA:  Diagnostic tests reviewed for today's visit:     4v of the knee reveal Mild degenerative changes of the Medial and Patellofemoral compartment. There is evidence of advanced osteoarthritis changes with Joint space narrowing. The limb is in netural alignment. The patella is tracking midline.       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      colestipoL (COLESTID) 1 gram Tab TAKE 2 TABLETS BY MOUTH 2 TIMES DAILY. 360 tablet 0    cyanocobalamin (VITAMIN B-12) 1000 MCG tablet Take 100 mcg by mouth once daily.      diclofenac sodium (VOLTAREN ARTHRITIS PAIN) 1 % Gel  Apply 2 g topically once daily. 150 g 0    fluticasone (VERAMYST) 27.5 mcg/actuation nasal spray 2 sprays by Nasal route once daily.      fluticasone propionate (FLONASE) 50 mcg/actuation nasal spray       lisinopriL (PRINIVIL,ZESTRIL) 40 MG tablet TAKE 1 TABLET BY MOUTH EVERY DAY 90 tablet 1    MULTIVITAMIN/IRON/FOLIC ACID (CENTRUM COMPLETE ORAL) Take 1 tablet by mouth once daily.      omega-3 fatty acids 1,000 mg Cap Take by mouth. 1 Capsule Oral Every day      predniSONE (DELTASONE) 10 MG tablet Take 1 tablet (10 mg total) by mouth once daily. (Patient not taking: Reported on 7/16/2025) 5 tablet 3    vitamin D 185 MG Tab Take 185 mg by mouth once daily.       No current facility-administered medications on file prior to visit.

## 2025-07-22 ENCOUNTER — HOSPITAL ENCOUNTER (OUTPATIENT)
Dept: RADIOLOGY | Facility: HOSPITAL | Age: 85
Discharge: HOME | End: 2025-07-22
Attending: STUDENT IN AN ORGANIZED HEALTH CARE EDUCATION/TRAINING PROGRAM
Payer: MEDICARE

## 2025-07-22 DIAGNOSIS — N18.32 STAGE 3B CHRONIC KIDNEY DISEASE: ICD-10-CM

## 2025-07-22 PROCEDURE — 76770 US EXAM ABDO BACK WALL COMP: CPT | Mod: 26,,, | Performed by: STUDENT IN AN ORGANIZED HEALTH CARE EDUCATION/TRAINING PROGRAM

## 2025-07-22 PROCEDURE — 76770 US EXAM ABDO BACK WALL COMP: CPT | Mod: TC

## 2025-07-28 NOTE — PROGRESS NOTES
Subjective     Chief Complaint: CKD    History of Present Illness:  Mrs. Lizbeth Hutchison is a 85 y.o. female with active medical diagnosis of alpha thalassemia trait, anxiety, HTN, T2Dm, gout    Interval history: Has had 3 gout attacks in the last 3 months. HCTZ had stopped. Has been referred to ortho, received intra-articular steroids.     Review of Systems   Constitutional:  Negative for chills and fever.   HENT:  Negative for ear discharge and ear pain.    Eyes:  Negative for blurred vision and double vision.   Respiratory:  Negative for cough and shortness of breath.    Cardiovascular:  Negative for chest pain and palpitations.   Gastrointestinal:  Negative for abdominal pain, constipation, diarrhea, nausea and vomiting.   Genitourinary:  Negative for dysuria and frequency.   Musculoskeletal:  Positive for joint pain (knee soreness). Negative for myalgias and neck pain.   Skin:  Negative for itching and rash.   Neurological:  Negative for tingling and headaches.         ASSESSMENT & PLAN:     There are no diagnoses linked to this encounter.    #CKD3b/4: age related changes, HTN, gout  Baseline creatinine 1.5-1.7  UA   Lab Results   Component Value Date    PHUR 6.0 07/17/2025    SPECGRAV 1.015 07/17/2025    PROTEINUA Negative 07/17/2025    GLUCUA Negative 07/17/2025    KETONESUA Negative 07/17/2025    OCCULTUA Negative 07/17/2025    NITRITE Negative 07/17/2025    LEUKOCYTESUR 1+ (A) 07/17/2025    RBCUA <1 07/17/2025    WBCUA 2 07/17/2025    BACTERIA Occasional 07/17/2025        UPCR 0    Creatinine   Date Value Ref Range Status   07/22/2025 1.7 (H) 0.5 - 1.4 mg/dL Final   01/17/2025 1.5 (H) 0.5 - 1.4 mg/dL Final   12/10/2024 1.6 (H) 0.5 - 1.4 mg/dL Final   02/16/2024 1.7 (H) 0.5 - 1.4 mg/dL Final     eGFR   Date Value Ref Range Status   07/22/2025 29 (L) >60 mL/min/1.73/m2 Final     Comment:     Estimated GFR calculated using the CKD-EPI creatinine (2021) equation.   01/17/2025 34.2 (A) >60 mL/min/1.73  m^2 Final   12/10/2024 31.6 (A) >60 mL/min/1.73 m^2 Final   02/16/2024 29.6 (A) >60 mL/min/1.73 m^2 Final     Urine Protein/Creatinine Ratio   Date Value Ref Range Status   07/17/2025   Final     Comment:     UNABLE TO CALCULATE     Prot/Creat Ratio, Urine   Date Value Ref Range Status   01/18/2025 Unable to calculate 0.00 - 0.20 Final   12/10/2024 Unable to calculate 0.00 - 0.20 Final       Imaging:  RP US 1/2/2025 - right kidney 9.5 with left renal parenchymal calcification. Left kidney 9.2 cm.     Natural course of CKD discussed - wishes to live as long as possible however will discuss RRT in the future if CKD progresses again.   Has not attended CKD education    Not a transplant candidate due to age and other comorbidites    #DM  Hemoglobin A1c:  Lab Results   Component Value Date    HGBA1C 6.0 (H) 07/22/2025    HGBA1C 5.9 (H) 11/18/2024    HGBA1C 6.0 (H) 02/16/2024     #HTN  Home medications: lisinopril 40  120/60s at home.     Change to losartan to assist with uricosuresis.    #Gout  Uric Acid (mg/dL)   Date Value   01/17/2025 8.9 (H)   12/10/2024 8.7 (H)   11/18/2024 8.7 (H)       Had previously been on allopurinol - stopped due to diarrhea.   Rx febuostat - discussed that this may precipitate a gout flare, ultimately a referral to rheumatology made.  Refill pred 10 mg for acute gout flares if needed    #Anemia  HGB   Date Value Ref Range Status   07/22/2025 13.0 12.0 - 16.0 gm/dL Final     Iron Level   Date Value Ref Range Status   07/22/2025 77 30 - 160 ug/dL Final     Transferrin   Date Value Ref Range Status   07/22/2025 192 (L) 200 - 375 mg/dL Final     Iron Binding Capacity Total   Date Value Ref Range Status   07/22/2025 284 250 - 450 ug/dL Final     Saturated Iron   Date Value Ref Range Status   12/10/2024 25 20 - 50 % Final     Ferritin   Date Value Ref Range Status   07/22/2025 249.2 20.0 - 300.0 ng/mL Final     Stable. Continue to monitor    #Secondary hyperparathyroidism  Calcium   Date Value Ref  Range Status   2025 9.6 8.7 - 10.5 mg/dL Final     Phosphorus Level   Date Value Ref Range Status   2025 3.2 2.7 - 4.5 mg/dL Final     PTH Intact   Date Value Ref Range Status   2025 129.2 (H) 9.0 - 77.0 pg/mL Final     Comment:     The testing method is chemiluminescent microparticle immunoassay manufactured by [employer] and performed on the  or [employer] System. Values obtained with different assay manufactures for methods may be different and cannot be used interchangeably.     Stable, continue to monitor      RTC in 3 months      PAST HISTORY:     Past Medical History:   Diagnosis Date    Alpha thalassemia trait     Anxiety     Atherosclerosis of abdominal aorta     noted on CT scan 2016    DDD (degenerative disc disease), lumbar     chronic low back pain    Diverticulosis     History of colonic polyps     last colonoscopy  - normal    Hyperlipidemia LDL goal <100     unable to tolerate statins or Welchol    Hypertension     Osteopenia     no need for medication at this time - last BMD 2010    Overweight     Type II or unspecified type diabetes mellitus without mention of complication, not stated as uncontrolled     Vitamin D deficiency disease     resolved with supplementation       Past Surgical History:   Procedure Laterality Date    CATARACT EXTRACTION Bilateral 19    samuel      bilateral    HEMORRHOID SURGERY  1970    KELOID EXCISION  ,     abdominal wall    TOTAL ABDOMINAL HYSTERECTOMY      TRIGGER FINGER RELEASE      left hand       Family History   Adopted: Yes   Problem Relation Name Age of Onset    Heart failure Mother      Alzheimer's disease Mother      Other Sister Jocelyne          from too much anesthesia    Arthritis Sister Sally         back problems    Hypertension Sister Sally     Transient ischemic attack Sister Sally     Congenital heart disease Brother Mark     Stroke Brother Vieyra     Coronary  artery disease Brother Bonifacio         s/p stenting    Stroke Brother Bonifacio     Breast cancer Maternal Grandmother      Other Son          had eosinophilic granulomatosis -  shortly after lung transplant    Colon cancer Other grandson - Dmitriy     Other Daughter          stiffman syndrome    Diabetes Neg Hx         Social History     Socioeconomic History    Marital status:     Number of children: 2   Occupational History    Occupation:    Tobacco Use    Smoking status: Never    Smokeless tobacco: Never   Substance and Sexual Activity    Alcohol use: No    Drug use: Never     Social Drivers of Health     Financial Resource Strain: Low Risk  (2025)    Overall Financial Resource Strain (CARDIA)     Difficulty of Paying Living Expenses: Not hard at all   Food Insecurity: No Food Insecurity (2025)    Hunger Vital Sign     Worried About Running Out of Food in the Last Year: Never true     Ran Out of Food in the Last Year: Never true   Transportation Needs: No Transportation Needs (2025)    PRAPARE - Transportation     Lack of Transportation (Medical): No     Lack of Transportation (Non-Medical): No   Physical Activity: Sufficiently Active (2025)    Exercise Vital Sign     Days of Exercise per Week: 2 days     Minutes of Exercise per Session: 150+ min   Stress: No Stress Concern Present (2025)    [employer] - Occupational Stress Questionnaire     Feeling of Stress : Only a little   Housing Stability: Low Risk  (2025)    Housing Stability Vital Sign     Unable to Pay for Housing in the Last Year: No     Number of Times Moved in the Last Year: 0     Homeless in the Last Year: No       MEDICATIONS & ALLERGIES:     Current Outpatient Medications on File Prior to Visit   Medication Sig    CALCIUM ORAL Take 1 tablet by mouth once daily.    colestipoL (COLESTID) 1 gram Tab TAKE 2 TABLETS BY MOUTH 2 TIMES DAILY.    cyanocobalamin (VITAMIN  B-12) 1000 MCG tablet Take 100 mcg by mouth once daily.    diclofenac sodium (VOLTAREN ARTHRITIS PAIN) 1 % Gel Apply 2 g topically once daily.    fluticasone (VERAMYST) 27.5 mcg/actuation nasal spray 2 sprays by Nasal route once daily.    fluticasone propionate (FLONASE) 50 mcg/actuation nasal spray     lisinopriL (PRINIVIL,ZESTRIL) 40 MG tablet TAKE 1 TABLET BY MOUTH EVERY DAY    MULTIVITAMIN/IRON/FOLIC ACID (CENTRUM COMPLETE ORAL) Take 1 tablet by mouth once daily.    omega-3 fatty acids 1,000 mg Cap Take by mouth. 1 Capsule Oral Every day    predniSONE (DELTASONE) 10 MG tablet Take 1 tablet (10 mg total) by mouth once daily.    vitamin D 185 MG Tab Take 185 mg by mouth once daily.     No current facility-administered medications on file prior to visit.       Review of patient's allergies indicates:   Allergen Reactions    Cholesterol analogues Other (See Comments)     Muscle spasam    Clindamycin Hives    Statins-hmg-coa reductase inhibitors Other (See Comments)    Sulfa (sulfonamide antibiotics)     Tramadol Nausea And Vomiting    Welchol [colesevelam] Other (See Comments)    Codeine Rash    Penicillins Rash       OBJECTIVE:     Vital Signs:  There were no vitals filed for this visit.    There is no height or weight on file to calculate BMI.     Physical Exam  Constitutional:       General: She is not in acute distress.     Appearance: Normal appearance. She is not ill-appearing.   HENT:      Head: Normocephalic and atraumatic.      Mouth/Throat:      Pharynx: No oropharyngeal exudate or posterior oropharyngeal erythema.   Eyes:      General: No scleral icterus.        Right eye: No discharge.         Left eye: No discharge.      Extraocular Movements: Extraocular movements intact.      Conjunctiva/sclera: Conjunctivae normal.      Pupils: Pupils are equal, round, and reactive to light.   Neck:      Trachea: No tracheal deviation.   Cardiovascular:      Rate and Rhythm: Normal rate and regular rhythm.      Heart  sounds: No murmur heard.  Pulmonary:      Effort: Pulmonary effort is normal. No respiratory distress.      Breath sounds: Normal breath sounds.   Abdominal:      General: Abdomen is flat. Bowel sounds are normal. There is no distension.      Palpations: Abdomen is soft. There is no mass.      Tenderness: There is no abdominal tenderness.   Musculoskeletal:         General: No swelling, tenderness or deformity. Normal range of motion.      Cervical back: Neck supple.      Right lower leg: No edema.      Left lower leg: No edema.   Skin:     General: Skin is warm and dry.      Coloration: Skin is not jaundiced or pale.   Neurological:      General: No focal deficit present.      Mental Status: She is alert and oriented to person, place, and time. Mental status is at baseline.         Laboratory  Sodium   Date Value Ref Range Status   07/22/2025 144 136 - 145 mmol/L Final   01/17/2025 143 136 - 145 mmol/L Final   12/10/2024 143 136 - 145 mmol/L Final     Potassium   Date Value Ref Range Status   07/22/2025 4.5 3.5 - 5.1 mmol/L Final   01/17/2025 3.8 3.5 - 5.1 mmol/L Final   12/10/2024 4.1 3.5 - 5.1 mmol/L Final     Chloride   Date Value Ref Range Status   07/22/2025 106 95 - 110 mmol/L Final   01/17/2025 107 95 - 110 mmol/L Final   12/10/2024 106 95 - 110 mmol/L Final     CO2   Date Value Ref Range Status   07/22/2025 26 23 - 29 mmol/L Final   01/17/2025 26 23 - 29 mmol/L Final   12/10/2024 26 23 - 29 mmol/L Final     BUN   Date Value Ref Range Status   07/22/2025 21 8 - 23 mg/dL Final   01/17/2025 23 8 - 23 mg/dL Final   12/10/2024 22 8 - 23 mg/dL Final     Creatinine   Date Value Ref Range Status   07/22/2025 1.7 (H) 0.5 - 1.4 mg/dL Final   01/17/2025 1.5 (H) 0.5 - 1.4 mg/dL Final   12/10/2024 1.6 (H) 0.5 - 1.4 mg/dL Final     eGFR   Date Value Ref Range Status   07/22/2025 29 (L) >60 mL/min/1.73/m2 Final     Comment:     Estimated GFR calculated using the CKD-EPI creatinine (2021) equation.   01/17/2025 34.2 (A)  >60 mL/min/1.73 m^2 Final   12/10/2024 31.6 (A) >60 mL/min/1.73 m^2 Final     Calcium   Date Value Ref Range Status   07/22/2025 9.6 8.7 - 10.5 mg/dL Final   01/17/2025 9.5 8.7 - 10.5 mg/dL Final   12/10/2024 9.6 8.7 - 10.5 mg/dL Final     Phosphorus Level   Date Value Ref Range Status   07/22/2025 3.2 2.7 - 4.5 mg/dL Final     Phosphorus   Date Value Ref Range Status   01/17/2025 2.8 2.7 - 4.5 mg/dL Final   12/10/2024 2.7 2.7 - 4.5 mg/dL Final     Albumin   Date Value Ref Range Status   07/22/2025 3.8 3.5 - 5.2 g/dL Final   01/17/2025 4.0 3.5 - 5.2 g/dL Final   12/10/2024 4.1 3.5 - 5.2 g/dL Final       Diagnostic Results:      Health Maintenance Due   Topic Date Due    TETANUS VACCINE  Never done    Shingles Vaccine (1 of 2) Never done    RSV Vaccine (Age 60+ and Pregnant patients) (1 - 1-dose 75+ series) Never done    DEXA Scan  01/19/2023    COVID-19 Vaccine (7 - 2024-25 season) 09/01/2024    Diabetes Urine Screening  02/16/2025    Diabetic Eye Exam  10/10/2025         Visit today included increased complexity associated with the care of the episodic problem gout, HTN addressed and managing the longitudinal care of the patient due to the serious and/or complex managed problem(s) CKD.    Hakan Levy MD  Nephrology [facility]

## 2025-07-29 ENCOUNTER — OFFICE VISIT (OUTPATIENT)
Dept: NEPHROLOGY | Facility: CLINIC | Age: 85
End: 2025-07-29
Payer: MEDICARE

## 2025-07-29 VITALS
SYSTOLIC BLOOD PRESSURE: 126 MMHG | RESPIRATION RATE: 18 BRPM | BODY MASS INDEX: 24.24 KG/M2 | WEIGHT: 136.81 LBS | OXYGEN SATURATION: 97 % | HEIGHT: 63 IN | HEART RATE: 61 BPM | DIASTOLIC BLOOD PRESSURE: 74 MMHG

## 2025-07-29 DIAGNOSIS — M10.9 GOUT, UNSPECIFIED CAUSE, UNSPECIFIED CHRONICITY, UNSPECIFIED SITE: ICD-10-CM

## 2025-07-29 DIAGNOSIS — N18.32 STAGE 3B CHRONIC KIDNEY DISEASE: Primary | ICD-10-CM

## 2025-07-29 PROCEDURE — G2211 COMPLEX E/M VISIT ADD ON: HCPCS | Mod: S$PBB,,, | Performed by: STUDENT IN AN ORGANIZED HEALTH CARE EDUCATION/TRAINING PROGRAM

## 2025-07-29 PROCEDURE — 99999 PR PBB SHADOW E&M-EST. PATIENT-LVL IV: CPT | Mod: PBBFAC,,, | Performed by: STUDENT IN AN ORGANIZED HEALTH CARE EDUCATION/TRAINING PROGRAM

## 2025-07-29 PROCEDURE — 99214 OFFICE O/P EST MOD 30 MIN: CPT | Mod: S$PBB,,, | Performed by: STUDENT IN AN ORGANIZED HEALTH CARE EDUCATION/TRAINING PROGRAM

## 2025-07-29 PROCEDURE — 99214 OFFICE O/P EST MOD 30 MIN: CPT | Mod: PBBFAC | Performed by: STUDENT IN AN ORGANIZED HEALTH CARE EDUCATION/TRAINING PROGRAM

## 2025-07-29 RX ORDER — PREDNISONE 10 MG/1
10 TABLET ORAL DAILY
Qty: 5 TABLET | Refills: 3 | Status: SHIPPED | OUTPATIENT
Start: 2025-07-29

## 2025-07-29 RX ORDER — LOSARTAN POTASSIUM 50 MG/1
50 TABLET ORAL DAILY
Qty: 90 TABLET | Refills: 3 | Status: SHIPPED | OUTPATIENT
Start: 2025-07-29 | End: 2026-07-29

## 2025-07-29 RX ORDER — FEBUXOSTAT 40 MG/1
40 TABLET, FILM COATED ORAL DAILY
Qty: 30 TABLET | Refills: 3 | Status: SHIPPED | OUTPATIENT
Start: 2025-07-29

## 2025-07-29 NOTE — PATIENT INSTRUCTIONS
Please change the lisinopril to losartan, it should help with the gout flares. However please keep an eye on your blood pressure, it is difficult to convert perfectly from one to another. We will get a repeat lab in 1 week    I have put in a referral to the rheumatologist. Please  the febuxostat and the prednisone. You have hold off on starting the febuoxstat for now, and if you have another gout flare you can take the prednisone    Otherwise, I will see you back in 3 months

## 2025-08-03 ENCOUNTER — PATIENT MESSAGE (OUTPATIENT)
Dept: NEPHROLOGY | Facility: HOSPITAL | Age: 85
End: 2025-08-03
Payer: MEDICARE

## 2025-08-03 DIAGNOSIS — N28.1 RENAL CYST: Primary | ICD-10-CM

## 2025-08-03 RX ORDER — ALLOPURINOL 100 MG/1
100 TABLET ORAL DAILY
Qty: 90 TABLET | Refills: 3 | Status: SHIPPED | OUTPATIENT
Start: 2025-08-03

## 2025-08-04 ENCOUNTER — TELEPHONE (OUTPATIENT)
Dept: NEPHROLOGY | Facility: CLINIC | Age: 85
End: 2025-08-04
Payer: MEDICARE

## 2025-08-04 ENCOUNTER — TELEPHONE (OUTPATIENT)
Dept: FAMILY MEDICINE | Facility: CLINIC | Age: 85
End: 2025-08-04
Payer: MEDICARE

## 2025-08-04 NOTE — TELEPHONE ENCOUNTER
Attempted to contact patient. Left a voice mail to call clinic back. Paperwork faxed over on 07/24/2025.

## 2025-08-05 ENCOUNTER — OFFICE VISIT (OUTPATIENT)
Dept: UROLOGY | Facility: CLINIC | Age: 85
End: 2025-08-05
Payer: MEDICARE

## 2025-08-05 ENCOUNTER — LAB VISIT (OUTPATIENT)
Dept: LAB | Facility: HOSPITAL | Age: 85
End: 2025-08-05
Attending: STUDENT IN AN ORGANIZED HEALTH CARE EDUCATION/TRAINING PROGRAM
Payer: MEDICARE

## 2025-08-05 VITALS — HEART RATE: 58 BPM | DIASTOLIC BLOOD PRESSURE: 77 MMHG | SYSTOLIC BLOOD PRESSURE: 194 MMHG

## 2025-08-05 DIAGNOSIS — N28.1 RENAL CYST: ICD-10-CM

## 2025-08-05 DIAGNOSIS — N28.1 CYST OF KIDNEY, ACQUIRED: Primary | ICD-10-CM

## 2025-08-05 DIAGNOSIS — N18.32 STAGE 3B CHRONIC KIDNEY DISEASE: ICD-10-CM

## 2025-08-05 LAB
ALBUMIN SERPL BCP-MCNC: 4.1 G/DL (ref 3.5–5.2)
ANION GAP (OHS): 10 MMOL/L (ref 8–16)
BUN SERPL-MCNC: 18 MG/DL (ref 8–23)
CALCIUM SERPL-MCNC: 9.2 MG/DL (ref 8.7–10.5)
CHLORIDE SERPL-SCNC: 106 MMOL/L (ref 95–110)
CO2 SERPL-SCNC: 27 MMOL/L (ref 23–29)
CREAT SERPL-MCNC: 1.5 MG/DL (ref 0.5–1.4)
GFR SERPLBLD CREATININE-BSD FMLA CKD-EPI: 34 ML/MIN/1.73/M2
GLUCOSE SERPL-MCNC: 57 MG/DL (ref 70–110)
PHOSPHATE SERPL-MCNC: 3.3 MG/DL (ref 2.7–4.5)
POTASSIUM SERPL-SCNC: 4.3 MMOL/L (ref 3.5–5.1)
SODIUM SERPL-SCNC: 143 MMOL/L (ref 136–145)

## 2025-08-05 PROCEDURE — 36415 COLL VENOUS BLD VENIPUNCTURE: CPT | Mod: PO

## 2025-08-05 PROCEDURE — 82435 ASSAY OF BLOOD CHLORIDE: CPT

## 2025-08-05 PROCEDURE — 84520 ASSAY OF UREA NITROGEN: CPT

## 2025-08-05 PROCEDURE — 99999 PR PBB SHADOW E&M-EST. PATIENT-LVL III: CPT | Mod: PBBFAC,,, | Performed by: UROLOGY

## 2025-08-05 PROCEDURE — 99213 OFFICE O/P EST LOW 20 MIN: CPT | Mod: PBBFAC | Performed by: UROLOGY

## 2025-08-05 NOTE — PROGRESS NOTES
Subjective:       Patient ID: Lizbeth Hutchison is a 85 y.o. female who was referred by Hakan Levy MD    Chief Complaint:   No chief complaint on file.      Complex renal cyst  She sees Dr. Levy for CKD.  He has been monitoring her kidneys with ultrasounds.  She is here for evaluation of a complex cyst.  She denies hematuria or flank pain.  She may have had some hematuria in 2024.    ACTIVE MEDICAL ISSUES:  Problem List[1]    PAST MEDICAL HISTORY  Past Medical History:   Diagnosis Date    Alpha thalassemia trait     Anxiety     Atherosclerosis of abdominal aorta     noted on CT scan 2016    DDD (degenerative disc disease), lumbar     chronic low back pain    Diverticulosis     History of colonic polyps     last colonoscopy  - normal    Hyperlipidemia LDL goal <100     unable to tolerate statins or Welchol    Hypertension     Osteopenia     no need for medication at this time - last BMD 2010    Overweight     Type II or unspecified type diabetes mellitus without mention of complication, not stated as uncontrolled     Vitamin D deficiency disease     resolved with supplementation       PAST SURGICAL HISTORY:  Past Surgical History:   Procedure Laterality Date    CATARACT EXTRACTION Bilateral 19    samuel      bilateral    HEMORRHOID SURGERY      KELOID EXCISION  ,     abdominal wall    TOTAL ABDOMINAL HYSTERECTOMY      TRIGGER FINGER RELEASE      left hand       SOCIAL HISTORY:  Social History[2]    FAMILY HISTORY:  Family History   Adopted: Yes   Problem Relation Name Age of Onset    Heart failure Mother      Alzheimer's disease Mother      Other Sister Jocelyne          from too much anesthesia    Arthritis Sister Sally         back problems    Hypertension Sister Sally     Transient ischemic attack Sister Sally     Congenital heart disease Brother Mark     Stroke Brother Vieyra     Coronary artery disease Brother Bonifacio         s/p stenting     Stroke Brother Bonifacio     Breast cancer Maternal Grandmother      Other Son          had eosinophilic granulomatosis -  shortly after lung transplant    Colon cancer Other grandson - Dmitriy     Other Daughter          stiffman syndrome    Diabetes Neg Hx         ALLERGIES AND MEDICATIONS: updated and reviewed.  Review of patient's allergies indicates:   Allergen Reactions    Cholesterol analogues Other (See Comments)     Muscle spasam    Clindamycin Hives    Statins-hmg-coa reductase inhibitors Other (See Comments)    Sulfa (sulfonamide antibiotics)     Tramadol Nausea And Vomiting    Welchol [colesevelam] Other (See Comments)    Codeine Rash    Penicillins Rash     Current Outpatient Medications   Medication Sig    allopurinoL (ZYLOPRIM) 100 MG tablet Take 1 tablet (100 mg total) by mouth once daily.    colestipoL (COLESTID) 1 gram Tab TAKE 2 TABLETS BY MOUTH 2 TIMES DAILY.    diclofenac sodium (VOLTAREN ARTHRITIS PAIN) 1 % Gel Apply 2 g topically once daily.    fluticasone propionate (FLONASE) 50 mcg/actuation nasal spray     losartan (COZAAR) 50 MG tablet Take 1 tablet (50 mg total) by mouth once daily.    predniSONE (DELTASONE) 10 MG tablet Take 1 tablet (10 mg total) by mouth once daily.    vitamin D 185 MG Tab Take 185 mg by mouth once daily.    CALCIUM ORAL Take 1 tablet by mouth once daily. (Patient not taking: Reported on 2025)    cyanocobalamin (VITAMIN B-12) 1000 MCG tablet Take 100 mcg by mouth once daily. (Patient not taking: Reported on 2025)    fluticasone (VERAMYST) 27.5 mcg/actuation nasal spray 2 sprays by Nasal route once daily. (Patient not taking: Reported on 2025)    MULTIVITAMIN/IRON/FOLIC ACID (CENTRUM COMPLETE ORAL) Take 1 tablet by mouth once daily. (Patient not taking: Reported on 2025)    omega-3 fatty acids 1,000 mg Cap Take by mouth. 1 Capsule Oral Every day (Patient not taking: Reported on 2025)     No current facility-administered medications for this visit.        Review of Systems   Constitutional:  Negative for chills, fatigue and fever.   Respiratory:  Negative for chest tightness and shortness of breath.    Cardiovascular:  Negative for chest pain.   Gastrointestinal:  Negative for abdominal distention, constipation, nausea and vomiting.   Genitourinary:  Negative for difficulty urinating, dysuria, flank pain, frequency, hematuria and urgency.   Musculoskeletal:  Negative for arthralgias.   Neurological:  Negative for light-headedness.   Psychiatric/Behavioral:  Negative for confusion.        Objective:      There were no vitals filed for this visit.  Physical Exam  Vitals and nursing note reviewed.   Constitutional:       Appearance: She is well-developed.   HENT:      Head: Normocephalic.   Eyes:      Conjunctiva/sclera: Conjunctivae normal.   Neck:      Thyroid: No thyromegaly.      Trachea: No tracheal deviation.   Cardiovascular:      Rate and Rhythm: Normal rate.      Pulses: Normal pulses.      Heart sounds: Normal heart sounds.   Pulmonary:      Effort: Pulmonary effort is normal. No respiratory distress.      Breath sounds: Normal breath sounds. No wheezing.   Abdominal:      General: There is no distension.      Palpations: Abdomen is soft. There is no mass.      Tenderness: There is no abdominal tenderness. There is no guarding or rebound.      Hernia: No hernia is present.   Musculoskeletal:         General: No tenderness. Normal range of motion.      Cervical back: Normal range of motion.   Lymphadenopathy:      Cervical: No cervical adenopathy.   Skin:     General: Skin is warm and dry.      Findings: No erythema or rash.   Neurological:      Mental Status: She is alert and oriented to person, place, and time.   Psychiatric:         Behavior: Behavior normal.         Thought Content: Thought content normal.         Judgment: Judgment normal.         Urine dipstick shows not done.  Micro exam: not done.    BMP  Lab Results   Component Value Date    NA  144 07/22/2025    K 4.5 07/22/2025     07/22/2025    CO2 26 07/22/2025    BUN 21 07/22/2025    CREATININE 1.7 (H) 07/22/2025    CALCIUM 9.6 07/22/2025    ANIONGAP 12 07/22/2025    EGFRNORACEVR 29 (L) 07/22/2025        US Retroperitoneal Complete  Order: [id number]   Status: Final result       Next appt: Today at 09:30 AM in Lab ([facility], [facility])       Dx: Stage 3b chronic kidney disease    Test Result Released: Yes (not seen)    0 Result Notes       2 Follow-up Encounters  Details    Reading Physician Reading Date Result Priority   Deshawn Leary MD  [phone]  7/22/2025 Routine     Narrative & Impression  EXAMINATION:  US RETROPERITONEAL COMPLETE     CLINICAL HISTORY:  Chronic kidney disease, stage 3b     TECHNIQUE:  Ultrasound of the kidneys and urinary bladder was performed including color flow and Doppler evaluation of the kidneys.     COMPARISON:  Ultrasound retroperitoneal complete 01/02/2025.     FINDINGS:  Right kidney: The right kidney measures 9.2 cm. No cortical thinning. No loss of corticomedullary distinction. Resistive index measures 0.83.  No mass. No renal stone. No hydronephrosis.  Right renal calcification measuring 1.7 cm.     Left kidney: The left kidney measures 9.3 cm. No cortical thinning. No loss of corticomedullary distinction. Resistive index measures 0.81.  No mass. No renal stone. No hydronephrosis.  There are 2 mildly complex renal cysts measuring 3.0 x 1.9 x 2.3 cm and 3.0 x 3.3 x 2.8 cm with thin internal septation.     The bladder is partially distended at the time of scanning and has an unremarkable appearance.     Impression:     Left-sided simple and mildly complex renal cysts which are overall similar from comparison ultrasound 01/02/2025.     Elevated renal resistive indices, overall nonspecific, but can be seen in the setting of medical renal disease.        Electronically signed by:Deshawn Leary  Date:                                            07/22/2025  Time:                                            09:14        Exam Ended: 07/22/25 09:07 CDT       Assessment:       1. Cyst of kidney, acquired    2. Renal cyst          Plan:       1. Renal cyst    - Ambulatory referral/consult to Urology    2. Cyst of kidney, acquired    - MRI Abdomen W WO Contrast; Future  - Basic Metabolic Panel; Future            Follow up in about 4 weeks (around 9/2/2025) for Review X-ray.         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

## 2025-08-06 ENCOUNTER — TELEPHONE (OUTPATIENT)
Dept: FAMILY MEDICINE | Facility: CLINIC | Age: 85
End: 2025-08-06
Payer: MEDICARE

## 2025-08-06 ENCOUNTER — TELEPHONE (OUTPATIENT)
Dept: NEPHROLOGY | Facility: CLINIC | Age: 85
End: 2025-08-06
Payer: MEDICARE

## 2025-08-06 NOTE — TELEPHONE ENCOUNTER
Spoke with patient in regards to letter that was dropped off. [employer] form was faxed over on 07/23/2025. Paperwork re faxed over on 08/05/2025. Patient was advised that she can  paperwork behind second floor registration.     Patient also states that Dr. Levy switched from lisinopril to losartan 50mg. Patient states that her blood pressure was 214/76. Patient states that she was instructed by Dr. Levy's office to take the losartan and then return the call to their office at 3:00 with an updated blood pressure reading. Patient was advised to call his office in regards to blood pressure. Patient verbalized understanding and just wanted Dr. Cruz to know.

## 2025-08-06 NOTE — TELEPHONE ENCOUNTER
Patient contacted the office in reference of an updated blood pressure reading: Earlier today 219/97 and after taking lisinopril a few hours later, she reports her blood pressure 184/86. Patient reports no symptoms; however, attended a  today. Encouraged to contact the office with updates after medication changes.

## 2025-08-06 NOTE — TELEPHONE ENCOUNTER
This patient contacted the office in reference of her blood pressure at urology on yesterday. She states that Dr. Levy recently changed her lisinopril to losartan. She will  a machine today to monitor at home; however, she would like to know what to do in the event that her blood pressure remains high.     Please advise

## 2025-08-08 ENCOUNTER — HOSPITAL ENCOUNTER (EMERGENCY)
Facility: HOSPITAL | Age: 85
Discharge: HOME | End: 2025-08-08
Payer: MEDICARE

## 2025-08-08 ENCOUNTER — TELEPHONE (OUTPATIENT)
Dept: NEPHROLOGY | Facility: CLINIC | Age: 85
End: 2025-08-08
Payer: MEDICARE

## 2025-08-08 VITALS
WEIGHT: 136 LBS | DIASTOLIC BLOOD PRESSURE: 84 MMHG | BODY MASS INDEX: 24.09 KG/M2 | SYSTOLIC BLOOD PRESSURE: 185 MMHG | OXYGEN SATURATION: 97 % | HEART RATE: 56 BPM | TEMPERATURE: 98 F | RESPIRATION RATE: 18 BRPM

## 2025-08-08 DIAGNOSIS — I10 HYPERTENSION, UNSPECIFIED TYPE: Primary | ICD-10-CM

## 2025-08-08 PROCEDURE — 99283 EMERGENCY DEPT VISIT LOW MDM: CPT

## 2025-08-08 RX ORDER — LOSARTAN POTASSIUM 100 MG/1
100 TABLET ORAL DAILY
Qty: 90 TABLET | Refills: 3 | Status: SHIPPED | OUTPATIENT
Start: 2025-08-08 | End: 2026-08-08

## 2025-08-08 NOTE — TELEPHONE ENCOUNTER
This patient was contacted in reference of status update for blood pressure. As of now, patient is not symptomatic and reports no headaches, dizziness, or blurred vision. She was recently informed to stop taking losartan and begin back on lisinopril. Patient's blood pressure has increased after last reading of 184/86. She now reports 194/97. Instructed to go and be evaluated by a physician in the emergency room for treatment. She has been informed previously that provider is out of the office until 8/20, but the on-call physician would also feel comfortable if she is seen.

## 2025-08-16 ENCOUNTER — HOSPITAL ENCOUNTER (EMERGENCY)
Facility: HOSPITAL | Age: 85
Discharge: HOME | End: 2025-08-16
Attending: EMERGENCY MEDICINE
Payer: MEDICARE

## 2025-08-16 VITALS
TEMPERATURE: 98 F | DIASTOLIC BLOOD PRESSURE: 80 MMHG | OXYGEN SATURATION: 98 % | WEIGHT: 136 LBS | HEART RATE: 60 BPM | RESPIRATION RATE: 16 BRPM | BODY MASS INDEX: 24.1 KG/M2 | SYSTOLIC BLOOD PRESSURE: 170 MMHG | HEIGHT: 63 IN

## 2025-08-16 DIAGNOSIS — I10 HYPERTENSION, UNSPECIFIED TYPE: Primary | ICD-10-CM

## 2025-08-16 LAB
ALLENS TEST: ABNORMAL
ANION GAP SERPL CALC-SCNC: ABNORMAL MMOL/L
BUN SERPL-MCNC: 20 MG/DL (ref 6–30)
CHLORIDE SERPL-SCNC: 107 MMOL/L (ref 95–110)
CREAT SERPL-MCNC: 1.7 MG/DL (ref 0.5–1.4)
GLUCOSE SERPL-MCNC: 78 MG/DL (ref 70–110)
HCT VFR BLD CALC: 44 %PCV (ref 36–54)
POC IONIZED CALCIUM: 1.1 MMOL/L (ref 1.06–1.42)
POC TCO2 (MEASURED): ABNORMAL MMOL/L
POTASSIUM BLD-SCNC: 3.9 MMOL/L (ref 3.5–5.1)
SAMPLE: ABNORMAL
SITE: ABNORMAL
SODIUM BLD-SCNC: 143 MMOL/L (ref 136–145)

## 2025-08-16 PROCEDURE — 82962 GLUCOSE BLOOD TEST: CPT

## 2025-08-16 PROCEDURE — 84132 ASSAY OF SERUM POTASSIUM: CPT

## 2025-08-16 PROCEDURE — 99900035 HC TECH TIME PER 15 MIN (STAT)

## 2025-08-16 PROCEDURE — 25000003 PHARM REV CODE 250: Performed by: EMERGENCY MEDICINE

## 2025-08-16 PROCEDURE — 85014 HEMATOCRIT: CPT

## 2025-08-16 PROCEDURE — 84295 ASSAY OF SERUM SODIUM: CPT

## 2025-08-16 PROCEDURE — 82330 ASSAY OF CALCIUM: CPT

## 2025-08-16 PROCEDURE — 82565 ASSAY OF CREATININE: CPT

## 2025-08-16 PROCEDURE — 99283 EMERGENCY DEPT VISIT LOW MDM: CPT

## 2025-08-16 RX ORDER — LOSARTAN POTASSIUM 25 MG/1
100 TABLET ORAL
Status: DISCONTINUED | OUTPATIENT
Start: 2025-08-16 | End: 2025-08-16

## 2025-08-16 RX ORDER — LOSARTAN POTASSIUM 25 MG/1
50 TABLET ORAL
Status: DISCONTINUED | OUTPATIENT
Start: 2025-08-16 | End: 2025-08-16

## 2025-08-16 RX ORDER — AMLODIPINE BESYLATE 5 MG/1
10 TABLET ORAL
Status: COMPLETED | OUTPATIENT
Start: 2025-08-16 | End: 2025-08-16

## 2025-08-16 RX ORDER — AMLODIPINE BESYLATE 10 MG/1
10 TABLET ORAL DAILY
Qty: 30 TABLET | Refills: 0 | Status: SHIPPED | OUTPATIENT
Start: 2025-08-16 | End: 2025-09-15

## 2025-08-16 RX ADMIN — AMLODIPINE BESYLATE 10 MG: 5 TABLET ORAL at 02:08

## 2025-08-19 ENCOUNTER — OFFICE VISIT (OUTPATIENT)
Dept: FAMILY MEDICINE | Facility: CLINIC | Age: 85
End: 2025-08-19
Payer: MEDICARE

## 2025-08-19 VITALS
WEIGHT: 136.56 LBS | SYSTOLIC BLOOD PRESSURE: 136 MMHG | HEART RATE: 60 BPM | HEIGHT: 63 IN | BODY MASS INDEX: 24.2 KG/M2 | TEMPERATURE: 98 F | OXYGEN SATURATION: 97 % | DIASTOLIC BLOOD PRESSURE: 60 MMHG

## 2025-08-19 DIAGNOSIS — N18.4 BENIGN HYPERTENSION WITH CHRONIC KIDNEY DISEASE, STAGE IV: Primary | ICD-10-CM

## 2025-08-19 DIAGNOSIS — M10.9 GOUT, UNSPECIFIED CAUSE, UNSPECIFIED CHRONICITY, UNSPECIFIED SITE: ICD-10-CM

## 2025-08-19 DIAGNOSIS — I12.9 BENIGN HYPERTENSION WITH CHRONIC KIDNEY DISEASE, STAGE IV: Primary | ICD-10-CM

## 2025-08-19 DIAGNOSIS — R00.1 BRADYCARDIA BY ELECTROCARDIOGRAM: ICD-10-CM

## 2025-08-19 PROCEDURE — 99214 OFFICE O/P EST MOD 30 MIN: CPT | Mod: S$PBB,,,

## 2025-08-19 PROCEDURE — 99999 PR PBB SHADOW E&M-EST. PATIENT-LVL IV: CPT | Mod: PBBFAC,,,

## 2025-08-19 PROCEDURE — G2211 COMPLEX E/M VISIT ADD ON: HCPCS | Mod: ,,,

## 2025-08-19 PROCEDURE — 99214 OFFICE O/P EST MOD 30 MIN: CPT | Mod: PBBFAC,PO

## 2025-08-25 ENCOUNTER — TELEPHONE (OUTPATIENT)
Dept: FAMILY MEDICINE | Facility: CLINIC | Age: 85
End: 2025-08-25
Payer: MEDICARE

## 2025-08-26 ENCOUNTER — OFFICE VISIT (OUTPATIENT)
Dept: FAMILY MEDICINE | Facility: CLINIC | Age: 85
End: 2025-08-26
Payer: MEDICARE

## 2025-08-26 ENCOUNTER — HOSPITAL ENCOUNTER (OUTPATIENT)
Dept: RADIOLOGY | Facility: HOSPITAL | Age: 85
Discharge: HOME | End: 2025-08-26
Attending: UROLOGY
Payer: MEDICARE

## 2025-08-26 VITALS
WEIGHT: 135.13 LBS | SYSTOLIC BLOOD PRESSURE: 152 MMHG | TEMPERATURE: 98 F | BODY MASS INDEX: 23.94 KG/M2 | HEIGHT: 63 IN | DIASTOLIC BLOOD PRESSURE: 70 MMHG | HEART RATE: 55 BPM | OXYGEN SATURATION: 99 %

## 2025-08-26 DIAGNOSIS — H61.23 BILATERAL IMPACTED CERUMEN: ICD-10-CM

## 2025-08-26 DIAGNOSIS — N28.1 CYST OF KIDNEY, ACQUIRED: ICD-10-CM

## 2025-08-26 DIAGNOSIS — H92.03 OTALGIA, BILATERAL: ICD-10-CM

## 2025-08-26 DIAGNOSIS — R00.1 BRADYCARDIA: ICD-10-CM

## 2025-08-26 DIAGNOSIS — N18.4 BENIGN HYPERTENSION WITH CHRONIC KIDNEY DISEASE, STAGE IV: Primary | ICD-10-CM

## 2025-08-26 DIAGNOSIS — I12.9 BENIGN HYPERTENSION WITH CHRONIC KIDNEY DISEASE, STAGE IV: Primary | ICD-10-CM

## 2025-08-26 PROCEDURE — 99214 OFFICE O/P EST MOD 30 MIN: CPT | Mod: PBBFAC,PO

## 2025-08-26 PROCEDURE — 99999 PR PBB SHADOW E&M-EST. PATIENT-LVL IV: CPT | Mod: PBBFAC,,,

## 2025-08-26 PROCEDURE — 69209 REMOVE IMPACTED EAR WAX UNI: CPT | Mod: 50,S$PBB,,

## 2025-08-26 PROCEDURE — 25500020 PHARM REV CODE 255: Performed by: UROLOGY

## 2025-08-26 PROCEDURE — 74183 MRI ABD W/O CNTR FLWD CNTR: CPT | Mod: TC

## 2025-08-26 PROCEDURE — 99214 OFFICE O/P EST MOD 30 MIN: CPT | Mod: S$PBB,25,,

## 2025-08-26 PROCEDURE — 69209 REMOVE IMPACTED EAR WAX UNI: CPT | Mod: 50,PBBFAC,PO

## 2025-08-26 PROCEDURE — A9585 GADOBUTROL INJECTION: HCPCS | Performed by: UROLOGY

## 2025-08-26 PROCEDURE — 74183 MRI ABD W/O CNTR FLWD CNTR: CPT | Mod: 26,,, | Performed by: STUDENT IN AN ORGANIZED HEALTH CARE EDUCATION/TRAINING PROGRAM

## 2025-08-26 RX ORDER — GADOBUTROL 604.72 MG/ML
6 INJECTION INTRAVENOUS
Status: COMPLETED | OUTPATIENT
Start: 2025-08-26 | End: 2025-08-26

## 2025-08-26 RX ADMIN — GADOBUTROL 6 ML: 604.72 INJECTION INTRAVENOUS at 03:08

## 2025-09-01 LAB
OHS QRS DURATION: 80 MS
OHS QTC CALCULATION: 418 MS

## 2025-09-04 ENCOUNTER — OFFICE VISIT (OUTPATIENT)
Dept: UROLOGY | Facility: CLINIC | Age: 85
End: 2025-09-04
Payer: MEDICARE

## 2025-09-04 VITALS — DIASTOLIC BLOOD PRESSURE: 69 MMHG | SYSTOLIC BLOOD PRESSURE: 117 MMHG

## 2025-09-04 DIAGNOSIS — N28.1 RENAL CYST: ICD-10-CM

## 2025-09-04 DIAGNOSIS — N28.1 CYST OF KIDNEY, ACQUIRED: Primary | ICD-10-CM

## 2025-09-04 PROCEDURE — 99999 PR PBB SHADOW E&M-EST. PATIENT-LVL III: CPT | Mod: PBBFAC,,, | Performed by: UROLOGY

## 2025-09-04 PROCEDURE — 99213 OFFICE O/P EST LOW 20 MIN: CPT | Mod: PBBFAC | Performed by: UROLOGY
